# Patient Record
Sex: FEMALE | Race: OTHER | HISPANIC OR LATINO | URBAN - METROPOLITAN AREA
[De-identification: names, ages, dates, MRNs, and addresses within clinical notes are randomized per-mention and may not be internally consistent; named-entity substitution may affect disease eponyms.]

---

## 2018-02-03 ENCOUNTER — INPATIENT (INPATIENT)
Facility: HOSPITAL | Age: 83
LOS: 3 days | Discharge: HOME HEALTH | DRG: 293 | End: 2018-02-07
Attending: INTERNAL MEDICINE | Admitting: INTERNAL MEDICINE
Payer: MEDICARE

## 2018-02-03 VITALS
RESPIRATION RATE: 18 BRPM | HEART RATE: 88 BPM | DIASTOLIC BLOOD PRESSURE: 94 MMHG | SYSTOLIC BLOOD PRESSURE: 167 MMHG | OXYGEN SATURATION: 94 % | TEMPERATURE: 98 F | HEIGHT: 64 IN | WEIGHT: 182.1 LBS

## 2018-02-03 LAB
ALBUMIN SERPL ELPH-MCNC: 4.1 G/DL — SIGNIFICANT CHANGE UP (ref 3.3–5)
ALP SERPL-CCNC: 104 U/L — SIGNIFICANT CHANGE UP (ref 40–120)
ALT FLD-CCNC: 20 U/L — SIGNIFICANT CHANGE UP (ref 10–45)
ANION GAP SERPL CALC-SCNC: 12 MMOL/L — SIGNIFICANT CHANGE UP (ref 5–17)
ANION GAP SERPL CALC-SCNC: 13 MMOL/L — SIGNIFICANT CHANGE UP (ref 5–17)
APTT BLD: 36.2 SEC — SIGNIFICANT CHANGE UP (ref 27.5–37.4)
AST SERPL-CCNC: 36 U/L — SIGNIFICANT CHANGE UP (ref 10–40)
BASOPHILS NFR BLD AUTO: 0.6 % — SIGNIFICANT CHANGE UP (ref 0–2)
BILIRUB SERPL-MCNC: 0.5 MG/DL — SIGNIFICANT CHANGE UP (ref 0.2–1.2)
BLD GP AB SCN SERPL QL: NEGATIVE — SIGNIFICANT CHANGE UP
BUN SERPL-MCNC: 17 MG/DL — SIGNIFICANT CHANGE UP (ref 7–23)
BUN SERPL-MCNC: 18 MG/DL — SIGNIFICANT CHANGE UP (ref 7–23)
CALCIUM SERPL-MCNC: 9.5 MG/DL — SIGNIFICANT CHANGE UP (ref 8.4–10.5)
CALCIUM SERPL-MCNC: 9.6 MG/DL — SIGNIFICANT CHANGE UP (ref 8.4–10.5)
CHLORIDE SERPL-SCNC: 97 MMOL/L — SIGNIFICANT CHANGE UP (ref 96–108)
CHLORIDE SERPL-SCNC: 97 MMOL/L — SIGNIFICANT CHANGE UP (ref 96–108)
CHOLEST SERPL-MCNC: 134 MG/DL — SIGNIFICANT CHANGE UP (ref 10–199)
CK MB CFR SERPL CALC: 4.3 NG/ML — SIGNIFICANT CHANGE UP (ref 0–6.7)
CO2 SERPL-SCNC: 28 MMOL/L — SIGNIFICANT CHANGE UP (ref 22–31)
CO2 SERPL-SCNC: 29 MMOL/L — SIGNIFICANT CHANGE UP (ref 22–31)
CREAT SERPL-MCNC: 0.96 MG/DL — SIGNIFICANT CHANGE UP (ref 0.5–1.3)
CREAT SERPL-MCNC: 1.05 MG/DL — SIGNIFICANT CHANGE UP (ref 0.5–1.3)
EOSINOPHIL NFR BLD AUTO: 0.9 % — SIGNIFICANT CHANGE UP (ref 0–6)
GLUCOSE SERPL-MCNC: 120 MG/DL — HIGH (ref 70–99)
GLUCOSE SERPL-MCNC: 147 MG/DL — HIGH (ref 70–99)
HBA1C BLD-MCNC: 6.1 % — HIGH (ref 4–5.6)
HCT VFR BLD CALC: 41.1 % — SIGNIFICANT CHANGE UP (ref 34.5–45)
HDLC SERPL-MCNC: 42 MG/DL — SIGNIFICANT CHANGE UP (ref 40–125)
HGB BLD-MCNC: 13.4 G/DL — SIGNIFICANT CHANGE UP (ref 11.5–15.5)
INR BLD: 1.47 — HIGH (ref 0.88–1.16)
LIPID PNL WITH DIRECT LDL SERPL: 73 MG/DL — SIGNIFICANT CHANGE UP
LYMPHOCYTES # BLD AUTO: 28.7 % — SIGNIFICANT CHANGE UP (ref 13–44)
MAGNESIUM SERPL-MCNC: 2 MG/DL — SIGNIFICANT CHANGE UP (ref 1.6–2.6)
MCHC RBC-ENTMCNC: 31.4 PG — SIGNIFICANT CHANGE UP (ref 27–34)
MCHC RBC-ENTMCNC: 32.6 G/DL — SIGNIFICANT CHANGE UP (ref 32–36)
MCV RBC AUTO: 96.3 FL — SIGNIFICANT CHANGE UP (ref 80–100)
MONOCYTES NFR BLD AUTO: 13.6 % — SIGNIFICANT CHANGE UP (ref 2–14)
NEUTROPHILS NFR BLD AUTO: 56.2 % — SIGNIFICANT CHANGE UP (ref 43–77)
NT-PROBNP SERPL-SCNC: 1982 PG/ML — HIGH (ref 0–300)
PLATELET # BLD AUTO: 162 K/UL — SIGNIFICANT CHANGE UP (ref 150–400)
POTASSIUM SERPL-MCNC: 3.9 MMOL/L — SIGNIFICANT CHANGE UP (ref 3.5–5.3)
POTASSIUM SERPL-MCNC: 4.2 MMOL/L — SIGNIFICANT CHANGE UP (ref 3.5–5.3)
POTASSIUM SERPL-SCNC: 3.9 MMOL/L — SIGNIFICANT CHANGE UP (ref 3.5–5.3)
POTASSIUM SERPL-SCNC: 4.2 MMOL/L — SIGNIFICANT CHANGE UP (ref 3.5–5.3)
PROT SERPL-MCNC: 8.3 G/DL — SIGNIFICANT CHANGE UP (ref 6–8.3)
PROTHROM AB SERPL-ACNC: 16.5 SEC — HIGH (ref 9.8–12.7)
RBC # BLD: 4.27 M/UL — SIGNIFICANT CHANGE UP (ref 3.8–5.2)
RBC # FLD: 13.4 % — SIGNIFICANT CHANGE UP (ref 10.3–16.9)
RH IG SCN BLD-IMP: NEGATIVE — SIGNIFICANT CHANGE UP
SODIUM SERPL-SCNC: 137 MMOL/L — SIGNIFICANT CHANGE UP (ref 135–145)
SODIUM SERPL-SCNC: 139 MMOL/L — SIGNIFICANT CHANGE UP (ref 135–145)
TOTAL CHOLESTEROL/HDL RATIO MEASUREMENT: 3.2 RATIO — LOW (ref 3.3–7.1)
TRIGL SERPL-MCNC: 95 MG/DL — SIGNIFICANT CHANGE UP (ref 10–149)
TROPONIN T SERPL-MCNC: <0.01 NG/ML — SIGNIFICANT CHANGE UP (ref 0–0.01)
TROPONIN T SERPL-MCNC: <0.01 NG/ML — SIGNIFICANT CHANGE UP (ref 0–0.01)
TSH SERPL-MCNC: 1.18 UIU/ML — SIGNIFICANT CHANGE UP (ref 0.35–4.94)
WBC # BLD: 6.5 K/UL — SIGNIFICANT CHANGE UP (ref 3.8–10.5)
WBC # FLD AUTO: 6.5 K/UL — SIGNIFICANT CHANGE UP (ref 3.8–10.5)

## 2018-02-03 PROCEDURE — 99285 EMERGENCY DEPT VISIT HI MDM: CPT | Mod: 25

## 2018-02-03 PROCEDURE — 99223 1ST HOSP IP/OBS HIGH 75: CPT | Mod: AI

## 2018-02-03 PROCEDURE — 71045 X-RAY EXAM CHEST 1 VIEW: CPT | Mod: 26

## 2018-02-03 PROCEDURE — 93010 ELECTROCARDIOGRAM REPORT: CPT

## 2018-02-03 PROCEDURE — 70450 CT HEAD/BRAIN W/O DYE: CPT | Mod: 26

## 2018-02-03 RX ORDER — METOPROLOL TARTRATE 50 MG
12.5 TABLET ORAL ONCE
Qty: 0 | Refills: 0 | Status: COMPLETED | OUTPATIENT
Start: 2018-02-03 | End: 2018-02-03

## 2018-02-03 RX ORDER — VALSARTAN 80 MG/1
160 TABLET ORAL DAILY
Qty: 0 | Refills: 0 | Status: DISCONTINUED | OUTPATIENT
Start: 2018-02-04 | End: 2018-02-07

## 2018-02-03 RX ORDER — FUROSEMIDE 40 MG
20 TABLET ORAL
Qty: 0 | Refills: 0 | Status: DISCONTINUED | OUTPATIENT
Start: 2018-02-04 | End: 2018-02-07

## 2018-02-03 RX ORDER — FUROSEMIDE 40 MG
20 TABLET ORAL ONCE
Qty: 0 | Refills: 0 | Status: COMPLETED | OUTPATIENT
Start: 2018-02-03 | End: 2018-02-03

## 2018-02-03 RX ORDER — APIXABAN 2.5 MG/1
5 TABLET, FILM COATED ORAL EVERY 12 HOURS
Qty: 0 | Refills: 0 | Status: DISCONTINUED | OUTPATIENT
Start: 2018-02-03 | End: 2018-02-07

## 2018-02-03 RX ADMIN — APIXABAN 5 MILLIGRAM(S): 2.5 TABLET, FILM COATED ORAL at 22:05

## 2018-02-03 RX ADMIN — Medication 20 MILLIGRAM(S): at 15:54

## 2018-02-03 RX ADMIN — Medication 20 MILLIGRAM(S): at 18:36

## 2018-02-03 RX ADMIN — Medication 12.5 MILLIGRAM(S): at 22:05

## 2018-02-03 NOTE — ED PROVIDER NOTE - PROGRESS NOTE DETAILS
Pt feels a little better, she has been up and ambulatory to the bathroom 3 times however is sob afetr and noted to be satting 90-93 on RA. labs and cxr c/w mild chf exacerbation. Will continue IV lasix and admit to cards for chf exacerbation.

## 2018-02-03 NOTE — ED ADULT NURSE NOTE - OBJECTIVE STATEMENT
pt to ER w/ report of leg swelling and dizziness r/t furosemide.  Recently dx'd enlarged heart.  Pt reports some sob, denies cp/f/c/n/v.  Jonnie LE swelling noted.  IV access established, labs drawn and sent. 12 lead ekg done and shown to ER attending physician.  Pt maintained on CM. Will continue to monitor.

## 2018-02-03 NOTE — ED ADULT NURSE NOTE - PMH
Atrial fibrillation    HTN (hypertension) Atrial fibrillation    CHF (congestive heart failure)    HTN (hypertension)

## 2018-02-03 NOTE — ED PROVIDER NOTE - MEDICAL DECISION MAKING DETAILS
89F with above PMhx who p/w with likely CHF exacerbation, unknown EF, EKG shows afib rate controlled with TWI, no stemi. Will check labs, CXR, give IV lasix, reassess.

## 2018-02-03 NOTE — ED PROVIDER NOTE - PHYSICAL EXAMINATION
GEN: Well appearing, well nourished, awake, alert, oriented to person, place, time/situation and in no apparent distress.  ENT: Airway patent, Nasal mucosa clear. Mouth with normal mucosa.  EYES: Clear bilaterally.  RESPIRATORY: bibasilar rales, satting 90-93% on RA, No retractions or tachypnea however pt noted to be SOB with exertion upon ambulating o the bathroom.   CARDIAC: Irregularly irregular rate and rhythm   ABDOMEN: Soft, nontender, +bowel sounds, no rebound, rigidity, or guarding.  MSK: Range of motion is not limited, no deformities noted. +2 pitting edema to LE bilaterally.   NEURO: Alert and oriented, no focal deficits.  SKIN: Skin normal color for race, warm, dry and intact. No evidence of rash.  PSYCH: Alert and oriented to person, place, time/situation. normal mood and affect. no apparent risk to self or others.

## 2018-02-03 NOTE — ED PROVIDER NOTE - CARE PLAN
Principal Discharge DX:	CHF (congestive heart failure)  Secondary Diagnosis:	Pleural effusion, bilateral

## 2018-02-03 NOTE — PATIENT PROFILE ADULT. - NSNUTRITIONRISK_GI_A_CORE
Chest pain at rest which has resolved.  States "only hurts when I touch it"  Hx of HTN, MS, ?MI in the past (no intervention or diagnostic workup).  Smoker, BP elevated on arrival, states "it is always high".  takes asa daily at night.  Lungs clear, no loud murmurs.  EKG with frequent PVCs.  CXR, labs, monitor ordered No indicators present

## 2018-02-03 NOTE — ED PROVIDER NOTE - OBJECTIVE STATEMENT
89F with a h/o afib on eliquis, CHF, HTN, recent admit to OSH in [place] for rapid afib, now controlled with PO diltiazem, who p/w worsening SOb, worsening LE edema and occasionally dizziness with standing since discharge from the hospital several week ago. No CP, no SOb at rest, No f/c, no trauma or fall. Pt has been taking her medication including 20mg lasix daily.

## 2018-02-03 NOTE — ED ADULT TRIAGE NOTE - CHIEF COMPLAINT QUOTE
" Leg swelling for 3 weeks received furosemide for it but it doesn't work just makes her dizzy" As per daughter  hx Afib

## 2018-02-04 DIAGNOSIS — I48.91 UNSPECIFIED ATRIAL FIBRILLATION: ICD-10-CM

## 2018-02-04 DIAGNOSIS — I10 ESSENTIAL (PRIMARY) HYPERTENSION: ICD-10-CM

## 2018-02-04 DIAGNOSIS — I50.9 HEART FAILURE, UNSPECIFIED: ICD-10-CM

## 2018-02-04 DIAGNOSIS — R63.8 OTHER SYMPTOMS AND SIGNS CONCERNING FOOD AND FLUID INTAKE: ICD-10-CM

## 2018-02-04 DIAGNOSIS — Z29.9 ENCOUNTER FOR PROPHYLACTIC MEASURES, UNSPECIFIED: ICD-10-CM

## 2018-02-04 LAB
ANION GAP SERPL CALC-SCNC: 14 MMOL/L — SIGNIFICANT CHANGE UP (ref 5–17)
BLD GP AB SCN SERPL QL: NEGATIVE — SIGNIFICANT CHANGE UP
BUN SERPL-MCNC: 20 MG/DL — SIGNIFICANT CHANGE UP (ref 7–23)
CALCIUM SERPL-MCNC: 9.3 MG/DL — SIGNIFICANT CHANGE UP (ref 8.4–10.5)
CHLORIDE SERPL-SCNC: 98 MMOL/L — SIGNIFICANT CHANGE UP (ref 96–108)
CO2 SERPL-SCNC: 28 MMOL/L — SIGNIFICANT CHANGE UP (ref 22–31)
CREAT SERPL-MCNC: 0.81 MG/DL — SIGNIFICANT CHANGE UP (ref 0.5–1.3)
GLUCOSE SERPL-MCNC: 105 MG/DL — HIGH (ref 70–99)
HCT VFR BLD CALC: 39.7 % — SIGNIFICANT CHANGE UP (ref 34.5–45)
HGB BLD-MCNC: 13 G/DL — SIGNIFICANT CHANGE UP (ref 11.5–15.5)
LYMPHOCYTES # BLD AUTO: 26 % — SIGNIFICANT CHANGE UP (ref 13–44)
MAGNESIUM SERPL-MCNC: 2 MG/DL — SIGNIFICANT CHANGE UP (ref 1.6–2.6)
MCHC RBC-ENTMCNC: 31.3 PG — SIGNIFICANT CHANGE UP (ref 27–34)
MCHC RBC-ENTMCNC: 32.7 G/DL — SIGNIFICANT CHANGE UP (ref 32–36)
MCV RBC AUTO: 95.4 FL — SIGNIFICANT CHANGE UP (ref 80–100)
MONOCYTES NFR BLD AUTO: 11 % — SIGNIFICANT CHANGE UP (ref 2–14)
NEUTROPHILS NFR BLD AUTO: 45 % — SIGNIFICANT CHANGE UP (ref 43–77)
PLATELET # BLD AUTO: 161 K/UL — SIGNIFICANT CHANGE UP (ref 150–400)
POTASSIUM SERPL-MCNC: 3.3 MMOL/L — LOW (ref 3.5–5.3)
POTASSIUM SERPL-SCNC: 3.3 MMOL/L — LOW (ref 3.5–5.3)
RBC # BLD: 4.16 M/UL — SIGNIFICANT CHANGE UP (ref 3.8–5.2)
RBC # FLD: 13.5 % — SIGNIFICANT CHANGE UP (ref 10.3–16.9)
RH IG SCN BLD-IMP: NEGATIVE — SIGNIFICANT CHANGE UP
SODIUM SERPL-SCNC: 140 MMOL/L — SIGNIFICANT CHANGE UP (ref 135–145)
WBC # BLD: 6.4 K/UL — SIGNIFICANT CHANGE UP (ref 3.8–10.5)
WBC # FLD AUTO: 6.4 K/UL — SIGNIFICANT CHANGE UP (ref 3.8–10.5)

## 2018-02-04 PROCEDURE — 93010 ELECTROCARDIOGRAM REPORT: CPT

## 2018-02-04 PROCEDURE — 99291 CRITICAL CARE FIRST HOUR: CPT

## 2018-02-04 PROCEDURE — 71045 X-RAY EXAM CHEST 1 VIEW: CPT | Mod: 26

## 2018-02-04 RX ORDER — CARVEDILOL PHOSPHATE 80 MG/1
3.12 CAPSULE, EXTENDED RELEASE ORAL EVERY 12 HOURS
Qty: 0 | Refills: 0 | Status: DISCONTINUED | OUTPATIENT
Start: 2018-02-04 | End: 2018-02-05

## 2018-02-04 RX ORDER — POTASSIUM CHLORIDE 20 MEQ
40 PACKET (EA) ORAL EVERY 4 HOURS
Qty: 0 | Refills: 0 | Status: COMPLETED | OUTPATIENT
Start: 2018-02-04 | End: 2018-02-04

## 2018-02-04 RX ADMIN — APIXABAN 5 MILLIGRAM(S): 2.5 TABLET, FILM COATED ORAL at 05:45

## 2018-02-04 RX ADMIN — Medication 20 MILLIGRAM(S): at 05:46

## 2018-02-04 RX ADMIN — APIXABAN 5 MILLIGRAM(S): 2.5 TABLET, FILM COATED ORAL at 18:00

## 2018-02-04 RX ADMIN — CARVEDILOL PHOSPHATE 3.12 MILLIGRAM(S): 80 CAPSULE, EXTENDED RELEASE ORAL at 12:19

## 2018-02-04 RX ADMIN — VALSARTAN 160 MILLIGRAM(S): 80 TABLET ORAL at 05:46

## 2018-02-04 RX ADMIN — Medication 40 MILLIEQUIVALENT(S): at 14:37

## 2018-02-04 RX ADMIN — Medication 20 MILLIGRAM(S): at 18:00

## 2018-02-04 RX ADMIN — Medication 40 MILLIEQUIVALENT(S): at 10:14

## 2018-02-04 NOTE — PHYSICAL THERAPY INITIAL EVALUATION ADULT - ADDITIONAL COMMENTS
Patient reports independence with all ADLs/IADLs prior to admission. No HHA. Patient/patient's daughters all deny history of mechanical falls. Does not wear home O2. Grocery store beneath patient's apartment for daily needs, daughters assist with bigger items/large shopping trips.

## 2018-02-04 NOTE — PHYSICAL THERAPY INITIAL EVALUATION ADULT - GAIT DEVIATIONS NOTED, PT EVAL
fairly steady gait, no LOB noted, no complaints of dizziness/SOB (ambulated on room air, SpO2=85-90% however unsure of accuracy of pulse-ox as numbers varied by the second, MD Hassan made aware)/decreased john/decreased step length

## 2018-02-04 NOTE — H&P ADULT - PROBLEM SELECTOR PLAN 3
Patient normotensive took home valsartan in the AM will dose home dose at 2/4 6:00 AM  c/w home valsartan 160 daily  holding home atenolol

## 2018-02-04 NOTE — H&P ADULT - PROBLEM SELECTOR PLAN 1
Acute on chronic exacerbation of CHF, patient with JVD to the mandible, crackles at bases.  Chest x-ray congested.  Patient with 1+ edema of the lower extremities.  - Given 20 of IV Lasix x 2 in the ED  - Monitor UOP and dose Lasix based on fluid status  - Patient normotensive patient took home valsartan in the AM, will dose for home dose at 2/4 6:00 AM

## 2018-02-04 NOTE — H&P ADULT - ASSESSMENT
Patient is a 89 year old woman with history of afib on eliquis, CHF unknown EF, HTN recently admitted to hospital in [place] for pneumonia found to be in rapid afib 3 months ago now controlled with PO diltiazem, who presents for worsening SOB and worsening LE edema.

## 2018-02-04 NOTE — PROGRESS NOTE ADULT - SUBJECTIVE AND OBJECTIVE BOX
INTERVAL EVENTS: did not tolerate BIPAP well as she was very uncomfortable with it on    SUBJECTIVE: Pt states her SOB has improved and she no longer feels SOB at rest or while walking. She denies orthopnea, chest pain, fever, chills, cough.       REVIEW OF SYSTEMS:  Constitutional: no malaise/fatigue/fevers  HEENT: no headache/sore throat/rhinorrhea  Respiratory: no cough/SOB/chest pain  Cardiac: no palpitations/chest pain  Vascular: no leg swelling  Gastrointestinal: no nausea/vomiting/diarrhea/constipation  Genitourinary: no dysuria/nocturia/polyuria  MSK: no joint or muscle pain  Skin: no rashes  Neuro: no headaches/focal weakness/numbness  Psych: no depression or anxiety    Vital Signs Last 24 Hrs  T(C): 36.8 (04 Feb 2018 04:56), Max: 36.8 (04 Feb 2018 04:56)  T(F): 98.3 (04 Feb 2018 04:56), Max: 98.3 (04 Feb 2018 04:56)  HR: 72 (04 Feb 2018 08:25) (54 - 88)  BP: 144/52 (04 Feb 2018 08:25) (124/46 - 167/94)  BP(mean): 87 (04 Feb 2018 08:25) (69 - 98)  RR: 16 (04 Feb 2018 08:25) (13 - 18)  SpO2: 93% (04 Feb 2018 08:25) (91% - 97%)    Height (cm): 162.56 (02-03 @ 19:31)  Weight (kg): 86.2 (02-03 @ 19:31)  BMI (kg/m2): 32.6 (02-03 @ 19:31)    CAPILLARY BLOOD GLUCOSE          PHYSICAL EXAM:  Gen:       Well-appearing, NAD  Skin:       Warm, dry, no rash  HEENT:  MMM, oropharynx clear, nares clear, no septal deviation  Neck:     supple, + JVD  Cardiac:  RRR, S1/S2 present, 3/6 systolic murmur at LLSB   Pulm:     bilateral crackles R>L with decreased breath sounds at bases  Abd:       S/NT/ND, normoactive BS  Lymph:  no anterior/posterior/supraclavicular nodes appreciated  Vasc:      WWP, 2+ DP and radial pulses, 3+ pitting edema of LE b/l   Ext:         Normal ROM, atraumatic  Neuro:   AAOx3, CN II-XII grossly intact, no focal deficits    MEDICATIONS  (STANDING):  apixaban 5 milliGRAM(s) Oral every 12 hours  furosemide   Injectable 20 milliGRAM(s) IV Push two times a day  valsartan 160 milliGRAM(s) Oral daily    MEDICATIONS  (PRN):      Allergies    penicillin (Rash)    Intolerances      LABS:                        13.0   6.4   )-----------( 161      ( 04 Feb 2018 07:37 )             39.7    Mean Cell Volume: 95.4 fL (02-04 @ 07:37)    02-04    140  |  98  |  20  ----------------------------<  105<H>  3.3<L>   |  28  |  0.81    Ca    9.3      04 Feb 2018 07:37  Mg     2.0     02-04    TPro  8.3  /  Alb  4.1  /  TBili  0.5  /  DBili  x   /  AST  36  /  ALT  20  /  AlkPhos  104  02-03    PT/INR - ( 03 Feb 2018 15:02 )   PT: 16.5 sec;   INR: 1.47          PTT - ( 03 Feb 2018 15:02 )  PTT:36.2 sec      MICROBIOLOGY:      RADIOLOGY:

## 2018-02-04 NOTE — PROGRESS NOTE ADULT - ASSESSMENT
89yo F w/ PMHx of afib on eliquis, CHF unknown EF, HTN recently admitted to hospital in [place] for pneumonia found to be in rapid afib 3 months ago now controlled with PO diltiazem, who presents for worsening SOB and LE edema, admitted for acute CHF exacerbation.

## 2018-02-04 NOTE — H&P ADULT - PROBLEM SELECTOR PLAN 2
- Patients [id number]= 5, c/w home anticoagulation with Eliquis 5mg q 12  - rate controlled at home with diltiazem, as per daughter was on metoprolol however patient became altered on this medication.  Given acute hear failure exacerbation holding home diltiazem and dosed metoprolol 12.5mg BID.  Will continue to monitor heart rate. - Patients [id number]= 5, c/w home anticoagulation with Eliquis 5mg q 12  - rate controlled at home with diltiazem, as per daughter was on metoprolol however patient became altered on this medication.  Given acute heart failure exacerbation holding home diltiazem and dosed metoprolol 12.5mg x 1.  Will continue to monitor heart rate. Will address restarting atenolol in the AM.

## 2018-02-04 NOTE — H&P ADULT - NSHPREVIEWOFSYSTEMS_GEN_ALL_CORE
REVIEW OF SYSTEMS:  CONSTITUTIONAL: Generalized weakness for the past few month, negative fevers or chills  EYES/ENT: Patient denies visual changes; throat pain, rhinorrhea, or ear pain   NECK: Patient denies pain or stiffness  RESPIRATORY: Patient denies cough, wheezing, denies shortness of breath  CARDIOVASCULAR: Patient denies chest pain or palpitations  GASTROINTESTINAL: Patient denies abdominal or epigastric pain, nausea, vomiting, diarrhea or constipation  GENITOURINARY: Patient denies dysuria, frequency or hematuria  NEUROLOGICAL: Patient denies numbness or weakness  SKIN: Patient denies itching, burning, rashes, or lesions   All other review of systems is negative unless indicated above.

## 2018-02-04 NOTE — H&P ADULT - NSHPLABSRESULTS_GEN_ALL_CORE
LABS:                         13.4   6.5   )-----------( 162      ( 03 Feb 2018 15:02 )             41.1     02-03    139  |  97  |  18  ----------------------------<  147<H>  3.9   |  29  |  1.05    Ca    9.6      03 Feb 2018 20:41  Mg     2.0     02-03    TPro  8.3  /  Alb  4.1  /  TBili  0.5  /  DBili  x   /  AST  36  /  ALT  20  /  AlkPhos  104  02-03    PT/INR - ( 03 Feb 2018 15:02 )   PT: 16.5 sec;   INR: 1.47          PTT - ( 03 Feb 2018 15:02 )  PTT:36.2 sec    CARDIAC MARKERS ( 03 Feb 2018 20:41 )  x     / <0.01 ng/mL / x     / x     / x      CARDIAC MARKERS ( 03 Feb 2018 15:02 )  x     / <0.01 ng/mL / 108 U/L / x     / 4.3 ng/mL      Serum Pro-Brain Natriuretic Peptide: 1982 pg/mL (02-03 @ 15:02)        RADIOLOGY, EKG & ADDITIONAL TESTS: Reviewed.

## 2018-02-04 NOTE — PHYSICAL THERAPY INITIAL EVALUATION ADULT - PERTINENT HX OF CURRENT PROBLEM, REHAB EVAL
Patient is a 89 year old woman with history of afib on eliquis, CHF unknown EF, HTN recently admitted to hospital in [place] for pneumonia found to be in rapid afib 3 months ago now controlled with PO diltiazem, who presents for worsening SOB, worsening LE edema. Patient states since her admission 3 months ago she has been SOB with exertion and has had worsening lower extremity edema. Please refer to H&P on [place] for remaining.

## 2018-02-04 NOTE — H&P ADULT - NSHPPHYSICALEXAM_GEN_ALL_CORE
VITAL SIGNS:  T(F): 97.9 (02-03-18 @ 20:30), Max: 98.1 (02-03-18 @ 14:18)  HR: 54 (02-03-18 @ 23:36) (54 - 88)  BP: 132/53 (02-03-18 @ 23:32) (132/53 - 167/94)  BP(mean): 76 (02-03-18 @ 23:32) (76 - 98)  RR: 13 (02-03-18 @ 23:36) (13 - 18)  SpO2: 96% (02-03-18 @ 23:36) (91% - 97%)    PHYSICAL EXAM:    Constitutional: WDWN resting comfortably in bed; NAD  HEENT: NC/AT, PERRL, no nasal discharge; no oropharyngeal erythema or exudates; MMM  Neck: supple; no JVD or thyromegaly  Respiratory: CTA B/L; no W/R/R, no retractions  Cardiac: +S1/S2; RRR; no M/R/G; PMI non-displaced  Gastrointestinal: soft, NT/ND; no rebound or guarding; +BSx4  Genitourinary: normal external genitalia  Back: spine midline, no bony tenderness or step-offs; no CVAT B/L  Extremities: WWP, no clubbing or cyanosis; no peripheral edema  Musculoskeletal: NROM x4; no joint swelling, tenderness or erythema  Vascular: 2+ radial, femoral, DP/PT pulses B/L  Dermatologic: skin warm, dry and intact; no rashes, wounds, or scars  Lymphatic: no submandibular or cervical LAD  Neurologic: AAOx3; CNII-XII grossly intact; no focal deficits  Psychiatric: affect and characteristics of appearance, verbalizations, behaviors are appropriate, denies SI/HI/AH/VH VITAL SIGNS:  T(F): 97.9 (02-03-18 @ 20:30), Max: 98.1 (02-03-18 @ 14:18)  HR: 54 (02-03-18 @ 23:36) (54 - 88)  BP: 132/53 (02-03-18 @ 23:32) (132/53 - 167/94)  BP(mean): 76 (02-03-18 @ 23:32) (76 - 98)  RR: 13 (02-03-18 @ 23:36) (13 - 18)  SpO2: 96% (02-03-18 @ 23:36) (91% - 97%)    PHYSICAL EXAM:    Constitutional: WDWN resting comfortably in bed; NAD  HEENT: NC/AT, PERRL, no nasal discharge; no oropharyngeal erythema or exudates; MMM  Neck: supple; + JVD to mandible   Respiratory: Bibasilar fine crackles, decreased breath sounds bilaterally  Cardiac: Grade III systolic ejection murmur best heard right sternal border second intercostal space, non-radiating, no rubs or gallops  Gastrointestinal: soft, NT/ND; no rebound or guarding; +BSx4  Extremities: WWP, 1+ peripheral edema to the knee  Vascular: 2+ pulses UE and LE  Dermatologic: skin warm, dry and intact; no rashes, wounds, or scars  Neurologic: AAOx3; CNII-XII grossly intact; no focal deficits  Psychiatric: affect and characteristics of appearance, verbalizations, behaviors are appropriate

## 2018-02-04 NOTE — PHYSICAL THERAPY INITIAL EVALUATION ADULT - GENERAL OBSERVATIONS, REHAB EVAL
Chart reviewed. IE Completed. Patient without complaints of pain at rest, agreeable to PT. Patient received seated EOB, NAD, +tele, +IV hep lock, 2LO2NC ffed, daughters at bedside, ASHER Block cleared patient for treatment.

## 2018-02-04 NOTE — PROGRESS NOTE ADULT - PROBLEM SELECTOR PLAN 1
Pt admitted for acute on chronic exacerbation of CHF possibly 2/2 medication non-compliance or diet (pt lives alone). This AM she continues to appear overloaded on exam with 2-3+ pitting edema LE bilaterally and crackles auscultated on lung exam   - c/w diuresis with lasix 20mg IV BID, net neg 1.5L overnight  - requiring 2L NC to maintain O2 >90. will attempt to wean off O2 as congestion improves (pt not on home O2)  - encourage use of IS to prevent atelectasis   - Monitor UOP with strict I/Os  - will hold home cardizem in setting of CHF exacerbation  - obtain ECHO tomorrow Pt admitted for acute on chronic exacerbation of CHF possibly 2/2 medication non-compliance or diet (pt lives alone). This AM she continues to appear overloaded on exam with 2-3+ pitting edema LE bilaterally and crackles auscultated on lung exam   - c/w diuresis with lasix 20mg IV BID, net neg 1.5L overnight  - requiring 2L NC at rest to maintain O2 >90. and desat'ed to 85% while ambulating with PT; will attempt to wean off O2 as congestion improves (pt not on home O2)  - encourage use of IS to prevent atelectasis   - Monitor UOP with strict I/Os  - will hold home cardizem in setting of CHF exacerbation  - f/u echo Pt admitted for acute on chronic exacerbation of CHF possibly 2/2 medications (not optimized on current home regimen). Pt denies diet heavy in salt.   This AM she continues to appear overloaded on exam with 2-3+ pitting edema LE bilaterally and crackles auscultated on lung exam   - c/w diuresis with lasix 20mg IV BID, net neg 1.5L overnight  - requiring 2L NC at rest to maintain O2 >90. and desat'ed to 85% while ambulating with PT; will attempt to wean off O2 as congestion improves (pt not on home O2)  - encourage use of IS to prevent atelectasis   - Monitor UOP with strict I/Os  - will hold home cardizem in setting of CHF exacerbation  - started coreg 3.125mg BID as BP and HR tolerate for medical optimization  - c/w home valsartan 160mg qday, will attempt to uptitrate as tolerates  - f/u echo

## 2018-02-04 NOTE — PROGRESS NOTE ADULT - PROBLEM SELECTOR PLAN 2
- Patients [id number]= 5, c/w home anticoagulation with Eliquis 5mg q 12  - rate controlled at home with diltiazem, as per daughter was on metoprolol however patient became altered on this medication.  Given acute heart failure exacerbation holding home diltiazem and dosed metoprolol 12.5mg x 1.   - HR in 60s, will continue to hold home atenolol and cardizem; if HR uptrend, will consider restarting cardizem

## 2018-02-04 NOTE — PHYSICAL THERAPY INITIAL EVALUATION ADULT - EDEMA 1+ (TRACE)
left foot/right leg/left leg/left knee/right knee/left ankle/+pitting edema noted (RN Mckayla aware, as per RN edema has improved since yesterday, 2/3/18)/right ankle/right foot

## 2018-02-04 NOTE — PROGRESS NOTE ADULT - PROBLEM SELECTOR PLAN 3
- c/w home valsartan 160 daily  - holding home atenolol - c/w home valsartan 160 daily  - holding home atenolol    # ASCVD 37% if adjust age for 79 years  - consider starting lipitor 40mg qday given ASCVD risk of 37% if age adjusted to 79  - no h/o CAD

## 2018-02-04 NOTE — H&P ADULT - HISTORY OF PRESENT ILLNESS
Patient is a 89 year old woman with history of afib on eliquis, CHF unknown EF, HTN recently admitted to hospital in [place] for pneumonia found to be in rapid afib 3 months ago now controlled with PO diltiazem, who presents for worsening SOB, worsening LE edema. Patient states since her admission 3 months ago she has been SOB with exertion and has had worsening lower extremity edema.  States she sees a cardiologist in [place] however as per daughter does not always go as frequently as she should.  Patient takes lasix 20, valsartan 160, atenolol 50, and ditalizem 180 for her cardiac issues. Patient denies chest pain, SOB at rest, cough, fevers, chills.  Patient states she sleeps with 2 pillows at night however is able to lay flat.  Patient states she is SOB walking in her apartment room to room. Patient is a 89 year old woman with history of afib on eliquis, CHF unknown EF, HTN recently admitted to hospital in [place] for pneumonia found to be in rapid afib 3 months ago now controlled with PO diltiazem, who presents for worsening SOB, worsening LE edema. Patient states since her admission 3 months ago she has been SOB with exertion and has had worsening lower extremity edema.  States she sees a cardiologist in [place] however as per daughter does not always go as frequently as she should.  Patient takes lasix 20, valsartan 160, atenolol 50, and ditalizem 180 for her cardiac issues. Patient denies chest pain, SOB at rest, cough, fevers, chills.  Patient states she sleeps with 2 pillows at night however is able to lay flat.  Patient states she is SOB walking in her apartment room to room.  ED Course: In the ED patient given 20 IV Lasix. Patient is a 89 year old woman with history of afib on eliquis, CHF unknown EF, HTN recently admitted to hospital in [place] for pneumonia found to be in rapid afib 3 months ago now controlled with PO diltiazem, who presents for worsening SOB, worsening LE edema. Patient states since her admission 3 months ago she has been SOB with exertion and has had worsening lower extremity edema.  States she sees a cardiologist in [place] however as per daughter does not always go as frequently as she should.  Patient takes lasix 20, valsartan 160, atenolol 50, and ditalizem 180 for her cardiac issues. Patient denies chest pain, SOB at rest, cough, fevers, chills.  Patient states she sleeps with 2 pillows at night however is able to lay flat.  Patient states she is SOB walking in her apartment room to room.  ED Course: In the ED patient T 98.1, HR 88 /94 R 18 S 94. Patient given 20 IV Lasix x 2. EKG showed t-wave inversions in I, AVL, V5 and V6. Trops negative.  Chest x-ray showed bilateral effusions. CT head because of dizziness, showed no acute intracranial hemorrhage or acute transcortical infarct.  Showed chronic bilateral lacunar infarcts.

## 2018-02-05 ENCOUNTER — TRANSCRIPTION ENCOUNTER (OUTPATIENT)
Age: 83
End: 2018-02-05

## 2018-02-05 LAB
ANION GAP SERPL CALC-SCNC: 13 MMOL/L — SIGNIFICANT CHANGE UP (ref 5–17)
BASOPHILS NFR BLD AUTO: 0.7 % — SIGNIFICANT CHANGE UP (ref 0–2)
BUN SERPL-MCNC: 20 MG/DL — SIGNIFICANT CHANGE UP (ref 7–23)
CALCIUM SERPL-MCNC: 9.2 MG/DL — SIGNIFICANT CHANGE UP (ref 8.4–10.5)
CHLORIDE SERPL-SCNC: 96 MMOL/L — SIGNIFICANT CHANGE UP (ref 96–108)
CO2 SERPL-SCNC: 27 MMOL/L — SIGNIFICANT CHANGE UP (ref 22–31)
CREAT SERPL-MCNC: 0.75 MG/DL — SIGNIFICANT CHANGE UP (ref 0.5–1.3)
EOSINOPHIL NFR BLD AUTO: 4.2 % — SIGNIFICANT CHANGE UP (ref 0–6)
GLUCOSE SERPL-MCNC: 102 MG/DL — HIGH (ref 70–99)
HCT VFR BLD CALC: 42 % — SIGNIFICANT CHANGE UP (ref 34.5–45)
HGB BLD-MCNC: 13.7 G/DL — SIGNIFICANT CHANGE UP (ref 11.5–15.5)
LYMPHOCYTES # BLD AUTO: 37.4 % — SIGNIFICANT CHANGE UP (ref 13–44)
MAGNESIUM SERPL-MCNC: 1.9 MG/DL — SIGNIFICANT CHANGE UP (ref 1.6–2.6)
MCHC RBC-ENTMCNC: 31.1 PG — SIGNIFICANT CHANGE UP (ref 27–34)
MCHC RBC-ENTMCNC: 32.6 G/DL — SIGNIFICANT CHANGE UP (ref 32–36)
MCV RBC AUTO: 95.5 FL — SIGNIFICANT CHANGE UP (ref 80–100)
MONOCYTES NFR BLD AUTO: 11.8 % — SIGNIFICANT CHANGE UP (ref 2–14)
NEUTROPHILS NFR BLD AUTO: 45.9 % — SIGNIFICANT CHANGE UP (ref 43–77)
PLATELET # BLD AUTO: 173 K/UL — SIGNIFICANT CHANGE UP (ref 150–400)
POTASSIUM SERPL-MCNC: 3.9 MMOL/L — SIGNIFICANT CHANGE UP (ref 3.5–5.3)
POTASSIUM SERPL-SCNC: 3.9 MMOL/L — SIGNIFICANT CHANGE UP (ref 3.5–5.3)
RBC # BLD: 4.4 M/UL — SIGNIFICANT CHANGE UP (ref 3.8–5.2)
RBC # FLD: 13.5 % — SIGNIFICANT CHANGE UP (ref 10.3–16.9)
SODIUM SERPL-SCNC: 136 MMOL/L — SIGNIFICANT CHANGE UP (ref 135–145)
WBC # BLD: 7.4 K/UL — SIGNIFICANT CHANGE UP (ref 3.8–10.5)
WBC # FLD AUTO: 7.4 K/UL — SIGNIFICANT CHANGE UP (ref 3.8–10.5)

## 2018-02-05 PROCEDURE — 71045 X-RAY EXAM CHEST 1 VIEW: CPT | Mod: 26

## 2018-02-05 PROCEDURE — 99233 SBSQ HOSP IP/OBS HIGH 50: CPT

## 2018-02-05 PROCEDURE — 93306 TTE W/DOPPLER COMPLETE: CPT | Mod: 26

## 2018-02-05 PROCEDURE — 93010 ELECTROCARDIOGRAM REPORT: CPT

## 2018-02-05 RX ORDER — CARVEDILOL PHOSPHATE 80 MG/1
3.12 CAPSULE, EXTENDED RELEASE ORAL ONCE
Qty: 0 | Refills: 0 | Status: DISCONTINUED | OUTPATIENT
Start: 2018-02-05 | End: 2018-02-05

## 2018-02-05 RX ORDER — POTASSIUM CHLORIDE 20 MEQ
20 PACKET (EA) ORAL ONCE
Qty: 0 | Refills: 0 | Status: COMPLETED | OUTPATIENT
Start: 2018-02-05 | End: 2018-02-05

## 2018-02-05 RX ORDER — CARVEDILOL PHOSPHATE 80 MG/1
3.12 CAPSULE, EXTENDED RELEASE ORAL EVERY 12 HOURS
Qty: 0 | Refills: 0 | Status: DISCONTINUED | OUTPATIENT
Start: 2018-02-05 | End: 2018-02-05

## 2018-02-05 RX ORDER — CARVEDILOL PHOSPHATE 80 MG/1
6.25 CAPSULE, EXTENDED RELEASE ORAL EVERY 12 HOURS
Qty: 0 | Refills: 0 | Status: DISCONTINUED | OUTPATIENT
Start: 2018-02-05 | End: 2018-02-05

## 2018-02-05 RX ORDER — FUROSEMIDE 40 MG
20 TABLET ORAL ONCE
Qty: 0 | Refills: 0 | Status: COMPLETED | OUTPATIENT
Start: 2018-02-05 | End: 2018-02-05

## 2018-02-05 RX ORDER — ATORVASTATIN CALCIUM 80 MG/1
10 TABLET, FILM COATED ORAL AT BEDTIME
Qty: 0 | Refills: 0 | Status: DISCONTINUED | OUTPATIENT
Start: 2018-02-05 | End: 2018-02-07

## 2018-02-05 RX ADMIN — Medication 20 MILLIGRAM(S): at 18:46

## 2018-02-05 RX ADMIN — Medication 20 MILLIEQUIVALENT(S): at 09:14

## 2018-02-05 RX ADMIN — APIXABAN 5 MILLIGRAM(S): 2.5 TABLET, FILM COATED ORAL at 06:09

## 2018-02-05 RX ADMIN — Medication 20 MILLIGRAM(S): at 09:15

## 2018-02-05 RX ADMIN — Medication 20 MILLIGRAM(S): at 06:09

## 2018-02-05 RX ADMIN — CARVEDILOL PHOSPHATE 3.12 MILLIGRAM(S): 80 CAPSULE, EXTENDED RELEASE ORAL at 06:09

## 2018-02-05 RX ADMIN — APIXABAN 5 MILLIGRAM(S): 2.5 TABLET, FILM COATED ORAL at 18:46

## 2018-02-05 RX ADMIN — VALSARTAN 160 MILLIGRAM(S): 80 TABLET ORAL at 06:09

## 2018-02-05 RX ADMIN — ATORVASTATIN CALCIUM 10 MILLIGRAM(S): 80 TABLET, FILM COATED ORAL at 21:34

## 2018-02-05 NOTE — DISCHARGE NOTE ADULT - MEDICATION SUMMARY - MEDICATIONS TO STOP TAKING
I will STOP taking the medications listed below when I get home from the hospital:    atenolol  -- 50  by mouth once a day

## 2018-02-05 NOTE — DISCHARGE NOTE ADULT - HOSPITAL COURSE
89 year old female with PMH  afib on eliquis, CHF, HTN recently admitted to hospital [place] for pneumonia found to be in rapid afib 3 months ago now controlled with PO diltiazem, who presents for worsening SOB, worsening LE edema. Patient states since her admission 3 months ago she has been SOB with exertion and has had worsening lower extremity edema.Per patient gets SOB walking in apartment room to room. She [place] see a cardiologist in [place], however, does not follow up as should. Denies CP, SOB at rest, cough, fevers, chills.  Patient states she sleeps with 2 pillows at night however is able to lay flat.   In the ED patient T 98.1, HR 88 /94 R 18 S 94. Patient given 20 IV Lasix x 2. EKG showed t-wave inversions in I, AVL, V5 and V6. Trops negative.  Chest x-ray showed bilateral effusions. CT head because of dizziness, showed no acute intracranial hemorrhage or acute transcortical infarct.  Showed chronic bilateral lacunar infarcts.  During hospital course patient was diuresed with Lasix 20 mg IV BID with good response as net neg [id number] since admission and was able to be weaned off oxygen and lay flat on RA and is now transitioned to PO Lasix.  ECHO was performed during this admission which showed EF 50-55%, mod dilated La, severe dilated RA, small 5mm mobile echodensity to aortic side of L coronary cusp, Differential includes Lambl's excresence Vs small fibroelastoma - NTD as size <1cm. Patient already on AC Eliquis so no need for further AC according to EP. Course c/b by HARI on exertion/ambulation HR 120s - 130s for which EP Dr. Hardwick was consulted.. Coreg was discontinued and patient was started on Diltiazem 60 mg q6H which was then transitioned to Diltiazem CD BID. Patient medically optimized and hemodynamically stable for discharge 89 year old female with PMH  afib on eliquis, CHF, HTN recently admitted [place] 3mos ago for pneumonia found to be in rapid afib, now controlled with PO diltiazem, who presents for worsening SOB, worsening LE edema. Patient states since her admission 3 months ago she has been SOB with exertion and has had worsening lower extremity edema. Per patient gets SOB walking in apartment room to room. She does [place] a [place]ologist [place], however, does not follow up as should. Denies CP, SOB at rest, cough, fevers, chills.  Patient states she sleeps with 2 pillows at night however is able to lay flat. In the ED patient T 98.1, HR 88 /94 R 18 S 94. Patient given 20 IV Lasix x 2. EKG showed t-wave inversions in I, AVL, V5 and V6. Trops negative.  Chest x-ray showed bilateral effusions. CT head because of dizziness, showed no acute intracranial hemorrhage or acute transcortical infarct.  Showed chronic bilateral lacunar infarcts.  She was admitted to [facility] for acute on chronic diastolic CHF exacerbation. During hospital course patient was diuresed with Lasix 20 mg IV BID with good response as net neg 5.1L since admission and was able to be weaned off oxygen, lay flat on RA and is now transitioned to PO Lasix.  ECHO was performed during this admission which showed EF 50-55%, mod dilated La, severe dilated RA, small 5mm mobile echodensity to aortic side of L coronary cusp, Differential includes Lambl's excresence Vs small fibroelastoma - NTD as size <1cm. Patient already on AC Eliquis so no need for further AC according to EP. Course c/b by HARI on exertion/ambulation HR 120s - 130s for which EP Dr. Hardwick was consulted.. Coreg was discontinued and patient was started on Diltiazem 60 mg q6H which was then transitioned to Diltiazem CD BID. Patient medically optimized and hemodynamically stable for discharge 89 year old female with PMH  afib on eliquis, CHF, HTN recently admitted [place] 3mos ago for pneumonia found to be in rapid afib, now controlled with PO diltiazem, who presents for worsening SOB, worsening LE edema. Patient states since her admission 3 months ago she has been SOB with exertion and has had worsening lower extremity edema. Per patient gets SOB walking in apartment room to room. She [place] a [place]ologist [place], however, does not follow up as should. Denies CP, SOB at rest, cough, fevers, chills.  Patient states she sleeps with 2 pillows at night however is able to lay flat. In the ED patient T 98.1, HR 88 /94 R 18 S 94. Patient given 20 IV Lasix x 2. EKG showed t-wave inversions in I, AVL, V5 and V6. Trops negative.  Chest x-ray showed bilateral effusions. CT head because of dizziness, showed no acute intracranial hemorrhage or acute transcortical infarct.  Showed chronic bilateral lacunar infarcts.  She was admitted to [facility] for acute on chronic diastolic CHF exacerbation. During hospital course patient was diuresed with Lasix 20 mg IV BID with good response as net neg 5.7L since admission and was able to be weaned off oxygen, lay flat on RA and is now transitioned to PO Lasix.  ECHO was performed during this admission which showed EF 50-55%, mod dilated LA, severe dilated RA, small 5mm mobile echodensity to aortic side of L coronary cusp, Differential includes Lambl's excresence Vs small fibroelastoma - NTD as size <1cm. Patient already on AC Eliquis so no need for further AC according to EP. Course c/b by HARI on exertion/ambulation HR 120s - 130s for which EP Dr. Hardwick was consulted.. Coreg was discontinued and patient was started on Diltiazem 60 mg q6H which was then transitioned to Diltiazem CD 120mg BID. Patient medically optimized and hemodynamically stable for discharge to home w/ [facility] services with outpatient cardiology and EP follow up appointments made.

## 2018-02-05 NOTE — PROGRESS NOTE ADULT - SUBJECTIVE AND OBJECTIVE BOX
CARDIOLOGY NP PROGRESS NOTE    Subjective:   Patient seen and examined at bedside. Patient states feeling well without SOB. Denies CP, palpitations and dizziness. Remainder ROS otherwise negative.    Overnight Events: Patient goes into HARI 120s on exertions. During that time patient denied palpitations, SOB and dizziness. HR decreased on its own while patient was at rest.     TELEMETRY: AFib 80s-120s    EKG: Afib with LVH, no acute ischemic changes noted       VITAL SIGNS:  T(C): 36.2 (02-05-18 @ 13:14), Max: 37.1 (02-04-18 @ 22:21)  HR: 102 (02-05-18 @ 13:37) (74 - 107)  BP: 141/84 (02-05-18 @ 13:37) (125/71 - 159/77)  RR: 17 (02-05-18 @ 13:37) (16 - 17)  SpO2: 95% (02-05-18 @ 13:37) (92% - 98%)  Wt(kg): --    I&O's Summary    04 Feb 2018 07:01  -  05 Feb 2018 07:00  --------------------------------------------------------  IN: 1080 mL / OUT: 3290 mL / NET: -2210 mL    05 Feb 2018 07:01  -  05 Feb 2018 14:39  --------------------------------------------------------  IN: 1057 mL / OUT: 1300 mL / NET: -243 mL          PHYSICAL EXAM:    General: No acute Distress  Neck: Supple, NO JVD  Cardiac: S1 S2, No M/R/G  Pulmonary: Diminished BL bases. Bibasilar crackles. No wheezing   Abdomen: Soft, Non -tender, +BS x 4 quads  Extremities: No Rashes, +2 pitting edema BL LE  Neuro: A/o x 3, No focal deficits          LABS:                          13.7   7.4   )-----------( 173      ( 05 Feb 2018 06:44 )             42.0                              02-05    136  |  96  |  20  ----------------------------<  102<H>  3.9   |  27  |  0.75    Ca    9.2      05 Feb 2018 06:45  Mg     1.9     02-05    TPro  8.3  /  Alb  4.1  /  TBili  0.5  /  DBili  x   /  AST  36  /  ALT  20  /  AlkPhos  104  02-03    LIVER FUNCTIONS - ( 03 Feb 2018 15:02 )  Alb: 4.1 g/dL / Pro: 8.3 g/dL / ALK PHOS: 104 U/L / ALT: 20 U/L / AST: 36 U/L / GGT: x                                 PT/INR - ( 03 Feb 2018 15:02 )   PT: 16.5 sec;   INR: 1.47          PTT - ( 03 Feb 2018 15:02 )  PTT:36.2 sec  CAPILLARY BLOOD GLUCOSE        CARDIAC MARKERS ( 03 Feb 2018 20:41 )  x     / <0.01 ng/mL / x     / x     / x      CARDIAC MARKERS ( 03 Feb 2018 15:02 )  x     / <0.01 ng/mL / 108 U/L / x     / 4.3 ng/mL          Allergies:  penicillin (Rash)    MEDICATIONS  (STANDING):  apixaban 5 milliGRAM(s) Oral every 12 hours  atorvastatin 10 milliGRAM(s) Oral at bedtime  diltiazem    Tablet 60 milliGRAM(s) Oral every 6 hours  furosemide   Injectable 20 milliGRAM(s) IV Push two times a day  valsartan 160 milliGRAM(s) Oral daily    MEDICATIONS  (PRN):        DIAGNOSTIC TESTS:     < from: Echocardiogram (02.05.18 @ 10:55) >  The left atrium is moderately dilated. The right atrium is severely dilated.There is mild aortic valve thickening. There appears to be a  small 5mm mobile echodensity  attached to the aortic side of the left coronary cusp. Differential includes Lambl's excresence Vs small fibroelastoma. No aortic regurgitation noted. No hemodynamically significant valvular aortic stenosis. There is mild mitral valve thickening. There is mild mitral annular calcification. There is mild mitral regurgitation.  Structurally normal tricuspid valve. There is moderate tricuspid regurgitation.  The pulmonary artery systolic pressure is estimated to be 49 mmHg. This study shows evidence of pulmonary hypertension.  The pulmonic valve is not well visualized. No pulmonic regurgitation noted.The right ventricle is normal in size and function.There is moderate concentric left ventricular hypertrophy. The left ventricular wall motion is normal. The left ventricular ejection fraction is estimated to be 50-55%. Unable to determine diastolic function due to   atrial fibrillation.  No aortic root dilatation.A trivial pericardial effusion adjacent to the right atrium noted.No prior study for comparison. CARDIOLOGY NP PROGRESS NOTE    Subjective:   Patient seen and examined at bedside. Patient states feeling well without SOB. Denies CP, palpitations and dizziness. Remainder ROS otherwise negative.    Overnight Events: Patient goes into Rapid AF 120s on exertion walking to bathroom. During that time patient denied palpitations, SOB and dizziness. HR decreased on its own while patient was at rest.     TELEMETRY: AFib 80s-120s    EKG: Afib with LVH, no acute ischemic changes noted       VITAL SIGNS:  T(C): 36.2 (02-05-18 @ 13:14), Max: 37.1 (02-04-18 @ 22:21)  HR: 102 (02-05-18 @ 13:37) (74 - 107)  BP: 141/84 (02-05-18 @ 13:37) (125/71 - 159/77)  RR: 17 (02-05-18 @ 13:37) (16 - 17)  SpO2: 95% (02-05-18 @ 13:37) (92% - 98%)  Wt(kg): --    I&O's Summary    04 Feb 2018 07:01  -  05 Feb 2018 07:00  --------------------------------------------------------  IN: 1080 mL / OUT: 3290 mL / NET: -2210 mL    05 Feb 2018 07:01  -  05 Feb 2018 14:39  --------------------------------------------------------  IN: 1057 mL / OUT: 1300 mL / NET: -243 mL          PHYSICAL EXAM:    General: No acute Distress  Neck: Supple, NO JVD  Cardiac: S1 S2, No M/R/G  Pulmonary: Diminished BL bases. Bibasilar crackles. No wheezing   Abdomen: Soft, Non -tender, +BS x 4 quads  Extremities: No Rashes, +2 pitting edema BL LE  Neuro: A/o x 3, No focal deficits          LABS:                          13.7   7.4   )-----------( 173      ( 05 Feb 2018 06:44 )             42.0                              02-05    136  |  96  |  20  ----------------------------<  102<H>  3.9   |  27  |  0.75    Ca    9.2      05 Feb 2018 06:45  Mg     1.9     02-05    TPro  8.3  /  Alb  4.1  /  TBili  0.5  /  DBili  x   /  AST  36  /  ALT  20  /  AlkPhos  104  02-03    LIVER FUNCTIONS - ( 03 Feb 2018 15:02 )  Alb: 4.1 g/dL / Pro: 8.3 g/dL / ALK PHOS: 104 U/L / ALT: 20 U/L / AST: 36 U/L / GGT: x                                 PT/INR - ( 03 Feb 2018 15:02 )   PT: 16.5 sec;   INR: 1.47          PTT - ( 03 Feb 2018 15:02 )  PTT:36.2 sec  CAPILLARY BLOOD GLUCOSE        CARDIAC MARKERS ( 03 Feb 2018 20:41 )  x     / <0.01 ng/mL / x     / x     / x      CARDIAC MARKERS ( 03 Feb 2018 15:02 )  x     / <0.01 ng/mL / 108 U/L / x     / 4.3 ng/mL          Allergies:  penicillin (Rash)    MEDICATIONS  (STANDING):  apixaban 5 milliGRAM(s) Oral every 12 hours  atorvastatin 10 milliGRAM(s) Oral at bedtime  diltiazem    Tablet 60 milliGRAM(s) Oral every 6 hours  furosemide   Injectable 20 milliGRAM(s) IV Push two times a day  valsartan 160 milliGRAM(s) Oral daily    MEDICATIONS  (PRN):        DIAGNOSTIC TESTS:     < from: Echocardiogram (02.05.18 @ 10:55) >  The left atrium is moderately dilated. The right atrium is severely dilated.There is mild aortic valve thickening. There appears to be a  small 5mm mobile echodensity  attached to the aortic side of the left coronary cusp. Differential includes Lambl's excresence Vs small fibroelastoma. No aortic regurgitation noted. No hemodynamically significant valvular aortic stenosis. There is mild mitral valve thickening. There is mild mitral annular calcification. There is mild mitral regurgitation.  Structurally normal tricuspid valve. There is moderate tricuspid regurgitation.  The pulmonary artery systolic pressure is estimated to be 49 mmHg. This study shows evidence of pulmonary hypertension.  The pulmonic valve is not well visualized. No pulmonic regurgitation noted.The right ventricle is normal in size and function.There is moderate concentric left ventricular hypertrophy. The left ventricular wall motion is normal. The left ventricular ejection fraction is estimated to be 50-55%. Unable to determine diastolic function due to   atrial fibrillation.  No aortic root dilatation.A trivial pericardial effusion adjacent to the right atrium noted.No prior study for comparison.

## 2018-02-05 NOTE — PROGRESS NOTE ADULT - ATTENDING COMMENTS
CC: sob  ROS: 12 point ROS otherwise negative except as stated above in subjective  Agree with above.
CC: afib  ROS: 12 point ROS otherwise negative except as stated above in subjective  Agree with above.

## 2018-02-05 NOTE — PROGRESS NOTE ADULT - PROBLEM SELECTOR PLAN 2
Patients [id number]= 5  - Continue with home anticoagulation with Eliquis 5mg q 12  - Rate controlled at home with diltiazem, as per daughter was on metoprolol however patient became altered on this medication.  Restarted PO Cardizem 60 mg Q6h (was held in setting of acute CHF exacerbation)  - GIOVANNA Hardwick consulted for possible YAMEL/Cardioversion as patient HR increases to 130s on exertion, will f/u recs Patients [id number]= 5  - Continue with home anticoagulation with Eliquis 5mg q 12  - Rate controlled at home with diltiazem, as per daughter was on metoprolol however patient became altered on this medication.  Restarted PO Cardizem 60 mg Q6h (was held in setting of acute CHF exacerbation)  - EP Dr. Hradwick consulted for possible YAMEL/Cardioversion as patient HR increases to 130s on exertion, will f/u recs Patients [id number]= 5  - Continue with home anticoagulation with Eliquis 5mg q 12  - Rate controlled at home with diltiazem, as per daughter was on metoprolol however patient became altered on this medication.  Restarted PO Cardizem 60 mg Q6h (home dose 180mg qd was held in setting of acute CHF exacerbation)  - EP Dr. Hardwick consulted for possible YAMEL/Cardioversion as patient HR increases to 130s on exertion, will f/u recs

## 2018-02-05 NOTE — PROGRESS NOTE ADULT - ASSESSMENT
89yo F w/ PMHx of afib on eliquis, CHF unknown EF, HTN recently admitted [place] found to be in rapid afib 3 months ago now controlled with PO diltiazem, who presents for worsening SOB and LE edema, admitted to tele  for acute on chronic CHF exacerbation.

## 2018-02-05 NOTE — DISCHARGE NOTE ADULT - PLAN OF CARE
You were admitted to the hospital for shortness of breath due to congestive heart failure. You were given intravenous Lasix to get rid of the fluid in your lungs and to help you breathe better. Please take Lasix as prescribed without missing doses. Please continue to monitor your BP and hold the Valsartan for systolic BP less than 90. Please maintain a low salt diet (less than 2grams per day). Weigh yourself daily and report any weight gain over 2 pounds/day to your Doctor. Please follow up with Dr. Hardwick on March 1 at 9AM Please follow up with Dr. Kemp within 1- 2 weeks of discharge You were admitted to the hospital for shortness of breath due to congestive heart failure. You were given intravenous Lasix to get rid of the fluid in your lungs and to help you breathe better. Please take Lasix as prescribed without missing doses. Please maintain a low salt diet (less than 2grams per day). Weigh yourself daily and report any weight gain over 2 pounds/day to your Doctor. You were admitted to the hospital with an irregular fast heart beat. You were given medications while in the hospital to slow down your heart rate. Please continue to take the cardizem as prescribed. Please continue to take the eliquis as prescribed. Please follow up with Dr. Hardwick on march 1 at 9AM. Please continue to take your medications as prescribed. While in the hospital you were started on Atorvastatin to help protect your heart. Your lipid panel was normal, however, this medication will help prevent coronary disease. Please continue to take this medication as prescribed. You were admitted to the hospital with an irregular fast heart beat. You were given medications while in the hospital to slow down your heart rate. Please continue to take the Cardizem as prescribed. Please continue to take the eliquis as prescribed. Please follow up with Dr. Hardwick on march 1 at 9AM. Please continue to take your medications and prescribed and follow up with Dr. Kemp within 1-2 weeks of discharge On admission to the hospital, your BP was elevated. Please continue to take Valsartan which you were taking while at home. You will STOP taking Atenolol. You will continue to take Cardizem as prescribed. Please check your BP while at home before taking these medications and hold the Valsartan for systolic BP < 90. You were admitted to the hospital for shortness of breath due to congestive heart failure. You were given intravenous Lasix to get rid of the fluid in your lungs and to help you breathe better. Please take Lasix as prescribed without missing doses. Please maintain a low salt diet (less than 2grams per day). Weigh yourself daily and report any weight gain over 2 pounds in a day or within a week to your Doctor. You were admitted to the hospital with an irregular fast heart beat. You were given medications while in the hospital to slow down your heart rate. Please continue to take the Cardizem  mg every 12 hours as prescribed. Please continue to also take the eliquis as prescribed. Please follow up with Dr. Hardwick on march 1 at 9AM. Please follow up with Cardiologist Dr. Kemp on February 14th at 2:10pm Please follow up with Electrophysiologist Dr. Hardwick on March 1st at 9AM You were admitted to the hospital with an irregular fast heart beat. You were given medications while in the hospital to slow down your heart rate. Please continue to take the Cardizem  mg every 12 hours as prescribed. Please continue to also take the blood thinner medication Eliquis as prescribed. Please follow up with Dr. Hardwick on March 1st at 9AM. Please continue to take your medications and prescribed and follow up with Dr. Kemp on February 14th at 2:10pm On admission to the hospital, your blood pressure was elevated. Please continue to take Valsartan which you were taking while at home. You will STOP taking Atenolol and switch to Cardizem as prescribed. Monitor for signs of low blood pressure: lightheadedness, dizziness. Please check your blood pressure while at home before taking these medications and hold the Valsartan for systolic blood pressure (top number) less than 90. While in the hospital you were started on Atorvastatin (Lipitor) to help protect your heart. Your lipid panel was normal, however, this medication will help prevent coronary disease or plaque buildup in your arteries. Please continue to take this medication as prescribed.

## 2018-02-05 NOTE — DISCHARGE NOTE ADULT - CARE PLAN
Principal Discharge DX:	Acute on chronic diastolic congestive heart failure  Assessment and plan of treatment:	You were admitted to the hospital for shortness of breath due to congestive heart failure. You were given intravenous Lasix to get rid of the fluid in your lungs and to help you breathe better. Please take Lasix as prescribed without missing doses. Please continue to monitor your BP and hold the Valsartan for systolic BP less than 90. Please maintain a low salt diet (less than 2grams per day). Weigh yourself daily and report any weight gain over 2 pounds/day to your Doctor.  Secondary Diagnosis:	Atrial fibrillation Principal Discharge DX:	Acute on chronic diastolic congestive heart failure  Assessment and plan of treatment:	You were admitted to the hospital for shortness of breath due to congestive heart failure. You were given intravenous Lasix to get rid of the fluid in your lungs and to help you breathe better. Please take Lasix as prescribed without missing doses. Please continue to monitor your BP and hold the Valsartan for systolic BP less than 90. Please maintain a low salt diet (less than 2grams per day). Weigh yourself daily and report any weight gain over 2 pounds/day to your Doctor.  Secondary Diagnosis:	Atrial fibrillation  Goal:	Please follow up with Dr. Hardwick on March 1 at 9AM Principal Discharge DX:	Acute on chronic diastolic congestive heart failure  Goal:	Please follow up with Dr. Kemp within 1- 2 weeks of discharge  Assessment and plan of treatment:	You were admitted to the hospital for shortness of breath due to congestive heart failure. You were given intravenous Lasix to get rid of the fluid in your lungs and to help you breathe better. Please take Lasix as prescribed without missing doses. Please maintain a low salt diet (less than 2grams per day). Weigh yourself daily and report any weight gain over 2 pounds/day to your Doctor.  Secondary Diagnosis:	Atrial fibrillation  Goal:	Please follow up with Dr. Hardwick on March 1 at 9AM  Assessment and plan of treatment:	You were admitted to the hospital with an irregular fast heart beat. You were given medications while in the hospital to slow down your heart rate. Please continue to take the cardizem as prescribed. Please continue to take the eliquis as prescribed. Please follow up with Dr. Hardwick on march 1 at 9AM. Principal Discharge DX:	Acute on chronic diastolic congestive heart failure  Goal:	Please follow up with Dr. Kemp within 1- 2 weeks of discharge  Assessment and plan of treatment:	You were admitted to the hospital for shortness of breath due to congestive heart failure. You were given intravenous Lasix to get rid of the fluid in your lungs and to help you breathe better. Please take Lasix as prescribed without missing doses. Please maintain a low salt diet (less than 2grams per day). Weigh yourself daily and report any weight gain over 2 pounds/day to your Doctor.  Secondary Diagnosis:	Atrial fibrillation  Goal:	Please follow up with Dr. Hardwick on March 1 at 9AM  Assessment and plan of treatment:	You were admitted to the hospital with an irregular fast heart beat. You were given medications while in the hospital to slow down your heart rate. Please continue to take the Cardizem as prescribed. Please continue to take the eliquis as prescribed. Please follow up with Dr. Hardwick on march 1 at 9AM.  Secondary Diagnosis:	HTN (hypertension)  Goal:	Please continue to take your medications and prescribed and follow up with Dr. Kemp within 1-2 weeks of discharge  Assessment and plan of treatment:	On admission to the hospital, your BP was elevated. Please continue to take Valsartan which you were taking while at home. You will STOP taking Atenolol. You will continue to take Cardizem as prescribed. Please check your BP while at home before taking these medications and hold the Valsartan for systolic BP < 90.  Secondary Diagnosis:	Prophylactic measure  Goal:	Please continue to take your medications as prescribed.  Assessment and plan of treatment:	While in the hospital you were started on Atorvastatin to help protect your heart. Your lipid panel was normal, however, this medication will help prevent coronary disease. Please continue to take this medication as prescribed. Principal Discharge DX:	Acute on chronic diastolic congestive heart failure  Goal:	Please follow up with Dr. Kemp within 1- 2 weeks of discharge  Assessment and plan of treatment:	You were admitted to the hospital for shortness of breath due to congestive heart failure. You were given intravenous Lasix to get rid of the fluid in your lungs and to help you breathe better. Please take Lasix as prescribed without missing doses. Please maintain a low salt diet (less than 2grams per day). Weigh yourself daily and report any weight gain over 2 pounds in a day or within a week to your Doctor.  Secondary Diagnosis:	Atrial fibrillation  Goal:	Please follow up with Dr. Hardwick on March 1 at 9AM  Assessment and plan of treatment:	You were admitted to the hospital with an irregular fast heart beat. You were given medications while in the hospital to slow down your heart rate. Please continue to take the Cardizem  mg every 12 hours as prescribed. Please continue to also take the eliquis as prescribed. Please follow up with Dr. Hardwick on march 1 at 9AM.  Secondary Diagnosis:	HTN (hypertension)  Goal:	Please continue to take your medications and prescribed and follow up with Dr. Kemp within 1-2 weeks of discharge  Assessment and plan of treatment:	On admission to the hospital, your BP was elevated. Please continue to take Valsartan which you were taking while at home. You will STOP taking Atenolol. You will continue to take Cardizem as prescribed. Please check your BP while at home before taking these medications and hold the Valsartan for systolic BP < 90.  Secondary Diagnosis:	Prophylactic measure  Goal:	Please continue to take your medications as prescribed.  Assessment and plan of treatment:	While in the hospital you were started on Atorvastatin to help protect your heart. Your lipid panel was normal, however, this medication will help prevent coronary disease. Please continue to take this medication as prescribed. Principal Discharge DX:	Acute on chronic diastolic congestive heart failure  Goal:	Please follow up with Cardiologist Dr. Kemp on February 14th at 2:10pm  Assessment and plan of treatment:	You were admitted to the hospital for shortness of breath due to congestive heart failure. You were given intravenous Lasix to get rid of the fluid in your lungs and to help you breathe better. Please take Lasix as prescribed without missing doses. Please maintain a low salt diet (less than 2grams per day). Weigh yourself daily and report any weight gain over 2 pounds in a day or within a week to your Doctor.  Secondary Diagnosis:	Atrial fibrillation  Goal:	Please follow up with Electrophysiologist Dr. Hardwick on March 1st at 9AM  Assessment and plan of treatment:	You were admitted to the hospital with an irregular fast heart beat. You were given medications while in the hospital to slow down your heart rate. Please continue to take the Cardizem  mg every 12 hours as prescribed. Please continue to also take the blood thinner medication Eliquis as prescribed. Please follow up with Dr. Hardwick on March 1st at 9AM.  Secondary Diagnosis:	HTN (hypertension)  Goal:	Please continue to take your medications and prescribed and follow up with Dr. Kemp on February 14th at 2:10pm  Assessment and plan of treatment:	On admission to the hospital, your blood pressure was elevated. Please continue to take Valsartan which you were taking while at home. You will STOP taking Atenolol and switch to Cardizem as prescribed. Monitor for signs of low blood pressure: lightheadedness, dizziness. Please check your blood pressure while at home before taking these medications and hold the Valsartan for systolic blood pressure (top number) less than 90.  Secondary Diagnosis:	Prophylactic measure  Goal:	Please continue to take your medications as prescribed.  Assessment and plan of treatment:	While in the hospital you were started on Atorvastatin (Lipitor) to help protect your heart. Your lipid panel was normal, however, this medication will help prevent coronary disease or plaque buildup in your arteries. Please continue to take this medication as prescribed.

## 2018-02-05 NOTE — DISCHARGE NOTE ADULT - ADDITIONAL INSTRUCTIONS
1. Please follow up with Cardiologist Ila Garcia (MD), Internal Medicine  [address] [id number]  Phone: [phone]      2. Please follow up with Electrophysiologist Dr Hardwick as scheduled on 3/1/18 at 9am.  Merlin Hardwick ([facility]), Cardiac Electrophysiology; Internal Medicine  [address] [facility] [address] [place], [place] [id number]  Phone: [phone] 1. Please follow up with Cardiologist Dr Kemp as scheduled on February 14th, 2018 at 2:10pm  Ila Kemp), Internal Medicine  [address] [id number]  Phone: [phone]      2. Please follow up with Electrophysiologist Dr Hardwick as scheduled on 3/1/18 at 9am.  Merlin Hardwick ([facility]), Cardiac Electrophysiology; Internal Medicine  [address] [place] [address] [address] [place], [place] [id number]  Phone: [phone]

## 2018-02-05 NOTE — DISCHARGE NOTE ADULT - CARE PROVIDER_API CALL
Ila Kemp), Internal Medicine  [address] [id number]  Phone: [phone]  Fax: [phone]    Merlin Hardwick ([facility]), Cardiac Electrophysiology; Internal Medicine  [address] [place] [id number]  Phone: [phone]  Fax: [phone]

## 2018-02-05 NOTE — DISCHARGE NOTE ADULT - PATIENT PORTAL LINK FT
You can access the [facility] Patient Portal, offered by [facility], by registering with the following website: [email or web address]

## 2018-02-05 NOTE — DISCHARGE NOTE ADULT - MEDICATION SUMMARY - MEDICATIONS TO TAKE
I will START or STAY ON the medications listed below when I get home from the hospital:    valsartan 160 mg oral tablet  -- 1 tab(s) by mouth once a day  -- Indication: For HTN (hypertension)    Cardizem  mg/24 hours oral capsule, extended release  -- 1 cap(s) by mouth 2 times a day   -- Indication: For Atrial fibrillation    Eliquis 5 mg oral tablet  -- Indication: For Atrial fibrillation    atorvastatin 10 mg oral tablet  -- 1 tab(s) by mouth once a day (at bedtime)  -- Indication: For Hyperlipidemia     furosemide 20 mg oral tablet  -- Indication: For Heart Failure I will START or STAY ON the medications listed below when I get home from the hospital:    valsartan 160 mg oral tablet  -- 1 tab(s) by mouth once a day  -- Indication: For HTN (hypertension)    Cardizem  mg/24 hours oral capsule, extended release  -- 1 cap(s) by mouth 2 times a day   -- Indication: For Atrial fibrillation    Eliquis 5 mg oral tablet  -- Indication: For Atrial fibrillation    atorvastatin 10 mg oral tablet  -- 1 tab(s) by mouth once a day (at bedtime)  -- Indication: For Prophylactic measure Heart protection    furosemide 20 mg oral tablet  -- Indication: For Heart Failure I will START or STAY ON the medications listed below when I get home from the hospital:    valsartan 160 mg oral tablet  -- 1 tab(s) by mouth once a day  -- Indication: For HTN (hypertension)    Cardizem  mg/24 hours oral capsule, extended release  -- 1 cap(s) by mouth 2 times a day   -- Indication: For Atrial fibrillation    Eliquis 5 mg oral tablet  -- 1 tab(s) by mouth 2 times a day  -- Indication: For Atrial fibrillation    atorvastatin 10 mg oral tablet  -- 1 tab(s) by mouth once a day (at bedtime)  -- Indication: For Heart Protection     furosemide 20 mg oral tablet  -- 1 tab(s) by mouth once a day  -- Indication: For CHF (congestive heart failure)

## 2018-02-05 NOTE — DISCHARGE NOTE ADULT - MEDICATION SUMMARY - MEDICATIONS TO CHANGE
I will SWITCH the dose or number of times a day I take the medications listed below when I get home from the hospital:    dilTIAZem 180 mg/24 hours oral capsule, extended release

## 2018-02-05 NOTE — PROGRESS NOTE ADULT - PROBLEM SELECTOR PLAN 3
- C/w home valsartan 160 daily  - Holding home atenolol and consider restarting as BP tolerates   - PO Cardizem  60 mg Q6h restarted     # ASCVD 37% if adjust age for 79 years  - Patient started on Lipitor 10 mg q HS - C/w home valsartan 160 daily  - Holding home atenolol and consider restarting as BP tolerates   - PO Cardizem 60 mg Q6h restarted     # ASCVD 37% if adjust age for 79 years  - Patient started on Lipitor 10 mg q HS

## 2018-02-05 NOTE — PROGRESS NOTE ADULT - PROBLEM SELECTOR PLAN 1
Pt admitted for acute on chronic exacerbation of CHF possibly 2/2 medications (not optimized on current home regimen) vs. tachyarrhythmias vs. dietary noncompliance.   - C/w diuresis with lasix 20mg IV BID, net neg 2.2 L over 24 hours   - Patient received extra dose IV lasix 20 mg this AM   - Attempt to wean off O2 as congestion improves (pt not on home O2)  - Restarted PO Cardizem 60 mg Q6h (was held in setting of acute CHF exacerbation)   - Coreg discontinued as Cardizem was resumed   - C/w home valsartan 160mg qday, will attempt to uptitrate as tolerates  - Encourage use of IS to prevent atelectasis   - Strict I/O's, daily weights   - ECHO results as above. Differential includes Lambl's excresence Vs small fibroelastoma. Plan to medically optimize patient for CHF exacerbation at this time, without plan for YAMEL given patient being anticoagulated with Eliquis Pt admitted for acute on chronic exacerbation of CHF possibly 2/2 medications (not optimized on current home regimen) vs. tachyarrhythmias vs. dietary noncompliance.   - C/w diuresis with lasix 20mg IV BID, net neg 2.2 L over 24 hours   - Patient received extra dose IV lasix 20 mg this AM   - Attempt to wean off O2 as congestion improves (pt not on home O2)  - Restarted PO Cardizem 60 mg Q6h (home dose 180mg qd was held in setting of acute CHF exacerbation)   - Coreg discontinued as Cardizem was resumed   - C/w home valsartan 160mg qday, will attempt to uptitrate as BP tolerates  - Encourage use of IS to prevent atelectasis   - Strict I/O's, daily weights   - ECHO results as above. EF 50-55%, mod dilated La, severe dilated RA, small 5mm mobile echodensity to aortic side of L coronary cusp, Differential includes Lambl's excresence Vs small fibroelastoma. Plan to medically optimize patient for CHF exacerbation at this time, without plan for YAMEL given patient already anticoagulated with Eliquis

## 2018-02-06 LAB
ANION GAP SERPL CALC-SCNC: 11 MMOL/L — SIGNIFICANT CHANGE UP (ref 5–17)
BUN SERPL-MCNC: 22 MG/DL — SIGNIFICANT CHANGE UP (ref 7–23)
CALCIUM SERPL-MCNC: 9.2 MG/DL — SIGNIFICANT CHANGE UP (ref 8.4–10.5)
CHLORIDE SERPL-SCNC: 96 MMOL/L — SIGNIFICANT CHANGE UP (ref 96–108)
CO2 SERPL-SCNC: 28 MMOL/L — SIGNIFICANT CHANGE UP (ref 22–31)
CREAT SERPL-MCNC: 0.81 MG/DL — SIGNIFICANT CHANGE UP (ref 0.5–1.3)
GLUCOSE SERPL-MCNC: 107 MG/DL — HIGH (ref 70–99)
HCT VFR BLD CALC: 44.4 % — SIGNIFICANT CHANGE UP (ref 34.5–45)
HGB BLD-MCNC: 14.4 G/DL — SIGNIFICANT CHANGE UP (ref 11.5–15.5)
MAGNESIUM SERPL-MCNC: 2 MG/DL — SIGNIFICANT CHANGE UP (ref 1.6–2.6)
MCHC RBC-ENTMCNC: 30.8 PG — SIGNIFICANT CHANGE UP (ref 27–34)
MCHC RBC-ENTMCNC: 32.4 G/DL — SIGNIFICANT CHANGE UP (ref 32–36)
MCV RBC AUTO: 94.9 FL — SIGNIFICANT CHANGE UP (ref 80–100)
PLATELET # BLD AUTO: 171 K/UL — SIGNIFICANT CHANGE UP (ref 150–400)
POTASSIUM SERPL-MCNC: 3.9 MMOL/L — SIGNIFICANT CHANGE UP (ref 3.5–5.3)
POTASSIUM SERPL-SCNC: 3.9 MMOL/L — SIGNIFICANT CHANGE UP (ref 3.5–5.3)
RBC # BLD: 4.68 M/UL — SIGNIFICANT CHANGE UP (ref 3.8–5.2)
RBC # FLD: 13.5 % — SIGNIFICANT CHANGE UP (ref 10.3–16.9)
SODIUM SERPL-SCNC: 135 MMOL/L — SIGNIFICANT CHANGE UP (ref 135–145)
WBC # BLD: 7.4 K/UL — SIGNIFICANT CHANGE UP (ref 3.8–10.5)
WBC # FLD AUTO: 7.4 K/UL — SIGNIFICANT CHANGE UP (ref 3.8–10.5)

## 2018-02-06 PROCEDURE — 99233 SBSQ HOSP IP/OBS HIGH 50: CPT

## 2018-02-06 PROCEDURE — 71045 X-RAY EXAM CHEST 1 VIEW: CPT | Mod: 26

## 2018-02-06 RX ORDER — DILTIAZEM HCL 120 MG
120 CAPSULE, EXT RELEASE 24 HR ORAL
Qty: 0 | Refills: 0 | Status: DISCONTINUED | OUTPATIENT
Start: 2018-02-06 | End: 2018-02-07

## 2018-02-06 RX ORDER — POTASSIUM CHLORIDE 20 MEQ
20 PACKET (EA) ORAL ONCE
Qty: 0 | Refills: 0 | Status: COMPLETED | OUTPATIENT
Start: 2018-02-06 | End: 2018-02-06

## 2018-02-06 RX ADMIN — Medication 20 MILLIGRAM(S): at 06:08

## 2018-02-06 RX ADMIN — Medication 20 MILLIEQUIVALENT(S): at 09:40

## 2018-02-06 RX ADMIN — APIXABAN 5 MILLIGRAM(S): 2.5 TABLET, FILM COATED ORAL at 06:08

## 2018-02-06 RX ADMIN — APIXABAN 5 MILLIGRAM(S): 2.5 TABLET, FILM COATED ORAL at 18:03

## 2018-02-06 RX ADMIN — ATORVASTATIN CALCIUM 10 MILLIGRAM(S): 80 TABLET, FILM COATED ORAL at 21:16

## 2018-02-06 RX ADMIN — VALSARTAN 160 MILLIGRAM(S): 80 TABLET ORAL at 06:08

## 2018-02-06 RX ADMIN — Medication 120 MILLIGRAM(S): at 18:03

## 2018-02-06 RX ADMIN — Medication 20 MILLIGRAM(S): at 18:03

## 2018-02-06 NOTE — PROGRESS NOTE ADULT - SUBJECTIVE AND OBJECTIVE BOX
CARDIOLOGY NP PROGRESS NOTE    Subjective: Patient seen and examined at bedside.    Remainder ROS otherwise negative.    Overnight Events:     TELEMETRY:    EKG:      VITAL SIGNS:  T(C): 36.1 (02-06-18 @ 11:01), Max: 37.1 (02-05-18 @ 18:25)  HR: 71 (02-06-18 @ 06:14) (70 - 102)  BP: 156/72 (02-06-18 @ 05:03) (106/81 - 156/72)  RR: 18 (02-06-18 @ 06:14) (17 - 18)  SpO2: 91% (02-06-18 @ 06:14) (91% - 95%)  Wt(kg): --    I&O's Summary    05 Feb 2018 07:01  -  06 Feb 2018 07:00  --------------------------------------------------------  IN: 1297 mL / OUT: 2550 mL / NET: -1253 mL    06 Feb 2018 07:01  -  06 Feb 2018 12:40  --------------------------------------------------------  IN: 820 mL / OUT: 1025 mL / NET: -205 mL          PHYSICAL EXAM:    General: A/ox 3, No acute Distress  Neck: Supple, NO JVD  Cardiac: S1 S2, No M/R/G  Pulmonary: CTAB, Breathing unlabored, No Rhonchi/Rales/Wheezing  Abdomen: Soft, Non -tender, +BS x 4 quads  Extremities: No Rashes, No edema  Neuro: A/o x 3, No focal deficits          LABS:                          14.4   7.4   )-----------( 171      ( 06 Feb 2018 07:29 )             44.4                              02-06    135  |  96  |  22  ----------------------------<  107<H>  3.9   |  28  |  0.81    Ca    9.2      06 Feb 2018 07:29  Mg     2.0     02-06                                CAPILLARY BLOOD GLUCOSE                Allergies:  penicillin (Rash)    MEDICATIONS  (STANDING):  apixaban 5 milliGRAM(s) Oral every 12 hours  atorvastatin 10 milliGRAM(s) Oral at bedtime  diltiazem    Tablet 60 milliGRAM(s) Oral every 6 hours  furosemide   Injectable 20 milliGRAM(s) IV Push two times a day  valsartan 160 milliGRAM(s) Oral daily    MEDICATIONS  (PRN):        DIAGNOSTIC TESTS: CARDIOLOGY NP PROGRESS NOTE    Subjective: Patient seen and examined at bedside.   Patient states feeling well without SOB, CP, palpitations and dizziness.  Remainder ROS otherwise negative.    Overnight Events:  Patient weaned off NC and SpO2 maintaining above 93% RA.     TELEMETRY: AFib (70s-80s)    VITAL SIGNS:  T(C): 36.1 (02-06-18 @ 11:01), Max: 37.1 (02-05-18 @ 18:25)  HR: 71 (02-06-18 @ 06:14) (70 - 102)  BP: 156/72 (02-06-18 @ 05:03) (106/81 - 156/72)  RR: 18 (02-06-18 @ 06:14) (17 - 18)  SpO2: 91% (02-06-18 @ 06:14) (91% - 95%)  Wt(kg): --    I&O's Summary    05 Feb 2018 07:01  -  06 Feb 2018 07:00  --------------------------------------------------------  IN: 1297 mL / OUT: 2550 mL / NET: -1253 mL    06 Feb 2018 07:01  -  06 Feb 2018 12:40  --------------------------------------------------------  IN: 820 mL / OUT: 1025 mL / NET: -205 mL          PHYSICAL EXAM:    General: No acute Distress  Neck: Supple, NO JVD  Cardiac: S1 S2, No M/R/G  Pulmonary: Diminished breath sounds. Breathing unlabored. No Wheezing.   Abdomen: Soft, Non -tender, +BS x 4 quads  Extremities: +2 BL LE edema. No Rashes.   Neuro: A/o x 3, No focal deficits          LABS:                          14.4   7.4   )-----------( 171      ( 06 Feb 2018 07:29 )             44.4                              02-06    135  |  96  |  22  ----------------------------<  107<H>  3.9   |  28  |  0.81    Ca    9.2      06 Feb 2018 07:29  Mg     2.0     02-06                                CAPILLARY BLOOD GLUCOSE                Allergies:  penicillin (Rash)    MEDICATIONS  (STANDING):  apixaban 5 milliGRAM(s) Oral every 12 hours  atorvastatin 10 milliGRAM(s) Oral at bedtime  diltiazem    Tablet 60 milliGRAM(s) Oral every 6 hours  furosemide   Injectable 20 milliGRAM(s) IV Push two times a day  valsartan 160 milliGRAM(s) Oral daily    MEDICATIONS  (PRN):        DIAGNOSTIC TESTS:   < from: Xray Chest 1 View- PORTABLE-Urgent (02.06.18 @ 09:13) >  FINDINGS: Portable view of the chest is compared to/5/2018 and   demonstrates interval resolution of previous demonstrated pulmonary   edema. Interval decrease in now small residual layering bilateral pleural   effusions with bibasilar atelectasis. Midline trachea. Cardiomegaly. No   narciso consolidation.    < end of copied text > CARDIOLOGY NP PROGRESS NOTE    Subjective: Patient seen and examined at bedside.   Patient states feeling well without SOB, CP, palpitations and dizziness.  Remainder ROS otherwise negative.    Overnight Events:  Patient weaned off NC and maintaining SpO2 above 93% RA.     TELEMETRY: AFib (70s-80s)    VITAL SIGNS:  T(C): 36.1 (02-06-18 @ 11:01), Max: 37.1 (02-05-18 @ 18:25)  HR: 71 (02-06-18 @ 06:14) (70 - 102)  BP: 156/72 (02-06-18 @ 05:03) (106/81 - 156/72)  RR: 18 (02-06-18 @ 06:14) (17 - 18)  SpO2: 91% (02-06-18 @ 06:14) (91% - 95%)  Wt(kg): --    I&O's Summary    05 Feb 2018 07:01  -  06 Feb 2018 07:00  --------------------------------------------------------  IN: 1297 mL / OUT: 2550 mL / NET: -1253 mL    06 Feb 2018 07:01  -  06 Feb 2018 12:40  --------------------------------------------------------  IN: 820 mL / OUT: 1025 mL / NET: -205 mL          PHYSICAL EXAM:    General: No acute Distress  Neck: Supple, NO JVD  Cardiac: S1 S2, No M/R/G  Pulmonary: Diminished breath sounds. Breathing unlabored. No Wheezing.   Abdomen: Soft, Non -tender, +BS x 4 quads  Extremities: +2 BL LE edema. No Rashes.   Neuro: A/o x 3, No focal deficits          LABS:                          14.4   7.4   )-----------( 171      ( 06 Feb 2018 07:29 )             44.4                              02-06    135  |  96  |  22  ----------------------------<  107<H>  3.9   |  28  |  0.81    Ca    9.2      06 Feb 2018 07:29  Mg     2.0     02-06                                CAPILLARY BLOOD GLUCOSE                Allergies:  penicillin (Rash)    MEDICATIONS  (STANDING):  apixaban 5 milliGRAM(s) Oral every 12 hours  atorvastatin 10 milliGRAM(s) Oral at bedtime  diltiazem    Tablet 60 milliGRAM(s) Oral every 6 hours  furosemide   Injectable 20 milliGRAM(s) IV Push two times a day  valsartan 160 milliGRAM(s) Oral daily    MEDICATIONS  (PRN):        DIAGNOSTIC TESTS:   < from: Xray Chest 1 View- PORTABLE-Urgent (02.06.18 @ 09:13) >  FINDINGS: Portable view of the chest is compared to/5/2018 and   demonstrates interval resolution of previous demonstrated pulmonary   edema. Interval decrease in now small residual layering bilateral pleural   effusions with bibasilar atelectasis. Midline trachea. Cardiomegaly. No   narciso consolidation.    < end of copied text > CARDIOLOGY NP PROGRESS NOTE    Subjective: Patient seen and examined at bedside.   Patient states feeling well without SOB, CP, palpitations and dizziness.  Remainder ROS otherwise negative.    Overnight Events:  Patient weaned off NC and maintaining SpO2 above 95% RA.     TELEMETRY: AFib (70s-80s)    VITAL SIGNS:  T(C): 36.1 (02-06-18 @ 11:01), Max: 37.1 (02-05-18 @ 18:25)  HR: 71 (02-06-18 @ 06:14) (70 - 102)  BP: 156/72 (02-06-18 @ 05:03) (106/81 - 156/72)  RR: 18 (02-06-18 @ 06:14) (17 - 18)  SpO2: 91% (02-06-18 @ 06:14) (91% - 95%)  Wt(kg): --    I&O's Summary    05 Feb 2018 07:01  -  06 Feb 2018 07:00  --------------------------------------------------------  IN: 1297 mL / OUT: 2550 mL / NET: -1253 mL    06 Feb 2018 07:01  -  06 Feb 2018 12:40  --------------------------------------------------------  IN: 820 mL / OUT: 1025 mL / NET: -205 mL          PHYSICAL EXAM:    General: No acute Distress  Neck: Supple, NO JVD  Cardiac: S1 S2, No M/R/G  Pulmonary: Diminished breath sounds. Breathing unlabored. No Wheezing.   Abdomen: Soft, Non -tender, +BS x 4 quads  Extremities: +2 BL LE edema. No Rashes.   Neuro: A/o x 3, No focal deficits          LABS:                          14.4   7.4   )-----------( 171      ( 06 Feb 2018 07:29 )             44.4                              02-06    135  |  96  |  22  ----------------------------<  107<H>  3.9   |  28  |  0.81    Ca    9.2      06 Feb 2018 07:29  Mg     2.0     02-06                                CAPILLARY BLOOD GLUCOSE                Allergies:  penicillin (Rash)    MEDICATIONS  (STANDING):  apixaban 5 milliGRAM(s) Oral every 12 hours  atorvastatin 10 milliGRAM(s) Oral at bedtime  diltiazem    Tablet 60 milliGRAM(s) Oral every 6 hours  furosemide   Injectable 20 milliGRAM(s) IV Push two times a day  valsartan 160 milliGRAM(s) Oral daily    MEDICATIONS  (PRN):        DIAGNOSTIC TESTS:   < from: Xray Chest 1 View- PORTABLE-Urgent (02.06.18 @ 09:13) >  FINDINGS: Portable view of the chest is compared to/5/2018 and   demonstrates interval resolution of previous demonstrated pulmonary   edema. Interval decrease in now small residual layering bilateral pleural   effusions with bibasilar atelectasis. Midline trachea. Cardiomegaly. No   narciso consolidation.    < end of copied text > CARDIOLOGY NP PROGRESS NOTE    Subjective: Patient seen and examined at bedside.   Patient states feeling well without SOB, CP, palpitations and dizziness.  Remainder ROS otherwise negative.    Overnight Events:  Patient weaned off NC and maintaining SpO2 above 95% RA.     TELEMETRY: AFib (70s-80s)    VITAL SIGNS:  T(C): 36.1 (02-06-18 @ 11:01), Max: 37.1 (02-05-18 @ 18:25)  HR: 71 (02-06-18 @ 06:14) (70 - 102)  BP: 156/72 (02-06-18 @ 05:03) (106/81 - 156/72)  RR: 18 (02-06-18 @ 06:14) (17 - 18)  SpO2: 91% (02-06-18 @ 06:14) (91% - 95%)  Wt(kg): --    I&O's Summary    05 Feb 2018 07:01  -  06 Feb 2018 07:00  --------------------------------------------------------  IN: 1297 mL / OUT: 2550 mL / NET: -1253 mL    06 Feb 2018 07:01  -  06 Feb 2018 12:40  --------------------------------------------------------  IN: 820 mL / OUT: 1025 mL / NET: -205 mL          PHYSICAL EXAM:    General: No acute Distress  Neck: Supple, NO JVD  Cardiac: S1 S2, No M/R/G  Pulmonary: Diminished breath sounds. Breathing unlabored on RA. No Wheezing.   Abdomen: Soft, Non -tender, +BS x 4 quads  Extremities: +2 BL pedal->LE edema. No Rashes.   Neuro: A/o x 3, No focal deficits          LABS:                          14.4   7.4   )-----------( 171      ( 06 Feb 2018 07:29 )             44.4                              02-06    135  |  96  |  22  ----------------------------<  107<H>  3.9   |  28  |  0.81    Ca    9.2      06 Feb 2018 07:29  Mg     2.0     02-06                                CAPILLARY BLOOD GLUCOSE                Allergies:  penicillin (Rash)    MEDICATIONS  (STANDING):  apixaban 5 milliGRAM(s) Oral every 12 hours  atorvastatin 10 milliGRAM(s) Oral at bedtime  diltiazem    Tablet 60 milliGRAM(s) Oral every 6 hours  furosemide   Injectable 20 milliGRAM(s) IV Push two times a day  valsartan 160 milliGRAM(s) Oral daily    MEDICATIONS  (PRN):        DIAGNOSTIC TESTS:   < from: Xray Chest 1 View- PORTABLE-Urgent (02.06.18 @ 09:13) >  FINDINGS: Portable view of the chest is compared to/5/2018 and   demonstrates interval resolution of previous demonstrated pulmonary   edema. Interval decrease in now small residual layering bilateral pleural   effusions with bibasilar atelectasis. Midline trachea. Cardiomegaly. No   narciso consolidation.    < end of copied text >

## 2018-02-06 NOTE — PROGRESS NOTE ADULT - PROBLEM SELECTOR PLAN 3
- C/w home valsartan 160 daily  - Holding home atenolol and consider restarting as BP tolerates   - PO Cardizem 60 mg Q6h restarted     # ASCVD 37% if adjust age for 79 years  - Patient started on Lipitor 10 mg q HS - C/w home valsartan 160 daily  - Holding home atenolol and consider restarting as BP tolerates   - Switch Cardizem 60 mg q6h  (home dose 180mg qd was held in setting of acute CHF exacerbation) to Cardizem 120 mg CD q12H    # ASCVD 37% if adjust age for 79 years  - C/W Lipitor 10 mg q HS - C/w home valsartan 160 daily  - Holding home atenolol, consider restarting if needed for BP control   - Switch Cardizem 60 mg q6h  (home dose 180mg qd was held in setting of acute CHF exacerbation) to Cardizem 120 mg CD q12H    # ASCVD 37% if adjust age for 79 years  - C/W Lipitor 10 mg q HS

## 2018-02-06 NOTE — PROGRESS NOTE ADULT - PROBLEM SELECTOR PLAN 2
Patients [id number]= 5  - Continue with home anticoagulation with Eliquis 5mg q 12  - Rate controlled at home with diltiazem, as per daughter was on metoprolol however patient became altered on this medication.  Restarted PO Cardizem 60 mg Q6h (home dose 180mg qd was held in setting of acute CHF exacerbation)  - EP Dr. Hardwick consulted for possible YAMEL/Cardioversion as patient HR increases to 130s on exertion, will f/u recs Patients [id number]= 5  - Continue with home anticoagulation with Eliquis 5mg q 12  - Rate controlled at home with diltiazem, as per daughter was on metoprolol however patient became altered on this medication.   - Switch Cardizem 60 mg q6h  (home dose 180mg qd was held in setting of acute CHF exacerbation) to Cardizem 120 mg CD q12H  - EP Dr. Hardwick consulted, patient to follow up as outpatient for possible CT and cardioversion Patients [id number]= 5. Currently rate controlled HR 70-90s  - Continue with home anticoagulation with Eliquis 5mg q 12  - Rate controlled at home with diltiazem, as per daughter was on metoprolol however patient became very fatigued on this medication.   - Switch Cardizem 60 mg q6h  (home dose 180mg qd was held in setting of acute CHF exacerbation) to Cardizem 120 mg CD q12H per EP recs  - EP Dr. Hardwick consulted, patient to follow up as outpatient for possible CT Heart r/o atrial clot and cardioversion.

## 2018-02-06 NOTE — PROGRESS NOTE ADULT - PROBLEM SELECTOR PLAN 1
Pt admitted for acute on chronic exacerbation of CHF possibly 2/2 medications (not optimized on current home regimen) vs. tachyarrhythmias vs. dietary noncompliance.   - C/w diuresis with lasix 20mg IV BID, net neg 2.2 L over 24 hours   - Patient received extra dose IV lasix 20 mg this AM   - Attempt to wean off O2 as congestion improves (pt not on home O2)  - Restarted PO Cardizem 60 mg Q6h (home dose 180mg qd was held in setting of acute CHF exacerbation)   - Coreg discontinued as Cardizem was resumed   - C/w home valsartan 160mg qday, will attempt to uptitrate as BP tolerates  - Encourage use of IS to prevent atelectasis   - Strict I/O's, daily weights   - ECHO results as above. EF 50-55%, mod dilated La, severe dilated RA, small 5mm mobile echodensity to aortic side of L coronary cusp, Differential includes Lambl's excresence Vs small fibroelastoma. Plan to medically optimize patient for CHF exacerbation at this time, without plan for YAMEL given patient already anticoagulated with Eliquis Pt admitted for acute on chronic exacerbation of CHF possibly 2/2 medications (not optimized on current home regimen) vs. tachyarrhythmias vs. dietary noncompliance.   - C/w diuresis with lasix 20mg IV BID, net neg 1.25 L over 24 hours. CXR 2/6 demonstrates interval resolution of previous demonstrated pulmonary   edema. Interval decrease in now small residual layering bilateral pleural   effusions with bibasilar atelectasis. No narciso consolidation  - Plan to transition patient to PO Lasix 2/7 with goal net neg 1 L over 24 hours   - Weaned off O2 (pt not on home O2)  - C/W Cardizem 60 mg Q6h (home dose 180mg qd was held in setting of acute CHF exacerbation)   - Coreg discontinued as Cardizem was resumed   - C/w home valsartan 160mg qday, will attempt to uptitrate as BP tolerates  - Encourage use of IS to prevent atelectasis   - Strict I/O's, daily weights   - ECHO results as above. EF 50-55%, mod dilated La, severe dilated RA, small 5mm mobile echodensity to aortic side of L coronary cusp, Differential includes Lambl's excresence Vs small fibroelastoma. Plan to medically optimize patient for CHF exacerbation at this time, without plan for YAMEL given patient already anticoagulated with Eliquis Pt admitted for acute on chronic exacerbation of CHF possibly 2/2 medications (not optimized on current home regimen) vs. tachyarrhythmias vs. dietary noncompliance.   - C/w diuresis with lasix 20mg IV BID, net neg 1.25 L over 24 hours. CXR 2/6 demonstrates interval resolution of previous demonstrated pulmonary   edema. Interval decrease in now small residual layering bilateral pleural   effusions with bibasilar atelectasis. No narciso consolidation  - Plan to transition patient to PO Lasix 2/7 with goal net neg 1 L over 24 hours   - Weaned off O2 (pt not on home O2)  - Switch Cardizem 60 mg q6h  (home dose 180mg qd was held in setting of acute CHF exacerbation) to Cardizem 120 mg CD q12H  - Coreg discontinued as Cardizem was resumed   - C/w home valsartan 160mg qday, will attempt to uptitrate as BP tolerates  - Encourage use of IS to prevent atelectasis   - Strict I/O's, daily weights   - ECHO results as above. EF 50-55%, mod dilated La, severe dilated RA, small 5mm mobile echodensity to aortic side of L coronary cusp, Differential includes Lambl's excresence Vs small fibroelastoma. Plan to medically optimize patient for CHF exacerbation at this time, without plan for YAMEL given patient already anticoagulated with Eliquis Pt admitted for acute on chronic diastolic CHF exacerbation possibly 2/2 medications (not optimized on current home regimen) vs. tachyarrhythmias vs. dietary noncompliance.   - C/w diuresis with lasix 20mg IV BID, net neg 1.25 L over 24 hours. CXR 2/6 demonstrates interval resolution of previous demonstrated pulmonary   edema. Interval decrease in now small residual layering bilateral pleural   effusions with bibasilar atelectasis. No narciso consolidation  - Plan to transition patient to PO Lasix 2/7 with goal net neg 1 L over 24 hours   - Weaned off O2 (pt not on home O2)  - Switch Cardizem 60 mg q6h  (home dose 180mg qd was held in setting of acute CHF exacerbation) to Cardizem 120 mg CD q12H per EP recs  - Coreg discontinued as Cardizem was resumed   - C/w home valsartan 160mg qday, will uptitrate as needed for BP control  - Encourage use of IS to prevent atelectasis   - Strict I/O's, daily weights   - ECHO performed w/ EF 50-55%, mod dilated La, severe dilated RA, small 5mm mobile echodensity to aortic side of L coronary cusp, Differential includes Lambl's excresence Vs small fibroelastoma. Plan to medically optimize patient for CHF exacerbation at this time, without plan for YAMEL given patient already anticoagulated with Eliquis

## 2018-02-06 NOTE — CONSULT NOTE ADULT - SUBJECTIVE AND OBJECTIVE BOX
89 year old woman with history of afib on eliquis, CHF unknown EF, HTN recently admitted to hospital in [place] for pneumonia found to be in rapid afib 3 months ago now controlled with PO diltiazem, who presents for worsening SOB, worsening LE edema. Patient states since her admission 3 months ago she has been SOB with exertion and has had worsening lower extremity edema.  States she sees a cardiologist in [place] however as per daughter does not always go as frequently as she should.  Patient takes lasix 20, valsartan 160, atenolol 50, and ditalizem 180 for her cardiac issues. Patient denies chest pain, SOB at rest, cough, fevers, chills.  Patient states she sleeps with 2 pillows at night however is able to lay flat.  Patient states she is SOB walking in her apartment room to room.  ED Course: In the ED patient T 98.1, HR 88 /94 R 18 S 94. Patient given 20 IV Lasix x 2. EKG showed t-wave inversions in I, AVL, V5 and V6. Trops negative.  Chest x-ray showed bilateral effusions. CT head because of dizziness, showed no acute intracranial hemorrhage or acute transcortical infarct.  Showed chronic bilateral lacunar infarcts.     MY ASSESSMENT TODAY  Today feels "ready to go home"  Diuresing  Sitting in a chair - daughter present  Records from CT requested  Tolerating meds     MEDICATIONS:  diltiazem    Tablet 60 milliGRAM(s) Oral every 6 hours  furosemide   Injectable 20 milliGRAM(s) IV Push two times a day  valsartan 160 milliGRAM(s) Oral daily  atorvastatin 10 milliGRAM(s) Oral at bedtime  apixaban 5 milliGRAM(s) Oral every 12 hours    [PHYSICAL EXAM:  TELEMETRY:  T(C): 36.6 (02-06-18 @ 05:59), Max: 37.1 (02-05-18 @ 18:25)  HR: 71 (02-06-18 @ 06:14) (70 - 102)  BP: 156/72 (02-06-18 @ 05:03) (106/81 - 156/72)  RR: 18 (02-06-18 @ 06:14) (17 - 18)  SpO2: 91% (02-06-18 @ 06:14) (91% - 95%)  Wt(kg): --  I&O's Summary    05 Feb 2018 07:01  -  06 Feb 2018 07:00  --------------------------------------------------------  IN: 1297 mL / OUT: 2550 mL / NET: -1253 mL    06 Feb 2018 07:01  -  06 Feb 2018 10:38  --------------------------------------------------------  IN: 300 mL / OUT: 625 mL / NET: -325 mL        [place]:  Central/PICC/Mid Line:                                         Appearance: Normal	  HEENT:   Normal oral mucosa, PERRL, EOMI	  Neck: Supple, + JVD/ - JVD; Carotid Bruit   Cardiovascular: Normal S1 S2, No JVD, No murmurs,   Respiratory: Lungs clear to auscultation/Decreased Breath Sounds/No Rales, Rhonchi, Wheezing	  Gastrointestinal:  Soft, Non-tender, + BS	  Skin: No rashes, No ecchymoses, No cyanosis  Extremities: Normal range of motion, No clubbing, cyanosis or edema  Vascular: Peripheral pulses palpable 2+ bilaterally  Neurologic: Non-focal  Psychiatry: A & O x 3, Mood & affect appropriate      	    ECG:  	  RADIOLOGY:   DIAGNOSTIC TESTING:  [ ] Echocardiogram:  [ ]  Catheterization:  [ ] Stress Test:    [ ] YAMEL  OTHER: 	    LABS           14.4   7.4   )-----------( 171      ( 06 Feb 2018 07:29 )             44.4     02-06    135  |  96  |  22  ----------------------------<  107<H>  3.9   |  28  |  0.81    Ca    9.2      06 Feb 2018 07:29  Mg     2.0     02-06      proBNP:   Lipid Profile:   HgA1c:   TSH:       ASSESSMENT/PLAN: 	    89 year old woman with history of afib on eliquis, CHF unknown EF, HTN recently admitted to hospital [place] for pneumonia found to be in rapid afib 3 months ago now controlled with PO diltiazem, who presents for worsening SOB, worsening LE edema. Patient states since her admission 3 months ago she has been SOB with exertion and has had worsening lower extremity edema.  States she sees a cardiologist in [place] however as per daughter does not always go as frequently as she should.  Patient takes lasix 20, valsartan 160, atenolol 50, and ditalizem 180 for her cardiac issues. Patient denies chest pain, SOB at rest, cough, fevers, chills.  Patient states she sleeps with 2 pillows at night however is able to lay flat.  Patient states she is SOB walking in her apartment room to room.  ED Course: In the ED patient T 98.1, HR 88 /94 R 18 S 94. Patient given 20 IV Lasix x 2. EKG showed t-wave inversions in I, AVL, V5 and V6. Trops negative.  Chest x-ray showed bilateral effusions. CT head because of dizziness, showed no acute intracranial hemorrhage or acute transcortical infarct.  Showed chronic bilateral lacunar infarcts.     Recommendations:    OOB - has passed PT evaluation for direct discharge home  Transition to PO Lasix  - avoid overdiuresis    Better rate controlled on Diltiazem  - transition to PO Diltiazem  mg BID    Continue AC - Eliquis    FU as out patient  - will reevaluate current strategy of rate control and anticoagulation / may need to be cardioverted (LA dimension 4.5 cm, preserved LV function    Discussed with [facility] team / patient / daughter
CHIEF COMPLAINT:    HISTORY OF PRESENT ILLNESS:    89 year old woman with history of afib on eliquis, CHF unknown EF, HTN recently admitted to hospital in [place] for pneumonia found to be in rapid afib 3 months ago now controlled with PO diltiazem, who presents for worsening SOB, worsening LE edema. Patient states since her admission 3 months ago she has been SOB with exertion and has had worsening lower extremity edema.  States she sees a cardiologist in [place] however as per daughter does not always go as frequently as she should.  Patient takes lasix 20, valsartan 160, atenolol 50, and ditalizem 180 for her cardiac issues. Patient denies chest pain, SOB at rest, cough, fevers, chills.  Patient states she sleeps with 2 pillows at night however is able to lay flat.  Patient states she is SOB walking in her apartment room to room.  ED Course: In the ED patient T 98.1, HR 88 /94 R 18 S 94. Patient given 20 IV Lasix x 2. EKG showed t-wave inversions in I, AVL, V5 and V6. Trops negative.  Chest x-ray showed bilateral effusions. CT head because of dizziness, showed no acute intracranial hemorrhage or acute transcortical infarct.  Showed chronic bilateral lacunar infarcts.     MY ASSESSMENT:  Today feels better  - Less SOB  - no palpitations  - LE edema has improved      PAST MEDICAL & SURGICAL HISTORY:  CHF (congestive heart failure)  Atrial fibrillation  HTN (hypertension)  No significant past surgical history        PERTINENT DIAGNOSTIC TESTING:    [ ] Echocardiogram:      Allergies    penicillin (Rash)    Intolerances    	    MEDICATIONS:  diltiazem    Tablet 60 milliGRAM(s) Oral every 6 hours  furosemide   Injectable 20 milliGRAM(s) IV Push two times a day  valsartan 160 milliGRAM(s) Oral daily  atorvastatin 10 milliGRAM(s) Oral at bedtime  apixaban 5 milliGRAM(s) Oral every 12 hours      FAMILY HISTORY:  No pertinent family history in first degree relatives      SOCIAL HISTORY:    [ ] Non-smoker  [ ] Smoker  [ ] Alcohol        REVIEW OF SYSTEMS:    CONSTITUTIONAL: No fever, weight loss, or fatigue  EYES: No eye pain, visual disturbances, or discharge  ENMT:  No difficulty hearing, tinnitus, vertigo; No sinus or throat pain  NECK: No pain or stiffness  BREASTS: No pain, masses, or nipple discharge  RESPIRATORY: No cough, wheezing, chills or hemoptysis; No Shortness of Breath  CARDIOVASCULAR: No chest pain, palpitations, dizziness, or leg swelling  GASTROINTESTINAL: No abdominal or epigastric pain. No nausea, vomiting, or hematemesis; No diarrhea or constipation. No melena or hematochezia.  GENITOURINARY: No dysuria, frequency, hematuria, or incontinence  NEUROLOGICAL: No headaches, memory loss, loss of strength, numbness, or tremors  SKIN: No itching, burning, rashes, or lesions   LYMPH Nodes: No enlarged glands  ENDOCRINE: No heat or cold intolerance; No hair loss  MUSCULOSKELETAL: No joint pain or swelling; No muscle, back, or extremity pain  PSYCHIATRIC: No depression, anxiety, mood swings, or difficulty sleeping  HEME/LYMPH: No easy bruising, or bleeding gums  ALLERY AND IMMUNOLOGIC: No hives or eczema	    [ ] All others negative	  [ ] Unable to obtain    PHYSICAL EXAM:  T(C): 37.1 (02-05-18 @ 18:25), Max: 37.1 (02-05-18 @ 18:25)  HR: 87 (02-05-18 @ 20:42) (80 - 106)  BP: 132/60 (02-05-18 @ 20:14) (132/60 - 159/77)  RR: 18 (02-05-18 @ 20:42) (16 - 18)  SpO2: 94% (02-05-18 @ 20:42) (94% - 98%)  Wt(kg): --  I&O's Summary    04 Feb 2018 07:01 - 05 Feb 2018 07:00  --------------------------------------------------------  IN: 1080 mL / OUT: 3290 mL / NET: -2210 mL    05 Feb 2018 07:01  -  05 Feb 2018 23:29  --------------------------------------------------------  IN: 1297 mL / OUT: 2150 mL / NET: -853 mL        TELEMETRY: 	    ECG:     Appearance: Normal	  HEENT:   Normal oral mucosa, PERRL, EOMI	  Cardiovascular: Normal S1 S2, No JVD, No murmurs, No edema  Respiratory: Lungs clear to auscultation	  Gastrointestinal:  Soft, Non-tender, + BS	  Neurologic: A&O x 3, Non-focal  Extremities: Normal range of motion, No clubbing, cyanosis or edema  Vascular: Peripheral pulses palpable 2+ bilaterally    	  LABS:	 	    CARDIAC MARKERS:                          13.7   7.4   )-----------( 173      ( 05 Feb 2018 06:44 )             42.0     02-05    136  |  96  |  20  ----------------------------<  102<H>  3.9   |  27  |  0.75    Ca    9.2      05 Feb 2018 06:45  Mg     1.9     02-05      proBNP:   Lipid Profile:   HgA1c:   TSH:     ASSESSMENT/PLAN: 	    89 year old woman with history of afib on eliquis, CHF unknown EF, HTN recently admitted to hospital [place] for pneumonia found to be in rapid afib 3 months ago now controlled with PO diltiazem, who presents for worsening SOB and worsening LE edema.      Problem/Plan - 1:  ·  Problem: CHF (congestive heart failure).  Plan: Acute on chronic exacerbation of CHF, patient with JVD to the mandible, crackles at bases.  Chest x-ray congested.  Patient with 1+ edema of the lower extremities.  - Given 20 of IV Lasix x 2 in the ED  - Monitor UOP and dose Lasix based on fluid status  - Patient normotensive patient took home valsartan in the AM, will dose for home dose at 2/4 6:00 AM.      Problem/Plan - 2:  ·  Problem: Atrial fibrillation.  Plan: - Patients [id number]= 5, c/w home anticoagulation with Eliquis 5mg q 12  - rate controlled at home with diltiazem, as per daughter was on metoprolol however patient became altered on this medication.  Given acute heart failure exacerbation holding home diltiazem and dosed metoprolol 12.5mg x 1.  Will continue to monitor heart rate. Will address restarting atenolol in the AM.      Problem/Plan - 3:  ·  Problem: HTN (hypertension).  Plan: Patient normotensive took home valsartan in the AM will dose home dose at 2/4 6:00 AM  c/w home valsartan 160 daily  holding home atenolol.       MY RECOMMENDATIONS:  AF with high thromboembolic risk  - HTN, stroke, age, Female  [id number] = 6  Diastolic CHF  Recent PNA    Recommend:  Rx diastolic CHF  Optimize rate control with Diltiazem  AC with Eliquis  CTA to r/o NARGIS thrombus pre cardioversion  Schedule cardioversion as an outpatient after she has been optimized    FU in office  Discussed with Dr. Marshall and the patient's daughter  FU scheduled

## 2018-02-06 NOTE — PROGRESS NOTE ADULT - ASSESSMENT
89yo F w/ PMHx of afib on eliquis, CHF unknown EF, HTN recently admitted [place] hospital [place] pneumonia found to be in rapid afib 3 months ago now controlled with PO diltiazem, who presents for worsening SOB and LE edema, admitted to tele  for acute on chronic CHF exacerbation. 89yo F w/ PMHx of afib on eliquis, CHF unknown EF, HTN recently admitted to hospital [place] for pneumonia found to be in rapid afib 3 months ago now controlled with PO diltiazem, who presents for worsening SOB and LE edema, admitted to tele for acute on chronic CHF exacerbation. 89yo F w/ PMHx of afib on eliquis, diastolic CHF (EF50-55% on echo), HTN recently admitted to hospital [place] for pneumonia found to be in rapid afib 3 months ago now controlled with PO diltiazem, who presents for worsening SOB and LE edema, admitted to tele for acute on chronic CHF exacerbation. 89yo F w/ PMHx of afib on eliquis, diastolic CHF (EF 50-55% on echo), HTN recently admitted to hospital in [place] for pneumonia found to be in rapid afib 3 months ago now controlled with PO diltiazem, who presents for worsening SOB and LE edema, admitted to tele for acute on chronic diastolic CHF exacerbation.

## 2018-02-07 VITALS — TEMPERATURE: 98 F

## 2018-02-07 PROBLEM — Z00.00 ENCOUNTER FOR PREVENTIVE HEALTH EXAMINATION: Status: ACTIVE | Noted: 2018-02-07

## 2018-02-07 PROBLEM — I50.9 HEART FAILURE, UNSPECIFIED: Chronic | Status: ACTIVE | Noted: 2018-02-03

## 2018-02-07 PROBLEM — I48.91 UNSPECIFIED ATRIAL FIBRILLATION: Chronic | Status: ACTIVE | Noted: 2018-02-03

## 2018-02-07 LAB
ANION GAP SERPL CALC-SCNC: 15 MMOL/L — SIGNIFICANT CHANGE UP (ref 5–17)
BUN SERPL-MCNC: 25 MG/DL — HIGH (ref 7–23)
CALCIUM SERPL-MCNC: 9.4 MG/DL — SIGNIFICANT CHANGE UP (ref 8.4–10.5)
CHLORIDE SERPL-SCNC: 93 MMOL/L — LOW (ref 96–108)
CO2 SERPL-SCNC: 26 MMOL/L — SIGNIFICANT CHANGE UP (ref 22–31)
CREAT SERPL-MCNC: 0.86 MG/DL — SIGNIFICANT CHANGE UP (ref 0.5–1.3)
GLUCOSE SERPL-MCNC: 110 MG/DL — HIGH (ref 70–99)
HCT VFR BLD CALC: 43.8 % — SIGNIFICANT CHANGE UP (ref 34.5–45)
HGB BLD-MCNC: 13.8 G/DL — SIGNIFICANT CHANGE UP (ref 11.5–15.5)
MAGNESIUM SERPL-MCNC: 2.2 MG/DL — SIGNIFICANT CHANGE UP (ref 1.6–2.6)
MCHC RBC-ENTMCNC: 30 PG — SIGNIFICANT CHANGE UP (ref 27–34)
MCHC RBC-ENTMCNC: 31.5 G/DL — LOW (ref 32–36)
MCV RBC AUTO: 95.2 FL — SIGNIFICANT CHANGE UP (ref 80–100)
PLATELET # BLD AUTO: 183 K/UL — SIGNIFICANT CHANGE UP (ref 150–400)
POTASSIUM SERPL-MCNC: 4 MMOL/L — SIGNIFICANT CHANGE UP (ref 3.5–5.3)
POTASSIUM SERPL-SCNC: 4 MMOL/L — SIGNIFICANT CHANGE UP (ref 3.5–5.3)
RBC # BLD: 4.6 M/UL — SIGNIFICANT CHANGE UP (ref 3.8–5.2)
RBC # FLD: 13.3 % — SIGNIFICANT CHANGE UP (ref 10.3–16.9)
SODIUM SERPL-SCNC: 134 MMOL/L — LOW (ref 135–145)
WBC # BLD: 6.9 K/UL — SIGNIFICANT CHANGE UP (ref 3.8–10.5)
WBC # FLD AUTO: 6.9 K/UL — SIGNIFICANT CHANGE UP (ref 3.8–10.5)

## 2018-02-07 PROCEDURE — 84484 ASSAY OF TROPONIN QUANT: CPT

## 2018-02-07 PROCEDURE — 97161 PT EVAL LOW COMPLEX 20 MIN: CPT

## 2018-02-07 PROCEDURE — 86901 BLOOD TYPING SEROLOGIC RH(D): CPT

## 2018-02-07 PROCEDURE — 86900 BLOOD TYPING SEROLOGIC ABO: CPT

## 2018-02-07 PROCEDURE — 85027 COMPLETE CBC AUTOMATED: CPT

## 2018-02-07 PROCEDURE — 99285 EMERGENCY DEPT VISIT HI MDM: CPT | Mod: 25

## 2018-02-07 PROCEDURE — 93306 TTE W/DOPPLER COMPLETE: CPT

## 2018-02-07 PROCEDURE — 82550 ASSAY OF CK (CPK): CPT

## 2018-02-07 PROCEDURE — 93005 ELECTROCARDIOGRAM TRACING: CPT | Mod: 76

## 2018-02-07 PROCEDURE — 70450 CT HEAD/BRAIN W/O DYE: CPT

## 2018-02-07 PROCEDURE — 71045 X-RAY EXAM CHEST 1 VIEW: CPT

## 2018-02-07 PROCEDURE — 83880 ASSAY OF NATRIURETIC PEPTIDE: CPT

## 2018-02-07 PROCEDURE — 85025 COMPLETE CBC W/AUTO DIFF WBC: CPT

## 2018-02-07 PROCEDURE — 36415 COLL VENOUS BLD VENIPUNCTURE: CPT

## 2018-02-07 PROCEDURE — 84443 ASSAY THYROID STIM HORMONE: CPT

## 2018-02-07 PROCEDURE — 80048 BASIC METABOLIC PNL TOTAL CA: CPT

## 2018-02-07 PROCEDURE — 83735 ASSAY OF MAGNESIUM: CPT

## 2018-02-07 PROCEDURE — 99238 HOSP IP/OBS DSCHRG MGMT 30/<: CPT

## 2018-02-07 PROCEDURE — 96376 TX/PRO/DX INJ SAME DRUG ADON: CPT

## 2018-02-07 PROCEDURE — 94660 CPAP INITIATION&MGMT: CPT

## 2018-02-07 PROCEDURE — 96374 THER/PROPH/DIAG INJ IV PUSH: CPT

## 2018-02-07 PROCEDURE — 80053 COMPREHEN METABOLIC PANEL: CPT

## 2018-02-07 PROCEDURE — 83036 HEMOGLOBIN GLYCOSYLATED A1C: CPT

## 2018-02-07 PROCEDURE — 80061 LIPID PANEL: CPT

## 2018-02-07 PROCEDURE — 86850 RBC ANTIBODY SCREEN: CPT

## 2018-02-07 PROCEDURE — 85730 THROMBOPLASTIN TIME PARTIAL: CPT

## 2018-02-07 PROCEDURE — 97116 GAIT TRAINING THERAPY: CPT

## 2018-02-07 PROCEDURE — 82553 CREATINE MB FRACTION: CPT

## 2018-02-07 PROCEDURE — 85610 PROTHROMBIN TIME: CPT

## 2018-02-07 RX ORDER — VALSARTAN 80 MG/1
1 TABLET ORAL
Qty: 0 | Refills: 0 | COMMUNITY
Start: 2018-02-07

## 2018-02-07 RX ORDER — ATORVASTATIN CALCIUM 80 MG/1
1 TABLET, FILM COATED ORAL
Qty: 0 | Refills: 0 | COMMUNITY
Start: 2018-02-07

## 2018-02-07 RX ORDER — APIXABAN 2.5 MG/1
0 TABLET, FILM COATED ORAL
Qty: 0 | Refills: 0 | COMMUNITY

## 2018-02-07 RX ORDER — DILTIAZEM HCL 120 MG
1 CAPSULE, EXT RELEASE 24 HR ORAL
Qty: 60 | Refills: 0 | OUTPATIENT
Start: 2018-02-07 | End: 2018-03-08

## 2018-02-07 RX ORDER — ATENOLOL 25 MG/1
50 TABLET ORAL
Qty: 0 | Refills: 0 | COMMUNITY

## 2018-02-07 RX ORDER — VALSARTAN 80 MG/1
160 TABLET ORAL
Qty: 0 | Refills: 0 | COMMUNITY

## 2018-02-07 RX ORDER — FUROSEMIDE 40 MG
20 TABLET ORAL DAILY
Qty: 0 | Refills: 0 | Status: DISCONTINUED | OUTPATIENT
Start: 2018-02-08 | End: 2018-02-07

## 2018-02-07 RX ORDER — FUROSEMIDE 40 MG
0 TABLET ORAL
Qty: 0 | Refills: 0 | COMMUNITY

## 2018-02-07 RX ORDER — DILTIAZEM HCL 120 MG
0 CAPSULE, EXT RELEASE 24 HR ORAL
Qty: 0 | Refills: 0 | COMMUNITY

## 2018-02-07 RX ORDER — ATORVASTATIN CALCIUM 80 MG/1
1 TABLET, FILM COATED ORAL
Qty: 30 | Refills: 0 | OUTPATIENT
Start: 2018-02-07 | End: 2018-03-08

## 2018-02-07 RX ADMIN — Medication 20 MILLIGRAM(S): at 05:58

## 2018-02-07 RX ADMIN — VALSARTAN 160 MILLIGRAM(S): 80 TABLET ORAL at 05:58

## 2018-02-07 RX ADMIN — Medication 120 MILLIGRAM(S): at 05:58

## 2018-02-07 RX ADMIN — APIXABAN 5 MILLIGRAM(S): 2.5 TABLET, FILM COATED ORAL at 05:58

## 2018-02-09 DIAGNOSIS — I11.0 HYPERTENSIVE HEART DISEASE WITH HEART FAILURE: ICD-10-CM

## 2018-02-09 DIAGNOSIS — I50.33 ACUTE ON CHRONIC DIASTOLIC (CONGESTIVE) HEART FAILURE: ICD-10-CM

## 2018-02-09 DIAGNOSIS — Z87.01 PERSONAL HISTORY OF PNEUMONIA (RECURRENT): ICD-10-CM

## 2018-02-09 DIAGNOSIS — Z86.73 PERSONAL HISTORY OF TRANSIENT ISCHEMIC ATTACK (TIA), AND CEREBRAL INFARCTION WITHOUT RESIDUAL DEFICITS: ICD-10-CM

## 2018-02-09 DIAGNOSIS — Z88.0 ALLERGY STATUS TO PENICILLIN: ICD-10-CM

## 2018-02-09 DIAGNOSIS — Z79.01 LONG TERM (CURRENT) USE OF ANTICOAGULANTS: ICD-10-CM

## 2018-02-09 DIAGNOSIS — R06.02 SHORTNESS OF BREATH: ICD-10-CM

## 2018-02-09 DIAGNOSIS — I48.0 PAROXYSMAL ATRIAL FIBRILLATION: ICD-10-CM

## 2018-02-09 DIAGNOSIS — I35.8 OTHER NONRHEUMATIC AORTIC VALVE DISORDERS: ICD-10-CM

## 2018-02-14 ENCOUNTER — APPOINTMENT (OUTPATIENT)
Dept: HEART AND VASCULAR | Facility: CLINIC | Age: 83
End: 2018-02-14
Payer: MEDICARE

## 2018-02-14 VITALS
DIASTOLIC BLOOD PRESSURE: 74 MMHG | SYSTOLIC BLOOD PRESSURE: 140 MMHG | OXYGEN SATURATION: 95 % | BODY MASS INDEX: 32.25 KG/M2 | HEART RATE: 85 BPM | TEMPERATURE: 98.4 F | WEIGHT: 181.99 LBS | HEIGHT: 62.99 IN

## 2018-02-14 PROCEDURE — 99214 OFFICE O/P EST MOD 30 MIN: CPT | Mod: 25

## 2018-02-14 PROCEDURE — 93000 ELECTROCARDIOGRAM COMPLETE: CPT

## 2018-03-28 ENCOUNTER — APPOINTMENT (OUTPATIENT)
Dept: HEART AND VASCULAR | Facility: CLINIC | Age: 83
End: 2018-03-28
Payer: MEDICARE

## 2018-03-28 VITALS
WEIGHT: 182 LBS | HEIGHT: 62.99 IN | HEART RATE: 104 BPM | SYSTOLIC BLOOD PRESSURE: 162 MMHG | TEMPERATURE: 98.4 F | BODY MASS INDEX: 32.25 KG/M2 | OXYGEN SATURATION: 97 % | DIASTOLIC BLOOD PRESSURE: 80 MMHG

## 2018-03-28 PROCEDURE — 93000 ELECTROCARDIOGRAM COMPLETE: CPT

## 2018-03-28 PROCEDURE — 99214 OFFICE O/P EST MOD 30 MIN: CPT | Mod: 25

## 2018-04-07 ENCOUNTER — INPATIENT (INPATIENT)
Facility: HOSPITAL | Age: 83
LOS: 3 days | Discharge: HOME | DRG: 682 | End: 2018-04-11
Attending: INTERNAL MEDICINE | Admitting: INTERNAL MEDICINE
Payer: MEDICARE

## 2018-04-07 VITALS
HEART RATE: 106 BPM | WEIGHT: 182.1 LBS | SYSTOLIC BLOOD PRESSURE: 161 MMHG | HEIGHT: 64 IN | DIASTOLIC BLOOD PRESSURE: 77 MMHG | TEMPERATURE: 98 F | RESPIRATION RATE: 20 BRPM | OXYGEN SATURATION: 96 %

## 2018-04-07 DIAGNOSIS — N17.9 ACUTE KIDNEY FAILURE, UNSPECIFIED: ICD-10-CM

## 2018-04-07 DIAGNOSIS — Z98.890 OTHER SPECIFIED POSTPROCEDURAL STATES: Chronic | ICD-10-CM

## 2018-04-07 DIAGNOSIS — Z29.9 ENCOUNTER FOR PROPHYLACTIC MEASURES, UNSPECIFIED: ICD-10-CM

## 2018-04-07 DIAGNOSIS — R63.8 OTHER SYMPTOMS AND SIGNS CONCERNING FOOD AND FLUID INTAKE: ICD-10-CM

## 2018-04-07 DIAGNOSIS — I50.9 HEART FAILURE, UNSPECIFIED: ICD-10-CM

## 2018-04-07 DIAGNOSIS — I48.91 UNSPECIFIED ATRIAL FIBRILLATION: ICD-10-CM

## 2018-04-07 DIAGNOSIS — I10 ESSENTIAL (PRIMARY) HYPERTENSION: ICD-10-CM

## 2018-04-07 DIAGNOSIS — E44.0 MODERATE PROTEIN-CALORIE MALNUTRITION: ICD-10-CM

## 2018-04-07 LAB
ALBUMIN SERPL ELPH-MCNC: 2.6 G/DL — LOW (ref 3.3–5)
ALP SERPL-CCNC: 103 U/L — SIGNIFICANT CHANGE UP (ref 40–120)
ALT FLD-CCNC: 12 U/L — SIGNIFICANT CHANGE UP (ref 10–45)
ANION GAP SERPL CALC-SCNC: 11 MMOL/L — SIGNIFICANT CHANGE UP (ref 5–17)
APTT BLD: 38.7 SEC — HIGH (ref 27.5–37.4)
AST SERPL-CCNC: 28 U/L — SIGNIFICANT CHANGE UP (ref 10–40)
BASOPHILS NFR BLD AUTO: 0.5 % — SIGNIFICANT CHANGE UP (ref 0–2)
BILIRUB SERPL-MCNC: 0.4 MG/DL — SIGNIFICANT CHANGE UP (ref 0.2–1.2)
BUN SERPL-MCNC: 32 MG/DL — HIGH (ref 7–23)
CALCIUM SERPL-MCNC: 9.2 MG/DL — SIGNIFICANT CHANGE UP (ref 8.4–10.5)
CHLORIDE SERPL-SCNC: 98 MMOL/L — SIGNIFICANT CHANGE UP (ref 96–108)
CK MB CFR SERPL CALC: 3.9 NG/ML — SIGNIFICANT CHANGE UP (ref 0–6.7)
CK MB CFR SERPL CALC: 4 NG/ML — SIGNIFICANT CHANGE UP (ref 0–6.7)
CK SERPL-CCNC: 94 U/L — SIGNIFICANT CHANGE UP (ref 25–170)
CO2 SERPL-SCNC: 27 MMOL/L — SIGNIFICANT CHANGE UP (ref 22–31)
CREAT ?TM UR-MCNC: 24 MG/DL — SIGNIFICANT CHANGE UP
CREAT SERPL-MCNC: 1.84 MG/DL — HIGH (ref 0.5–1.3)
EOSINOPHIL NFR BLD AUTO: 1.3 % — SIGNIFICANT CHANGE UP (ref 0–6)
GLUCOSE SERPL-MCNC: 102 MG/DL — HIGH (ref 70–99)
HCT VFR BLD CALC: 43.6 % — SIGNIFICANT CHANGE UP (ref 34.5–45)
HGB BLD-MCNC: 14.3 G/DL — SIGNIFICANT CHANGE UP (ref 11.5–15.5)
INR BLD: 1.34 — HIGH (ref 0.88–1.16)
LYMPHOCYTES # BLD AUTO: 32.4 % — SIGNIFICANT CHANGE UP (ref 13–44)
MCHC RBC-ENTMCNC: 30.8 PG — SIGNIFICANT CHANGE UP (ref 27–34)
MCHC RBC-ENTMCNC: 32.8 G/DL — SIGNIFICANT CHANGE UP (ref 32–36)
MCV RBC AUTO: 94 FL — SIGNIFICANT CHANGE UP (ref 80–100)
MONOCYTES NFR BLD AUTO: 13.5 % — SIGNIFICANT CHANGE UP (ref 2–14)
NEUTROPHILS NFR BLD AUTO: 52.3 % — SIGNIFICANT CHANGE UP (ref 43–77)
NT-PROBNP SERPL-SCNC: 2272 PG/ML — HIGH (ref 0–300)
PLATELET # BLD AUTO: 210 K/UL — SIGNIFICANT CHANGE UP (ref 150–400)
POTASSIUM SERPL-MCNC: 4.3 MMOL/L — SIGNIFICANT CHANGE UP (ref 3.5–5.3)
POTASSIUM SERPL-SCNC: 4.3 MMOL/L — SIGNIFICANT CHANGE UP (ref 3.5–5.3)
PROT SERPL-MCNC: 7 G/DL — SIGNIFICANT CHANGE UP (ref 6–8.3)
PROTHROM AB SERPL-ACNC: 15 SEC — HIGH (ref 9.8–12.7)
RBC # BLD: 4.64 M/UL — SIGNIFICANT CHANGE UP (ref 3.8–5.2)
RBC # FLD: 14.1 % — SIGNIFICANT CHANGE UP (ref 10.3–16.9)
SODIUM SERPL-SCNC: 136 MMOL/L — SIGNIFICANT CHANGE UP (ref 135–145)
TROPONIN T SERPL-MCNC: 0.02 NG/ML — HIGH (ref 0–0.01)
TROPONIN T SERPL-MCNC: <0.01 NG/ML — SIGNIFICANT CHANGE UP (ref 0–0.01)
UUN UR-MCNC: 198 MG/DL — SIGNIFICANT CHANGE UP
WBC # BLD: 7.5 K/UL — SIGNIFICANT CHANGE UP (ref 3.8–10.5)
WBC # FLD AUTO: 7.5 K/UL — SIGNIFICANT CHANGE UP (ref 3.8–10.5)

## 2018-04-07 PROCEDURE — 99285 EMERGENCY DEPT VISIT HI MDM: CPT | Mod: 25

## 2018-04-07 PROCEDURE — 93010 ELECTROCARDIOGRAM REPORT: CPT

## 2018-04-07 PROCEDURE — 71046 X-RAY EXAM CHEST 2 VIEWS: CPT | Mod: 26

## 2018-04-07 RX ORDER — ATORVASTATIN CALCIUM 80 MG/1
10 TABLET, FILM COATED ORAL AT BEDTIME
Qty: 0 | Refills: 0 | Status: DISCONTINUED | OUTPATIENT
Start: 2018-04-07 | End: 2018-04-11

## 2018-04-07 RX ORDER — FUROSEMIDE 40 MG
40 TABLET ORAL ONCE
Qty: 0 | Refills: 0 | Status: COMPLETED | OUTPATIENT
Start: 2018-04-07 | End: 2018-04-07

## 2018-04-07 RX ORDER — VALSARTAN 80 MG/1
160 TABLET ORAL DAILY
Qty: 0 | Refills: 0 | Status: DISCONTINUED | OUTPATIENT
Start: 2018-04-07 | End: 2018-04-08

## 2018-04-07 RX ORDER — APIXABAN 2.5 MG/1
2.5 TABLET, FILM COATED ORAL EVERY 12 HOURS
Qty: 0 | Refills: 0 | Status: DISCONTINUED | OUTPATIENT
Start: 2018-04-07 | End: 2018-04-11

## 2018-04-07 RX ORDER — FUROSEMIDE 40 MG
40 TABLET ORAL
Qty: 0 | Refills: 0 | Status: DISCONTINUED | OUTPATIENT
Start: 2018-04-07 | End: 2018-04-10

## 2018-04-07 RX ADMIN — APIXABAN 2.5 MILLIGRAM(S): 2.5 TABLET, FILM COATED ORAL at 17:31

## 2018-04-07 RX ADMIN — Medication 40 MILLIGRAM(S): at 18:31

## 2018-04-07 RX ADMIN — ATORVASTATIN CALCIUM 10 MILLIGRAM(S): 80 TABLET, FILM COATED ORAL at 21:09

## 2018-04-07 RX ADMIN — Medication 40 MILLIGRAM(S): at 14:03

## 2018-04-07 NOTE — H&P ADULT - HISTORY OF PRESENT ILLNESS
Patient is a 89 year old woman with history of afib on eliquis, CHFpEF, HTN, presenting with bilateral leg and feet swelling with sob when walking for 3 months got worse for 1week , also with facial and hand swelling for 3 days. 89 year old woman with history of afib on eliquis, CHF EF 50% with severe RA dilation and moderate LA dilation , HTN recently admitted to [facility] in February for afib with rapid ventricular response with chf exacerbation who presented to ED today with progressive weakness   hospital in [place] for pneumonia found to be in rapid afib 3 months ago now controlled with PO diltiazem, who presents for worsening LE edema, UE edema in the hands which has been progressively worsening since patient has been discharged. She was discharged on furosemide 20mg Qdaily. 89 year old woman with history of afib on eliquis, CHF EF 50% with severe RA dilation and moderate LA dilation , HTN recently admitted to [facility] in February for afib with rapid ventricular response with chf exacerbation who presented to ED today with progressive weakness   hospital [place] for pneumonia found to be in rapid afib 3 months ago now controlled with PO diltiazem, who presents for worsening LE edema, UE edema in the hands which has been progressively worsening since patient has been discharged. She was discharged on furosemide 20mg Qdaily and recently increased to 20mg BID by Dr. Kemp which according to her, "has not helped". She has not had orthopnea, has not had PND, has not had shortness of breath, chest pain, and has not noticed a decrease in her physical ability. +nausea, vomited once over the last few days.     Of note patient has had decrease appetite since discharge from [facility]. Diet consists of beans, rice      In the ED:  ICU Vital Signs Last 24 Hrs  T(C): 37.4 (07 Apr 2018 18:03), Max: 37.4 (07 Apr 2018 18:03)  T(F): 99.3 (07 Apr 2018 18:03), Max: 99.3 (07 Apr 2018 18:03)  HR: 86 (07 Apr 2018 17:24) (86 - 106)  BP: 140/66 (07 Apr 2018 17:24) (140/66 - 173/73)  BP(mean): 95 (07 Apr 2018 17:24) (95 - 116)  ABP: --  ABP(mean): --  RR: 18 (07 Apr 2018 17:24) (18 - 20)  SpO2: 95% (07 Apr 2018 17:24) (94% - 96%)    Received 40 IV lasix x1, Troponin negative x1, showed new ANNETTE and admitted to cardiac tele for CHF exacerbation with Bnp>2000 89 year old woman with history of afib on eliquis, CHF EF 50% with severe RA dilation and moderate LA dilation , HTN recently admitted to [facility] in February for afib with rapid ventricular response with chf exacerbation who presented to ED today with progressive weakness   hospital [place] for [place] found to be in rapid afib 3 months ago now controlled with PO diltiazem, who presents for worsening LE edema, UE edema in the hands which has been progressively worsening since patient has been discharged. She was discharged on furosemide 20mg Qdaily and recently increased to 20mg BID by Dr. Kemp which according to her, "has not helped". She has not had orthopnea, has not had PND, has not had shortness of breath, chest pain, and has not noticed a decrease in her physical ability. +nausea, vomited once over the last few days.     Of note patient has had decrease appetite since discharge from [facility]. Diet consists of beans, rice      In the ED:  ICU Vital Signs Last 24 Hrs  T(C): 37.4 (07 Apr 2018 18:03), Max: 37.4 (07 Apr 2018 18:03)  T(F): 99.3 (07 Apr 2018 18:03), Max: 99.3 (07 Apr 2018 18:03)  HR: 86 (07 Apr 2018 17:24) (86 - 106)  BP: 140/66 (07 Apr 2018 17:24) (140/66 - 173/73)  BP(mean): 95 (07 Apr 2018 17:24) (95 - 116)  ABP: --  ABP(mean): --  RR: 18 (07 Apr 2018 17:24) (18 - 20)  SpO2: 95% (07 Apr 2018 17:24) (94% - 96%)    Received 40 IV lasix x1, Troponin negative x1, showed new ANNETTE and admitted to cardiac tele for CHF exacerbation with Bnp>2000 with EKG showing Atrial fibrillation with left axis deviation and LVH by modified andreea criteria. CXR with bilateral pleural effusions.

## 2018-04-07 NOTE — ED ADULT TRIAGE NOTE - CHIEF COMPLAINT QUOTE
Patient c/o bilateral leg and feet swelling with sob when walking  for 3 months got worse for 1week , also with facial and hand swelling for 3 days . On lasix 20 mg BID . History of CHF .

## 2018-04-07 NOTE — H&P ADULT - NSHPPHYSICALEXAM_GEN_ALL_CORE
Vital Signs Last 12 Hrs  T(F): 97.8 (04-07-18 @ 15:13), Max: 97.8 (04-07-18 @ 15:13)  HR: 104 (04-07-18 @ 15:50) (91 - 106)  BP: 154/87 (04-07-18 @ 15:50) (154/87 - 173/73)  BP(mean): 116 (04-07-18 @ 15:50) (116 - 116)  RR: 18 (04-07-18 @ 15:50) (18 - 20)  SpO2: 94% (04-07-18 @ 15:50) (94% - 96%)    PHYSICAL EXAM:  Constitutional: NAD, comfortable in bed.  HEENT: NC/AT, PERRLA, EOMI, no conjunctival pallor or scleral icterus, MMM  Neck: Supple, no JVD  Respiratory: Normal rate, rhythm, depth, effort. CTAB. No w/r/r.   Cardiovascular: RRR, normal S1 and S2, no m/r/g.   Gastrointestinal: +BS, soft NTND, no guarding or rebound tenderness, no palpable masses   Extremities: wwp; no cyanosis, clubbing or edema.   Vascular: Pulses equal and strong throughout.   Neurological: AAOx3, no CN deficits, strength and sensation intact throughout.   Skin: No gross skin abnormalities or rashes .  VITAL SIGNS:  T(C): 37.4 (04-07-18 @ 18:03), Max: 37.4 (04-07-18 @ 18:03)  T(F): 99.3 (04-07-18 @ 18:03), Max: 99.3 (04-07-18 @ 18:03)  HR: 86 (04-07-18 @ 17:24) (86 - 106)  BP: 140/66 (04-07-18 @ 17:24) (140/66 - 173/73)  BP(mean): 95 (04-07-18 @ 17:24) (95 - 116)  RR: 18 (04-07-18 @ 17:24) (18 - 20)  SpO2: 95% (04-07-18 @ 17:24) (94% - 96%)  Wt(kg): --    PHYSICAL EXAM:    Constitutional: WDWN resting comfortably in bed; NAD  Head: NC/AT  Eyes: PERRL, EOMI, anicteric sclera  ENT: no nasal discharge; uvula midline, no oropharyngeal erythema or exudates; MMM  Neck: mild JVD  Respiratory: +fine crackles bilateral bases without egophony.   Cardiac: +S1/S2; RRR; no +3/6 systolic murmur   Gastrointestinal: soft, NT/ND; no rebound or guarding; +BSx4  Genitourinary: normal external genitalia  Back: spine midline, no bony tenderness or step-offs; no CVAT B/L  Extremities: WWP, no clubbing or cyanosis; +3 pitting edema extending up to   Musculoskeletal: NROM x4; no joint swelling, tenderness or erythema  Vascular: 2+ radial, femoral, DP/PT pulses B/L  Dermatologic: Skin warm, +right knee midline incisional scar, well healed.   Lymphatic: no submandibular or cervical LAD  Neurologic: AAOx3; CNII-XII grossly intact; no focal deficits  Psychiatric: affect and characteristics of appearance, verbalizations, behaviors are appropriate

## 2018-04-07 NOTE — H&P ADULT - PROBLEM SELECTOR PLAN 1
Acute on chronic CHF Acute on chronic CHF exacerbation with component of inefficient furosemide due to ANNETTE. Also suspicion for component for dietary non-compliance  -s/p lasix 40IV in ER  -lasix 40 BID Acute on chronic CHF exacerbation with component of inefficient furosemide due to ANNETTE. Also suspicion for component for dietary non-compliance  -s/p lasix 40IV in ER  -lasix 40 BID IVP  -Monitiring urine output with urine collection "hat" in bathroom. Acute on chronic CHF exacerbation with component of inefficient furosemide due to ANNETTE. Also suspicion for component for dietary non-compliance  -s/p lasix 40IV in ER  -lasix 40 BID IVP  -Monitiring urine output with urine collection "hat" in bathroom.  -CXR in AM  -f/u repeat tropes, no evidence of cardiac ischemia on History and physical  -f/u repeat ekg, echo, lipid panel, tsh, a1c.

## 2018-04-07 NOTE — ED PROVIDER NOTE - MEDICAL DECISION MAKING DETAILS
here w/ acute on chronic CHF exacerbation already increased her usual lasix dose from once daily to bid with no improvement, and family notes no increased urine output. will give IV lasix here and d/w her cardiologist and admit

## 2018-04-07 NOTE — ED PROVIDER NOTE - PHYSICAL EXAMINATION
CONSTITUTIONAL: Well-appearing; well-nourished; in no apparent distress.   HEAD: Normocephalic; atraumatic.   EYES:  conjunctiva and sclera clear  ENT: normal nose; no rhinorrhea; normal pharynx with no erythema or lesions.   NECK: Supple; non-tender;   CARDIOVASCULAR: Normal S1, S2; no murmurs, rubs, or gallops. Regular rate and rhythm.   RESPIRATORY: Breathing easily; breath sounds clear and equal bilaterally; no wheezes, rhonchi, or rales.  GI: Soft; non-distended; non-tender; no palpable organomegaly.   EXT: No cyanosis, +pitting edema bilaterally up to knees, warm well perfused  SKIN: Normal for age and race; warm; dry; good turgor; no apparent lesions or rash.   NEURO: A & O x 3; face symmetric; grossly unremarkable.   PSYCHOLOGICAL: The patient’s mood and manner are appropriate.

## 2018-04-07 NOTE — H&P ADULT - NSHPLABSRESULTS_GEN_ALL_CORE
LABS:             14.3   7.5   )-----------( 210      ( 07 Apr 2018 14:00 )             43.6     04-07    136  |  98  |  32<H>  ----------------------------<  102<H>  4.3   |  27  |  1.84<H>    Ca    9.2      07 Apr 2018 14:00  TPro  7.0  /  Alb  2.6<L>  /  TBili  0.4  /  DBili  x   /  AST  28  /  ALT  12  /  AlkPhos  103  04-07  PT/INR - ( 07 Apr 2018 14:00 )   PT: 15.0 sec;   INR: 1.34     PTT - ( 07 Apr 2018 14:00 )  PTT:38.7 sec    RADIOLOGY & ADDITIONAL TESTS:    MEDICATIONS  (STANDING):  apixaban 2.5 milliGRAM(s) Oral every 12 hours  atorvastatin 10 milliGRAM(s) Oral at bedtime  furosemide   Injectable 40 milliGRAM(s) IV Push two times a day  valsartan 160 milliGRAM(s) Oral daily    MEDICATIONS  (PRN): .  LABS:                         14.3   7.5   )-----------( 210      ( 07 Apr 2018 14:00 )             43.6     04-07    136  |  98  |  32<H>  ----------------------------<  102<H>  4.3   |  27  |  1.84<H>    Ca    9.2      07 Apr 2018 14:00    TPro  7.0  /  Alb  2.6<L>  /  TBili  0.4  /  DBili  x   /  AST  28  /  ALT  12  /  AlkPhos  103  04-07    PT/INR - ( 07 Apr 2018 14:00 )   PT: 15.0 sec;   INR: 1.34          PTT - ( 07 Apr 2018 14:00 )  PTT:38.7 sec    CARDIAC MARKERS ( 07 Apr 2018 14:00 )  x     / <0.01 ng/mL / 97 U/L / x     / 3.9 ng/mL      Serum Pro-Brain Natriuretic Peptide: 2272 pg/mL (04-07 @ 14:00)        RADIOLOGY, EKG & ADDITIONAL TESTS: Reviewed.

## 2018-04-07 NOTE — H&P ADULT - PROBLEM SELECTOR PLAN 3
Patient with history of HTN.   - Patient with history of Afib.  -AC: Reduced dose of Eliquis to 2.5mg BID based on age and creatinine.   -Rate control: Holding cardizem currently. Patient appears well compensated, currently rate controlled; consider redosing in AM as patient took this morning.

## 2018-04-07 NOTE — H&P ADULT - PROBLEM SELECTOR PLAN 4
Patient with history of HTN.   - On cardizem daily, and  valsartan, which she has taken both today.   -Dosed valsartan for tomorrow.

## 2018-04-07 NOTE — H&P ADULT - PROBLEM SELECTOR PLAN 6
F: no iVF  E: replete prn  N: dash diet. 1000 cc fluid restriction.  Full Code: Patient;s daughters are HCP. Full Code for now.

## 2018-04-07 NOTE — H&P ADULT - ATTENDING COMMENTS
Covering for Dr. Kemp.  Pt seen this am. 89 yo HFpEF, chronic AF on Eliquis, HTN p/w ADHF c/b ANNETTE. Pt s/p diuresis o/n. AVN agents held. Noted to be tachycardic on exam today.  Plan:  - D/c valsartan in setting of ANNETTE which is likely 2/2 current heart failure state. Cont aggressive diuresis with Lasix 40 mg IV bid for goal net neg 1.5-2L.  - Can safely resume Cardizem 120 mg twice daily for better heart rate control.   - For TTE in am.  - PT, SW and dietary eval in am.

## 2018-04-08 ENCOUNTER — TRANSCRIPTION ENCOUNTER (OUTPATIENT)
Age: 83
End: 2018-04-08

## 2018-04-08 LAB
ALBUMIN SERPL ELPH-MCNC: 2.4 G/DL — LOW (ref 3.3–5)
ALP SERPL-CCNC: 80 U/L — SIGNIFICANT CHANGE UP (ref 40–120)
ALT FLD-CCNC: 10 U/L — SIGNIFICANT CHANGE UP (ref 10–45)
ANION GAP SERPL CALC-SCNC: 10 MMOL/L — SIGNIFICANT CHANGE UP (ref 5–17)
AST SERPL-CCNC: 23 U/L — SIGNIFICANT CHANGE UP (ref 10–40)
BILIRUB SERPL-MCNC: 0.4 MG/DL — SIGNIFICANT CHANGE UP (ref 0.2–1.2)
BUN SERPL-MCNC: 34 MG/DL — HIGH (ref 7–23)
CALCIUM SERPL-MCNC: 8.6 MG/DL — SIGNIFICANT CHANGE UP (ref 8.4–10.5)
CHLORIDE SERPL-SCNC: 101 MMOL/L — SIGNIFICANT CHANGE UP (ref 96–108)
CHOLEST SERPL-MCNC: 134 MG/DL — SIGNIFICANT CHANGE UP (ref 10–199)
CO2 SERPL-SCNC: 29 MMOL/L — SIGNIFICANT CHANGE UP (ref 22–31)
CREAT SERPL-MCNC: 1.74 MG/DL — HIGH (ref 0.5–1.3)
GLUCOSE SERPL-MCNC: 100 MG/DL — HIGH (ref 70–99)
HBA1C BLD-MCNC: 6.1 % — HIGH (ref 4–5.6)
HCT VFR BLD CALC: 40 % — SIGNIFICANT CHANGE UP (ref 34.5–45)
HDLC SERPL-MCNC: 56 MG/DL — SIGNIFICANT CHANGE UP (ref 40–125)
HGB BLD-MCNC: 12.9 G/DL — SIGNIFICANT CHANGE UP (ref 11.5–15.5)
LIPID PNL WITH DIRECT LDL SERPL: 60 MG/DL — SIGNIFICANT CHANGE UP
MAGNESIUM SERPL-MCNC: 1.9 MG/DL — SIGNIFICANT CHANGE UP (ref 1.6–2.6)
MCHC RBC-ENTMCNC: 30.2 PG — SIGNIFICANT CHANGE UP (ref 27–34)
MCHC RBC-ENTMCNC: 32.3 G/DL — SIGNIFICANT CHANGE UP (ref 32–36)
MCV RBC AUTO: 93.7 FL — SIGNIFICANT CHANGE UP (ref 80–100)
PLATELET # BLD AUTO: 184 K/UL — SIGNIFICANT CHANGE UP (ref 150–400)
POTASSIUM SERPL-MCNC: 3.6 MMOL/L — SIGNIFICANT CHANGE UP (ref 3.5–5.3)
POTASSIUM SERPL-SCNC: 3.6 MMOL/L — SIGNIFICANT CHANGE UP (ref 3.5–5.3)
PROT SERPL-MCNC: 5.8 G/DL — LOW (ref 6–8.3)
RBC # BLD: 4.27 M/UL — SIGNIFICANT CHANGE UP (ref 3.8–5.2)
RBC # FLD: 13.9 % — SIGNIFICANT CHANGE UP (ref 10.3–16.9)
SODIUM SERPL-SCNC: 140 MMOL/L — SIGNIFICANT CHANGE UP (ref 135–145)
TOTAL CHOLESTEROL/HDL RATIO MEASUREMENT: 2.4 RATIO — LOW (ref 3.3–7.1)
TRIGL SERPL-MCNC: 88 MG/DL — SIGNIFICANT CHANGE UP (ref 10–149)
TROPONIN T SERPL-MCNC: 0.01 NG/ML — SIGNIFICANT CHANGE UP (ref 0–0.01)
TSH SERPL-MCNC: 1.37 UIU/ML — SIGNIFICANT CHANGE UP (ref 0.35–4.94)
WBC # BLD: 7.4 K/UL — SIGNIFICANT CHANGE UP (ref 3.8–10.5)
WBC # FLD AUTO: 7.4 K/UL — SIGNIFICANT CHANGE UP (ref 3.8–10.5)

## 2018-04-08 PROCEDURE — 93306 TTE W/DOPPLER COMPLETE: CPT | Mod: 26

## 2018-04-08 PROCEDURE — 93010 ELECTROCARDIOGRAM REPORT: CPT

## 2018-04-08 PROCEDURE — 99233 SBSQ HOSP IP/OBS HIGH 50: CPT

## 2018-04-08 PROCEDURE — 71045 X-RAY EXAM CHEST 1 VIEW: CPT | Mod: 26

## 2018-04-08 RX ORDER — ACETAMINOPHEN 500 MG
650 TABLET ORAL ONCE
Qty: 0 | Refills: 0 | Status: COMPLETED | OUTPATIENT
Start: 2018-04-08 | End: 2018-04-08

## 2018-04-08 RX ORDER — DILTIAZEM HCL 120 MG
120 CAPSULE, EXT RELEASE 24 HR ORAL
Qty: 0 | Refills: 0 | Status: DISCONTINUED | OUTPATIENT
Start: 2018-04-08 | End: 2018-04-09

## 2018-04-08 RX ORDER — POTASSIUM CHLORIDE 20 MEQ
40 PACKET (EA) ORAL ONCE
Qty: 0 | Refills: 0 | Status: COMPLETED | OUTPATIENT
Start: 2018-04-08 | End: 2018-04-08

## 2018-04-08 RX ADMIN — APIXABAN 2.5 MILLIGRAM(S): 2.5 TABLET, FILM COATED ORAL at 07:15

## 2018-04-08 RX ADMIN — ATORVASTATIN CALCIUM 10 MILLIGRAM(S): 80 TABLET, FILM COATED ORAL at 21:06

## 2018-04-08 RX ADMIN — Medication 40 MILLIGRAM(S): at 18:50

## 2018-04-08 RX ADMIN — Medication 120 MILLIGRAM(S): at 21:06

## 2018-04-08 RX ADMIN — VALSARTAN 160 MILLIGRAM(S): 80 TABLET ORAL at 07:15

## 2018-04-08 RX ADMIN — Medication 650 MILLIGRAM(S): at 22:44

## 2018-04-08 RX ADMIN — Medication 120 MILLIGRAM(S): at 08:51

## 2018-04-08 RX ADMIN — Medication 650 MILLIGRAM(S): at 23:40

## 2018-04-08 RX ADMIN — APIXABAN 2.5 MILLIGRAM(S): 2.5 TABLET, FILM COATED ORAL at 18:50

## 2018-04-08 RX ADMIN — Medication 40 MILLIEQUIVALENT(S): at 07:15

## 2018-04-08 RX ADMIN — Medication 40 MILLIGRAM(S): at 07:15

## 2018-04-08 NOTE — DISCHARGE NOTE ADULT - CARE PROVIDERS DIRECT ADDRESSES
,[email or web address]

## 2018-04-08 NOTE — DISCHARGE NOTE ADULT - MEDICATION SUMMARY - MEDICATIONS TO TAKE
I will START or STAY ON the medications listed below when I get home from the hospital:    valsartan 160 mg oral tablet  -- 1 tab(s) by mouth once a day  -- Indication: For HTN (hypertension)    dilTIAZem 180 mg/24 hours oral capsule, extended release  -- 1 cap(s) by mouth 2 times a day   -- Indication: For Atrial fibrillation    apixaban 2.5 mg oral tablet  -- 1 tab(s) by mouth every 12 hours  -- Indication: For Atrial fibrillation    atorvastatin 10 mg oral tablet  -- 1 tab(s) by mouth once a day (at bedtime)  -- Indication: For Hyperlipidemia     furosemide 20 mg oral tablet  -- 1 tab(s) by mouth 2 times a day  -- Indication: For Heart Failure

## 2018-04-08 NOTE — DIETITIAN INITIAL EVALUATION ADULT. - ENERGY NEEDS
IBW used as pt is currently greater than 120% ideal body weight  Needs increased slightly secondary to predicted, unintentional weight loss d/t poor appetite  Energy: 1203-1412kcal (23-27cal/kg IBW)  Pro: 57.5-68g pro (1.1-1.3g pro/kg IBW)  Fluid per team secondary to CHF and fluid retention

## 2018-04-08 NOTE — DISCHARGE NOTE ADULT - CARE PLAN
Principal Discharge DX:	Acute on chronic diastolic (congestive) heart failure Principal Discharge DX:	Acute on chronic diastolic (congestive) heart failure  Goal:	Continue compliance with meds, diet, and CHF management. Follow up Cardiologist  Assessment and plan of treatment:	You were admitted to the hospital for heart failure exacerbation most likely due to dietary indiscretion. You received intravenous diuretics to help get rid of fluid in the body, which improved your symptoms. You will need to follow up with your cardiologist within 1 week of discharge. Principal Discharge DX:	Acute on chronic diastolic (congestive) heart failure  Goal:	Please follow up with Dr. Kemp on April 18th at 2PM  Assessment and plan of treatment:	You came into the hospital with increased swelling to your legs and feet. You were admitted for acute on chronic heart failure exacerbation most likely due to your dietary intake. You were given intravenous Lasix (in your IV) to get rid of the fluid in your legs. Please take Lasix  as prescribed without missing doses.  An echocardiogram or ultrasound of your heart was performed that was unchanged from previous admissions. Your ejection fraction or pumping function of the heart is  50-55%. Please maintain a low salt diet (less than 2grams per day) and drink no more than 1L of fluid each day. Weigh yourself daily and report any weight gain over 2 pounds/day to your Doctor. Please follow up with Dr. Kemp on April 18th at 2PM.  Secondary Diagnosis:	Atrial fibrillation  Assessment and plan of treatment:	You have a history of atrial fibrillation. Your heart rate was elevated while you were in the hospital. Per Dr. Hardwick, your Cardizem 120 mg daily was INCREASED to Cardizem 180 mg TWICE  DAY. With the increase in this medication, your heart rate has been stable. Please continue to take this medication as prescribed. To prevent clots from forming from the atrial fibrillation, you take Eliquis 5mg twice a day. Due to your age and worsening kidney function during the hospitalization, your dose was DECREASED to 2.5mg twice a day. Please continue to take this medication as prescribed and follow up with Dr. Hardwick within 1-2 weeks of discharge. Principal Discharge DX:	Acute on chronic diastolic (congestive) heart failure  Goal:	Please follow up with Dr. Kemp on April 18th at 2PM  Assessment and plan of treatment:	You came into the hospital with increased swelling to your legs and feet. You were admitted for acute on chronic heart failure exacerbation most likely due to your dietary intake. You were given intravenous Lasix (in your IV) to get rid of the fluid in your legs. Please take Lasix  as prescribed without missing doses.  An echocardiogram or ultrasound of your heart was performed that was unchanged from previous admissions. Your ejection fraction or pumping function of the heart is  50-55%. Please maintain a low salt diet (less than 2grams per day) and drink no more than 1L of fluid each day. Weigh yourself daily and report any weight gain over 2 pounds/day to your Doctor. Please follow up with Dr. Kemp on April 18th at 2PM.  Secondary Diagnosis:	Atrial fibrillation  Assessment and plan of treatment:	You have a history of atrial fibrillation. Your heart rate was elevated while you were in the hospital. Per Dr. Hardwick, your Cardizem 120 mg daily was INCREASED to Cardizem 180 mg TWICE A DAY. With the increase in this medication, your heart rate has been stable. Please continue to take this medication as prescribed. To prevent clots from forming from the atrial fibrillation, you take Eliquis 5mg twice a day. Due to your age and worsening kidney function during the hospitalization, your dose was DECREASED to 2.5mg twice a day. Please continue to take this medication as prescribed and follow up with Dr. Hardwick within 1-2 weeks of discharge.

## 2018-04-08 NOTE — DISCHARGE NOTE ADULT - HOSPITAL COURSE
89 year old woman with history of afib on eliquis, CHF EF 50% with severe RA dilation and moderate LA dilation, HTN recently admitted to [facility] in February for afib with RVR and CHF exacerbation presented to ED today with progressive weakness, worsening LE edema, and UE edema in the hands which has been progressively worsening since patient has been discharged. She was discharged on furosemide 20mg Qdaily and recently increased to 20mg BID by Dr. Kemp which according to her, "has not helped". Denies orthopnea, chest pain, weight gain, or PND. Reports having a decline in ET from walking "several minutes" to "just a little". +nausea, vomited once over the last few days. Of note patient has had decrease appetite since discharge from [facility]. Diet consists of rice and beans. 89 y/o F PMHx afib (on eliquis), HFpEF 50% and HTN recently admitted to [facility] in Feb for afib RVR and CHF exacerbation presented to ED on 4/7 with progressive weakness, worsening LE edema and UE edema in the hands since discharge. Patient d/c'd on Lasix 20mg QD, which has been recently increased to 20 BID by Dr. Kemp without significant improvement. Of note, patient has had decreased appetite since discharge and her diet consists of rice and beans. In ED, VS: T 99.3 HR 86 /66 RR 18 SpO2 95% Received Lasix 40mg IV. Troponin (- x 1 ) BNP > 2000 EKG Afib with left axis deviation and LVH CXR significant for bilateral pleural effusions and patient admitted to [facility] for acute on chronic diastolic heart failure exacerbation. During hospitalization, patient diuresed with Lasix 40 mg IV BID, which has now been transitioned to PO Lasix 20mg BID. Of note, troponin peaked 0.02 2/2 demand ischemia. ECHO performed with significance for LV hypertrophy, EF 50-55%, LA dilated, RA dilated, mild-mod MR, which is unchanged from ECHO in Feb 2018. On 4 chamber flow is noted near interatrial septum and cannot exclude septal defect, which patient will follow up with as outpatient.  During hospitalization, HR 140s while ambulating for which Cardizem increased from 120 to 180 mg BID per Dr. Hardwick and has remained below 110 since. Hospital course c/b ANNETTE on CKD. On admission BUN/crea 32/1.84, on discharge in february 25/0.86, suspicion for decreased PO intake as patient urinating throughout hospitalization and likely up trending 2/2 IV Lasix. Valsartan was held in the setting of ANNETTE and Eliquis was decreased from 5mg BID to 2.5 md BID given her age and creatinine. ANNETTE now improved as BUN/creat now downtrending 47/1.46, and patient medically stable to be discharged to home with home PT. Patient also seen by RD while in patient on dietary instructions and education for appropriate heart failure diet while at home. Patient being discharged on Lasix 20mg BID, Eliquis 2.5mg BID, Cardizem  mg BID and Valsartan 160mg daily. Patient to follow up with Dr. Kemp on April 18th at 2PM. 89 y/o F PMHx afib (on eliquis), HFpEF 50% and HTN recently admitted to [facility] in Feb for afib RVR and CHF exacerbation presented to ED on 4/7 with progressive weakness, worsening LE edema and UE edema in the hands since discharge. Patient d/c'd on Lasix 20mg QD, which has been recently increased to 20 BID by Dr. Kemp without significant improvement. Of note, patient has had decreased appetite since discharge and her diet consists of rice and beans. In ED, VS: T 99.3 HR 86 /66 RR 18 SpO2 95% Received Lasix 40mg IV. Troponin (- x 1 ), BNP > 2000, EKG Afib with left axis deviation and LVH, CXR significant for bilateral pleural effusions, and patient admitted to [facility] for acute on chronic diastolic heart failure exacerbation. During hospitalization, patient diuresed with Lasix 40 mg IV BID, which has now been transitioned to PO Lasix 20mg BID. Of note, troponin peaked 0.02 likely 2/2 demand ischemia. ECHO performed with significance for LV hypertrophy, EF 50-55%, LA dilated, RA dilated, mild-mod MR, which is unchanged from ECHO in Feb 2018. On 4 chamber flow is noted near interatrial septum and cannot exclude septal defect, which patient will follow up with as outpatient Echo w/ Bubble study.  During hospitalization, HR 140s while ambulating for which Cardizem increased from 120 to 180 mg BID per Dr. Hardwick and has remained below 110 since. Hospital course c/b ANNETTE on CKD. On admission BUN/crea 32/1.84, on discharge in february 25/0.86, suspicion for decreased PO intake as patient urinating throughout hospitalization and likely up trending 2/2 diuresis w/ IV Lasix. Valsartan was held in the setting of ANNETTE and Eliquis was decreased from 5mg BID to 2.5 mg BID given her age and creatinine. ANNETTE now improved as BUN/creat now downtrending 47/1.46, and patient medically stable to be discharged to home with home PT. Patient also seen by RD while in patient on dietary instructions and education for appropriate heart failure diet while at home. Patient being discharged on Lasix 20mg BID, Eliquis 2.5mg BID, Cardizem  mg BID and Valsartan 160mg daily. Patient to follow up with Dr. Kemp on April 18th at 2PM.

## 2018-04-08 NOTE — PHYSICAL THERAPY INITIAL EVALUATION ADULT - PERTINENT HX OF CURRENT PROBLEM, REHAB EVAL
89 year old female with wrosening appetite, fatigue and LE swelling with failure of lasix PO to facilitate urination found to be in CHF exacerbation associated with failure of lasix due to acute kidney injury 2/2 decreased PO intake with possibility of dietary non-compliance.

## 2018-04-08 NOTE — PROGRESS NOTE ADULT - PROBLEM SELECTOR PLAN 3
Patient with history of Afib. CHADsVASc: 5  -AC: Reduced dose of Eliquis to 2.5mg BID based on age and creatinine.   -Rate control: Restart home dose of Cardizem CD 120mg po Q12 as pt's HR on tele is not controlled, HR 120s-130s

## 2018-04-08 NOTE — DIETITIAN INITIAL EVALUATION ADULT. - PROBLEM SELECTOR PLAN 2
Suspicion for decreased PO intake. No history of bladder obstruction and patient has been urinating with IV lasix currently.   -Will send urine lytes for FeUrea (FeNa cannot be used 2/2 lasix usage).   -trend BMP

## 2018-04-08 NOTE — DISCHARGE NOTE ADULT - PLAN OF CARE
Continue compliance with meds, diet, and CHF management. Follow up Cardiologist You were admitted to the hospital for heart failure exacerbation most likely due to dietary indiscretion. You received intravenous diuretics to help get rid of fluid in the body, which improved your symptoms. You will need to follow up with your cardiologist within 1 week of discharge. Please follow up with Dr. Kemp on April 18th at 2PM You came into the hospital with increased swelling to your legs and feet. You were admitted for acute on chronic heart failure exacerbation most likely due to your dietary intake. You were given intravenous Lasix (in your IV) to get rid of the fluid in your legs. Please take Lasix  as prescribed without missing doses.  An echocardiogram or ultrasound of your heart was performed that was unchanged from previous admissions. Your ejection fraction or pumping function of the heart is  50-55%. Please maintain a low salt diet (less than 2grams per day) and drink no more than 1L of fluid each day. Weigh yourself daily and report any weight gain over 2 pounds/day to your Doctor. Please follow up with Dr. Kemp on April 18th at 2PM. You have a history of atrial fibrillation. Your heart rate was elevated while you were in the hospital. Per Dr. Hardwick, your Cardizem 120 mg daily was INCREASED to Cardizem 180 mg TWICE  DAY. With the increase in this medication, your heart rate has been stable. Please continue to take this medication as prescribed. To prevent clots from forming from the atrial fibrillation, you take Eliquis 5mg twice a day. Due to your age and worsening kidney function during the hospitalization, your dose was DECREASED to 2.5mg twice a day. Please continue to take this medication as prescribed and follow up with Dr. Hardwick within 1-2 weeks of discharge. You have a history of atrial fibrillation. Your heart rate was elevated while you were in the hospital. Per Dr. Hardwick, your Cardizem 120 mg daily was INCREASED to Cardizem 180 mg TWICE A DAY. With the increase in this medication, your heart rate has been stable. Please continue to take this medication as prescribed. To prevent clots from forming from the atrial fibrillation, you take Eliquis 5mg twice a day. Due to your age and worsening kidney function during the hospitalization, your dose was DECREASED to 2.5mg twice a day. Please continue to take this medication as prescribed and follow up with Dr. Hardwick within 1-2 weeks of discharge.

## 2018-04-08 NOTE — DISCHARGE NOTE ADULT - PATIENT PORTAL LINK FT
You can access the [facility], offered by [facility], by registering with the following website: [email or web address]

## 2018-04-08 NOTE — PROGRESS NOTE ADULT - ASSESSMENT
89 yo woman pmh of afib (on Eliquis), HFpEF (50%) w/ severe RA dilation and moderate LA dilation and HTN presents with worsening appetite, fatigue and LE swelling. Found to be in acute on chronic CHF exacerbation associated with failure of Lasix po due to acute kidney injury 2/2 decreased PO intake with possibility of dietary non-compliance.

## 2018-04-08 NOTE — DIETITIAN INITIAL EVALUATION ADULT. - PROBLEM SELECTOR PLAN 3
Patient with history of Afib.  -AC: Reduced dose of Eliquis to 2.5mg BID based on age and creatinine.   -Rate control: Holding cardizem currently. Patient appears well compensated, currently rate controlled; consider redosing in AM as patient took this morning.

## 2018-04-08 NOTE — PROGRESS NOTE ADULT - PROBLEM SELECTOR PLAN 4
Patient with history of HTN.   - On cardizem daily, and  valsartan, which she has taken both today.   -Hold Valsartan for now given rise in Cr

## 2018-04-08 NOTE — PROGRESS NOTE ADULT - PROBLEM SELECTOR PLAN 1
Acute on chronic CHF exacerbation with component of inefficient furosemide due to ANNETTE. Also suspicion for component for dietary non-compliance. BNP 2272. Pt is still volume overloaded on exam.   -Daily weights, strict I/Os, FR 1L/day. Daily BMP/Mg. Keep K>4 and Mag >2.   -s/p lasix 40 IV in ER  -continue Furosemide 40mg IV bid  -official TTE on Monday  -Hold Valsartan given ANNETTE

## 2018-04-08 NOTE — PROGRESS NOTE ADULT - SUBJECTIVE AND OBJECTIVE BOX
CARDIOLOGY NP PROGRESS NOTE    Subjective: Pt seen and examined this morning. Reports feeling tired and weak, which has been ongoing for the past 2-3 weeks. States that since discharge in February, she has noticed a decline in her exercise tolerance from walking "several minutes" to "just a little". Additionally, reports having leg swelling which hasn't improved since discharge in Feb. States that her cardiologist increased dose of Furosemide from 20mg daily to bid and notes no changes in the swelling of her legs. Denies weight gain, or PND. States increasing pillows from 1 to 2 over the past 2 months.     Overnight Events:     TELEMETRY: Afib rate 110s, around 8:30AM HR up to 120s-130s.   EKG: Afib rate 90s, LAD; TWI V5-V6 (unchanged from prior)       VITAL SIGNS:  T(C): 36.9 (04-08-18 @ 05:15), Max: 37.4 (04-07-18 @ 18:03)  HR: 106 (04-08-18 @ 08:54) (80 - 106)  BP: 132/70 (04-08-18 @ 08:54) (132/70 - 173/73)  RR: 16 (04-08-18 @ 08:54) (16 - 20)  SpO2: 93% (04-08-18 @ 08:54) (93% - 96%)  Wt(kg): --    I&O's Summary    07 Apr 2018 07:01  -  08 Apr 2018 07:00  --------------------------------------------------------  IN: 240 mL / OUT: 3000 mL / NET: -2760 mL    08 Apr 2018 07:01  -  08 Apr 2018 09:37  --------------------------------------------------------  IN: 0 mL / OUT: 200 mL / NET: -200 mL          PHYSICAL EXAM:    General: A/ox 3, No acute Distress  Neck: Supple, NO JVD  Cardiac: tachycardic, irregularly irregular, no M/R/G  Pulmonary: bibasilar rales noted, no wheezing or rhonchi   Abdomen: Soft, Non -tender, +BS x 4 quads  Extremities: No Rashes, 2+ pitting edema   Neuro: A/o x 3, No focal deficits      LABS:                          12.9   7.4   )-----------( 184      ( 08 Apr 2018 04:38 )             40.0                              04-08    140  |  101  |  34<H>  ----------------------------<  100<H>  3.6   |  29  |  1.74<H>    Ca    8.6      08 Apr 2018 04:37  Mg     1.9     04-08    TPro  5.8<L>  /  Alb  2.4<L>  /  TBili  0.4  /  DBili  x   /  AST  23  /  ALT  10  /  AlkPhos  80  04-08    LIVER FUNCTIONS - ( 08 Apr 2018 04:37 )  Alb: 2.4 g/dL / Pro: 5.8 g/dL / ALK PHOS: 80 U/L / ALT: 10 U/L / AST: 23 U/L / GGT: x                                 PT/INR - ( 07 Apr 2018 14:00 )   PT: 15.0 sec;   INR: 1.34          PTT - ( 07 Apr 2018 14:00 )  PTT:38.7 sec  CAPILLARY BLOOD GLUCOSE        CARDIAC MARKERS ( 08 Apr 2018 04:37 )  x     / 0.01 ng/mL / x     / x     / x      CARDIAC MARKERS ( 07 Apr 2018 22:17 )  x     / 0.02 ng/mL / 94 U/L / x     / 4.0 ng/mL  CARDIAC MARKERS ( 07 Apr 2018 14:00 )  x     / <0.01 ng/mL / 97 U/L / x     / 3.9 ng/mL            penicillin (Rash)    MEDICATIONS  (STANDING):  apixaban 2.5 milliGRAM(s) Oral every 12 hours  atorvastatin 10 milliGRAM(s) Oral at bedtime  diltiazem    milliGRAM(s) Oral <User Schedule>  furosemide   Injectable 40 milliGRAM(s) IV Push two times a day    MEDICATIONS  (PRN):        DIAGNOSTIC TESTS:

## 2018-04-08 NOTE — PROGRESS NOTE ADULT - PROBLEM SELECTOR PLAN 6
F: no iVF  E: replete prn  N: dash diet. 1000 cc fluid restriction.  Full Code: Patient;s daughters are HCP. Full Code for now. Discussed with pt and requesting to speak with daughter regarding Code status.   -PT/SW consult for discharge planning. Pt is requesting having help at home.

## 2018-04-08 NOTE — DISCHARGE NOTE ADULT - MEDICATION SUMMARY - MEDICATIONS TO CHANGE
I will SWITCH the dose or number of times a day I take the medications listed below when I get home from the hospital:    Cardizem  mg/24 hours oral capsule, extended release  -- 1 cap(s) by mouth 2 times a day    Eliquis 5 mg oral tablet  -- 1 tab(s) by mouth 2 times a day

## 2018-04-08 NOTE — PROGRESS NOTE ADULT - ATTENDING COMMENTS
Covering for Dr. Kemp.  Agree with NP assessment and plan above. In short, patient is a 89 yo dependent F who lives alone with close family support with h/o HFpEF, chronic AF, HTN p/w acute on chronic diastolic heart failure c/b AFIB w/ RVR.  VS reviewed and noted for tachycardia. Exam remarkable for b/l crackles, JVD, lower extremity edema, labs reviewed and noted for improving ANNETTE.  Plan:  #acute on chronic diastolic heart failure. Unclear exacerbation cause. Possible dietary indiscretion given lives alone. Patient is improving with current Lasix dose and will cont at this time with Lasix 40 mg IV twice daily for net neg goal 1.5-2L. F/u exam and CXR in am. Official TTE in am.  #AFIB. CCB held last night, as such patient tachycardic this am. Will resume home dose of Cardizem 120 mg twice daily. Cont Eliquis at lower dose in light of ANNETTE.  #ANNETTE- likely 2/2 current volume overload state. Creatinine improving with diuresis. Ensure monitoring STRICT I/Os, daily weights. Home ARB on hold.  - PT order, nutrition c/s and SW eval given need for likely more help at home. Would consider [facility] if patient qualifies.

## 2018-04-08 NOTE — DIETITIAN INITIAL EVALUATION ADULT. - OTHER INFO
Pt admitted with weakness, n/v and decreased appetite; CHF exacerbation associated with failure of lasix due to acute kidney injury 2/2 decreased PO intake with possibility of dietary non-compliance.  Per H&P, pt with decreased appetite since previous admission in February.  Per weight recall, weight has been relatively weight-stable, however, considering decreased appetite x2 months, pt likely with weight loss which is being displaced by fluid retention.  Will continue to monitor.  Pt endorses fair appetite at this time; consuming ~50% of meals.  Pt denies GI distress or pain.  NKFA.  Skin: intact.

## 2018-04-08 NOTE — PROGRESS NOTE ADULT - PROBLEM SELECTOR PLAN 2
Suspicion for decreased PO intake. No history of bladder obstruction and patient has been urinating with IV lasix currently.   -F/u urine lytes   -Daily BMP/Mg  -Hold Valsartan given rise in Cr  -Renally dose all medications  -Avoid nephrotoxic agents

## 2018-04-08 NOTE — DISCHARGE NOTE ADULT - CARE PROVIDER_API CALL
Ila Kemp), Internal Medicine  [address] [id number]  Phone: [phone]  Fax: [phone] Ila Kemp), Internal Medicine  [address] [id number]  Phone: [phone]  Fax: [phone]    Merlin Hardwick ([facility]), Cardiac Electrophysiology; Internal Medicine  [address] [place] [id number]  Phone: [phone]  Fax: [phone]

## 2018-04-08 NOTE — DIETITIAN INITIAL EVALUATION ADULT. - PROBLEM SELECTOR PLAN 1
Acute on chronic CHF exacerbation with component of inefficient furosemide due to ANNETTE. Also suspicion for component for dietary non-compliance  -s/p lasix 40IV in ER  -lasix 40 BID IVP  -Monitiring urine output with urine collection "hat" in bathroom.  -CXR in AM  -f/u repeat tropes, no evidence of cardiac ischemia on History and physical  -f/u repeat ekg, echo, lipid panel, tsh, a1c.

## 2018-04-09 DIAGNOSIS — I50.33 ACUTE ON CHRONIC DIASTOLIC (CONGESTIVE) HEART FAILURE: ICD-10-CM

## 2018-04-09 LAB
ANION GAP SERPL CALC-SCNC: 10 MMOL/L — SIGNIFICANT CHANGE UP (ref 5–17)
BUN SERPL-MCNC: 39 MG/DL — HIGH (ref 7–23)
CALCIUM SERPL-MCNC: 8.3 MG/DL — LOW (ref 8.4–10.5)
CHLORIDE SERPL-SCNC: 101 MMOL/L — SIGNIFICANT CHANGE UP (ref 96–108)
CO2 SERPL-SCNC: 27 MMOL/L — SIGNIFICANT CHANGE UP (ref 22–31)
CREAT SERPL-MCNC: 1.56 MG/DL — HIGH (ref 0.5–1.3)
GLUCOSE SERPL-MCNC: 99 MG/DL — SIGNIFICANT CHANGE UP (ref 70–99)
HCT VFR BLD CALC: 40.4 % — SIGNIFICANT CHANGE UP (ref 34.5–45)
HGB BLD-MCNC: 12.8 G/DL — SIGNIFICANT CHANGE UP (ref 11.5–15.5)
MAGNESIUM SERPL-MCNC: 1.9 MG/DL — SIGNIFICANT CHANGE UP (ref 1.6–2.6)
MCHC RBC-ENTMCNC: 30 PG — SIGNIFICANT CHANGE UP (ref 27–34)
MCHC RBC-ENTMCNC: 31.7 G/DL — LOW (ref 32–36)
MCV RBC AUTO: 94.8 FL — SIGNIFICANT CHANGE UP (ref 80–100)
PLATELET # BLD AUTO: 184 K/UL — SIGNIFICANT CHANGE UP (ref 150–400)
POTASSIUM SERPL-MCNC: 3.7 MMOL/L — SIGNIFICANT CHANGE UP (ref 3.5–5.3)
POTASSIUM SERPL-SCNC: 3.7 MMOL/L — SIGNIFICANT CHANGE UP (ref 3.5–5.3)
RBC # BLD: 4.26 M/UL — SIGNIFICANT CHANGE UP (ref 3.8–5.2)
RBC # FLD: 14.1 % — SIGNIFICANT CHANGE UP (ref 10.3–16.9)
SODIUM SERPL-SCNC: 138 MMOL/L — SIGNIFICANT CHANGE UP (ref 135–145)
WBC # BLD: 6.4 K/UL — SIGNIFICANT CHANGE UP (ref 3.8–10.5)
WBC # FLD AUTO: 6.4 K/UL — SIGNIFICANT CHANGE UP (ref 3.8–10.5)

## 2018-04-09 PROCEDURE — 99232 SBSQ HOSP IP/OBS MODERATE 35: CPT

## 2018-04-09 PROCEDURE — 93010 ELECTROCARDIOGRAM REPORT: CPT

## 2018-04-09 PROCEDURE — 71045 X-RAY EXAM CHEST 1 VIEW: CPT | Mod: 26

## 2018-04-09 RX ORDER — POTASSIUM CHLORIDE 20 MEQ
40 PACKET (EA) ORAL ONCE
Qty: 0 | Refills: 0 | Status: COMPLETED | OUTPATIENT
Start: 2018-04-09 | End: 2018-04-09

## 2018-04-09 RX ORDER — MAGNESIUM SULFATE 500 MG/ML
1 VIAL (ML) INJECTION ONCE
Qty: 0 | Refills: 0 | Status: COMPLETED | OUTPATIENT
Start: 2018-04-09 | End: 2018-04-09

## 2018-04-09 RX ORDER — DOCUSATE SODIUM 100 MG
100 CAPSULE ORAL THREE TIMES A DAY
Qty: 0 | Refills: 0 | Status: DISCONTINUED | OUTPATIENT
Start: 2018-04-09 | End: 2018-04-09

## 2018-04-09 RX ORDER — SENNA PLUS 8.6 MG/1
2 TABLET ORAL AT BEDTIME
Qty: 0 | Refills: 0 | Status: DISCONTINUED | OUTPATIENT
Start: 2018-04-09 | End: 2018-04-09

## 2018-04-09 RX ORDER — DILTIAZEM HCL 120 MG
180 CAPSULE, EXT RELEASE 24 HR ORAL
Qty: 0 | Refills: 0 | Status: DISCONTINUED | OUTPATIENT
Start: 2018-04-09 | End: 2018-04-11

## 2018-04-09 RX ORDER — POLYETHYLENE GLYCOL 3350 17 G/17G
17 POWDER, FOR SOLUTION ORAL DAILY
Qty: 0 | Refills: 0 | Status: DISCONTINUED | OUTPATIENT
Start: 2018-04-09 | End: 2018-04-11

## 2018-04-09 RX ADMIN — APIXABAN 2.5 MILLIGRAM(S): 2.5 TABLET, FILM COATED ORAL at 06:28

## 2018-04-09 RX ADMIN — Medication 40 MILLIGRAM(S): at 06:28

## 2018-04-09 RX ADMIN — Medication 120 MILLIGRAM(S): at 08:55

## 2018-04-09 RX ADMIN — Medication 40 MILLIEQUIVALENT(S): at 08:55

## 2018-04-09 RX ADMIN — Medication 5 MILLIGRAM(S): at 21:21

## 2018-04-09 RX ADMIN — Medication 180 MILLIGRAM(S): at 21:21

## 2018-04-09 RX ADMIN — APIXABAN 2.5 MILLIGRAM(S): 2.5 TABLET, FILM COATED ORAL at 18:25

## 2018-04-09 RX ADMIN — Medication 40 MILLIGRAM(S): at 18:26

## 2018-04-09 RX ADMIN — Medication 100 GRAM(S): at 08:54

## 2018-04-09 RX ADMIN — ATORVASTATIN CALCIUM 10 MILLIGRAM(S): 80 TABLET, FILM COATED ORAL at 21:21

## 2018-04-09 NOTE — PROGRESS NOTE ADULT - PROBLEM SELECTOR PLAN 3
Patient with history of Afib. CHADsVASc: 5.   - c/w Eliquis 2.5 mg BID (decreased 2/2 age and creatinine)   - c/w HD Cardizem 120 mg BID Patient with history of Afib. CHADsVASc: 5.   - c/w Eliquis 2.5 mg BID (decreased 2/2 age and creatinine)   - c/w HD Cardizem 120 mg BID  - Dr. Hardwick to see patient in hospital for history of Afib

## 2018-04-09 NOTE — PROGRESS NOTE ADULT - PROBLEM SELECTOR PLAN 5
Decreased PO intake recently. Albumin 2.6 from 4.1 on last admission.   - Nutritional consult, dietician following patient   - SW consulted for home health aid

## 2018-04-09 NOTE — PROGRESS NOTE ADULT - SUBJECTIVE AND OBJECTIVE BOX
CARDIOLOGY NP PROGRESS NOTE    Subjective:  Patient seen and examined at bedside. Patient states lower extremity edema improved. Denies SOB, dizziness/diaphoresis, n/v, palpitations.   Remainder ROS otherwise negative.    Overnight Events:  No acute events over night.     TELEMETRY: Afib 70s-90s     EKG: Afib @ 98 BPM, incomplete LBBB         VITAL SIGNS:  T(C): 37 (04-09-18 @ 08:37), Max: 37.2 (04-08-18 @ 18:50)  HR: 86 (04-09-18 @ 08:36) (76 - 110)  BP: 110/68 (04-09-18 @ 08:36) (110/68 - 156/85)  RR: 18 (04-09-18 @ 08:36) (16 - 20)  SpO2: 93% (04-09-18 @ 08:36) (92% - 98%)  Wt(kg): --    I&O's Summary    08 Apr 2018 07:01  -  09 Apr 2018 07:00  --------------------------------------------------------  IN: 837 mL / OUT: 1250 mL / NET: -413 mL    09 Apr 2018 07:01  -  09 Apr 2018 11:20  --------------------------------------------------------  IN: 320 mL / OUT: 1100 mL / NET: -780 mL          PHYSICAL EXAM:    General: A/ox 3, No acute Distress  Neck: Supple, NO JVD  Cardiac: S1 S2, No M/R/G  Pulmonary: Diminished bibasilar .Breathing unlabored.  Abdomen: Soft, Non -tender, +BS x 4 quads  Extremities: +2-+3 BL LE edema. No rashes noted   Neuro: A/o x 3, No focal deficits          LABS:                          12.8   6.4   )-----------( 184      ( 09 Apr 2018 06:30 )             40.4                              04-09    138  |  101  |  39<H>  ----------------------------<  99  3.7   |  27  |  1.56<H>    Ca    8.3<L>      09 Apr 2018 06:30  Mg     1.9     04-09    TPro  5.8<L>  /  Alb  2.4<L>  /  TBili  0.4  /  DBili  x   /  AST  23  /  ALT  10  /  AlkPhos  80  04-08    LIVER FUNCTIONS - ( 08 Apr 2018 04:37 )  Alb: 2.4 g/dL / Pro: 5.8 g/dL / ALK PHOS: 80 U/L / ALT: 10 U/L / AST: 23 U/L / GGT: x                                 PT/INR - ( 07 Apr 2018 14:00 )   PT: 15.0 sec;   INR: 1.34          PTT - ( 07 Apr 2018 14:00 )  PTT:38.7 sec  CAPILLARY BLOOD GLUCOSE        CARDIAC MARKERS ( 08 Apr 2018 04:37 )  x     / 0.01 ng/mL / x     / x     / x      CARDIAC MARKERS ( 07 Apr 2018 22:17 )  x     / 0.02 ng/mL / 94 U/L / x     / 4.0 ng/mL  CARDIAC MARKERS ( 07 Apr 2018 14:00 )  x     / <0.01 ng/mL / 97 U/L / x     / 3.9 ng/mL          Allergies:  metoprolol (Unknown)  penicillin (Rash)    MEDICATIONS  (STANDING):  apixaban 2.5 milliGRAM(s) Oral every 12 hours  atorvastatin 10 milliGRAM(s) Oral at bedtime  diltiazem    milliGRAM(s) Oral <User Schedule>  furosemide   Injectable 40 milliGRAM(s) IV Push two times a day    MEDICATIONS  (PRN): CARDIOLOGY NP PROGRESS NOTE    Subjective:  Patient seen and examined at bedside. Patient states lower extremity edema improved. Denies SOB, dizziness/diaphoresis, n/v, palpitations.   Remainder ROS otherwise negative.    Overnight Events:  No acute events over night.     TELEMETRY: Afib 70s-90s     EKG: Afib @ 98 BPM, incomplete LBBB         VITAL SIGNS:  T(C): 37 (04-09-18 @ 08:37), Max: 37.2 (04-08-18 @ 18:50)  HR: 86 (04-09-18 @ 08:36) (76 - 110)  BP: 110/68 (04-09-18 @ 08:36) (110/68 - 156/85)  RR: 18 (04-09-18 @ 08:36) (16 - 20)  SpO2: 93% (04-09-18 @ 08:36) (92% - 98%)  Wt(kg): --    I&O's Summary    08 Apr 2018 07:01  -  09 Apr 2018 07:00  --------------------------------------------------------  IN: 837 mL / OUT: 1250 mL / NET: -413 mL    09 Apr 2018 07:01  -  09 Apr 2018 11:20  --------------------------------------------------------  IN: 320 mL / OUT: 1100 mL / NET: -780 mL          PHYSICAL EXAM:    General: A/ox 3, No acute Distress  Neck: Supple, NO JVD  Cardiac: S1 S2, No M/R/G  Pulmonary: Diminished bibasilar .Breathing unlabored.  Abdomen: Soft, Non -tender, +BS x 4 quads  Extremities: +2-+3 BL LE edema. No rashes noted   Neuro: A/o x 3, No focal deficits          LABS:                          12.8   6.4   )-----------( 184      ( 09 Apr 2018 06:30 )             40.4                              04-09    138  |  101  |  39<H>  ----------------------------<  99  3.7   |  27  |  1.56<H>    Ca    8.3<L>      09 Apr 2018 06:30  Mg     1.9     04-09    TPro  5.8<L>  /  Alb  2.4<L>  /  TBili  0.4  /  DBili  x   /  AST  23  /  ALT  10  /  AlkPhos  80  04-08    LIVER FUNCTIONS - ( 08 Apr 2018 04:37 )  Alb: 2.4 g/dL / Pro: 5.8 g/dL / ALK PHOS: 80 U/L / ALT: 10 U/L / AST: 23 U/L / GGT: x                                 PT/INR - ( 07 Apr 2018 14:00 )   PT: 15.0 sec;   INR: 1.34          PTT - ( 07 Apr 2018 14:00 )  PTT:38.7 sec  CAPILLARY BLOOD GLUCOSE        CARDIAC MARKERS ( 08 Apr 2018 04:37 )  x     / 0.01 ng/mL / x     / x     / x      CARDIAC MARKERS ( 07 Apr 2018 22:17 )  x     / 0.02 ng/mL / 94 U/L / x     / 4.0 ng/mL  CARDIAC MARKERS ( 07 Apr 2018 14:00 )  x     / <0.01 ng/mL / 97 U/L / x     / 3.9 ng/mL          Allergies:  metoprolol (Unknown)  penicillin (Rash)    MEDICATIONS  (STANDING):  apixaban 2.5 milliGRAM(s) Oral every 12 hours  atorvastatin 10 milliGRAM(s) Oral at bedtime  diltiazem    milliGRAM(s) Oral <User Schedule>  furosemide   Injectable 40 milliGRAM(s) IV Push two times a day    MEDICATIONS  (PRN):      Echocardiogram 4/9/18: Left ventricular hypertrophy presentThe left ventricular ejection fraction is estimated to be 50-55%The right ventricle is not well visualized.Probably normal right ventricular size and function.The left atrium is dilated.The right atrium is dilated.Calcified aortic valve.Previously noted aortic valve mass is not well seenon current study which may be related to study quality (prior study of superior quality).  No aortic regurgitation noted.Mitral annular calcification noted.Mitral valve thickening noted.There is mild to moderate mitral regurgitation.There is moderate tricuspid regurgitation.The pulmonary artery systolic pressure is estimated to be 51 mmHg.There is trace pulmonic regurgitation.There is aortic root sclerosis/calcification.The inferior vena cava is normal in size (<2.1 cm) with normal inspiratory collapse (>50%) consistent with normal right atrial pressure.  A trivial pericardial effusion noted.On 4 chamber flow is noted near interatrial septum; cannot exlcude septal defect. Consider repeat echo for further evaluation.

## 2018-04-09 NOTE — PROGRESS NOTE ADULT - PROBLEM SELECTOR PLAN 1
Patient admitted with increased LE edema. Acute on chronic CHF exacerbation with component of inefficient furosemide due to ANNETTE. Also suspicion for component for dietary non-compliance. BNP 2272. Patient (-) 4.4 kg since admission. CXR with decreased pulmonary congestion.   - c/w IV Lasix 40 mg BID  - continue to hold Valsartan in the setting of ANNETTE   - ECHO ordered, f/u results   - 1L fluid restriction  - Strict I/O's, daily weights Patient admitted with increased LE edema. Acute on chronic CHF exacerbation with component of inefficient furosemide due to ANNETTE and dietary non-compliance. BNP 2272. Patient (-) 4.4 kg since admission. CXR with decreased pulmonary congestion.   - c/w IV Lasix 40 mg BID  - continue to hold Valsartan in the setting of ANNETTE   - ECHO performed w/ EF 50-55, LVH, dilated LA/RA, mild-mod MR, mod TR, flow noted to interatrial septum cannot r/o septal defect, consider repeat echo  - 1L fluid restriction  - Strict I/O's, daily weights

## 2018-04-09 NOTE — PROGRESS NOTE ADULT - PROBLEM SELECTOR PLAN 2
On admission BUN/creat 32/1.84. On discharge in Feb, 25/0.86. Suspicion for decreased PO intake. No history of bladder obstruction and patient has been urinating with IV lasix currently.   - Urine lytes consisted with intrinsic renal disease (FEUrea 42.2%), however patient urinating without s/sx retention   - Trend BUN/Creat. BUN/Creat today 39/1.56 likely 2/2 IV lasix, creatinine downtrending.   - Continue to hold Valsartan in the setting of ANNETTE   - Renally dose all medications  - Avoid nephrotoxic agents

## 2018-04-09 NOTE — PROGRESS NOTE ADULT - PROBLEM SELECTOR PLAN 4
Patient with history of HTN. Normotensive on admission and today.   - Continue to hold Valsartan in the setting of ANNETTE  - Continue to hold Valsartan in the setting of ANNETTE Patient with history of HTN. Normotensive on admission and today.   - Continue to hold Valsartan in the setting of ANNETTE  - C/w Cardizem CD 120mg qd

## 2018-04-09 NOTE — CONSULT NOTE ADULT - SUBJECTIVE AND OBJECTIVE BOX
CHIEF COMPLAINT:  Afib  CHF    HISTORY OF PRESENT ILLNESS:  89 year old woman with history of afib on Eliquis, CHF EF 50% with severe RA dilation and moderate LA dilation , HTN recently admitted to [facility] in February for afib with rapid ventricular response with chf exacerbation who presented to ED today with progressive weakness   hospital in [place] for pneumonia found to be in rapid afib 3 months ago now controlled with PO diltiazem, who presents for worsening LE edema, UE edema in the hands which has been progressively worsening since patient has been discharged. She was discharged on furosemide 20mg Qdaily and recently increased to 20mg BID by Dr. Kemp which according to her, "has not helped". She has not had orthopnea, has not had PND, has not had shortness of breath, chest pain, and has not noticed a decrease in her physical ability. +nausea, vomited once over the last few days.     MY ASSESSMENT:  Patient has chronic AF, on anticoagulation  Diastolic CHF  Patient sitting comfortably in a chair - "improved with diuresis"      PAST MEDICAL & SURGICAL HISTORY:  CHF (congestive heart failure)  Atrial fibrillation  HTN (hypertension)  H/O right knee surgery      PERTINENT DIAGNOSTIC TESTING:    [ ] Echocardiogram:      Allergies:    metoprolol (Unknown)  penicillin (Rash)    Intolerances      MEDICATIONS:  diltiazem    milliGRAM(s) Oral <User Schedule>  furosemide   Injectable 40 milliGRAM(s) IV Push two times a day  atorvastatin 10 milliGRAM(s) Oral at bedtime  apixaban 2.5 milliGRAM(s) Oral every 12 hours      FAMILY HISTORY:  No pertinent family history in first degree relatives      SOCIAL HISTORY:    [ ] Non-smoker  [ ] Smoker  [ ] Alcohol        REVIEW OF SYSTEMS:    CONSTITUTIONAL: No fever, weight loss, or fatigue  EYES: No eye pain, visual disturbances, or discharge  ENMT:  No difficulty hearing, tinnitus, vertigo; No sinus or throat pain  NECK: No pain or stiffness  BREASTS: No pain, masses, or nipple discharge  RESPIRATORY: No cough, wheezing, chills or hemoptysis; No Shortness of Breath  CARDIOVASCULAR: No chest pain, palpitations, dizziness, or leg swelling  GASTROINTESTINAL: No abdominal or epigastric pain. No nausea, vomiting, or hematemesis; No diarrhea or constipation. No melena or hematochezia.  GENITOURINARY: No dysuria, frequency, hematuria, or incontinence  NEUROLOGICAL: No headaches, memory loss, loss of strength, numbness, or tremors  SKIN: No itching, burning, rashes, or lesions   LYMPH Nodes: No enlarged glands  ENDOCRINE: No heat or cold intolerance; No hair loss  MUSCULOSKELETAL: No joint pain or swelling; No muscle, back, or extremity pain  PSYCHIATRIC: No depression, anxiety, mood swings, or difficulty sleeping  HEME/LYMPH: No easy bruising, or bleeding gums  ALLERY AND IMMUNOLOGIC: No hives or eczema	    [ ] All others negative	  [ ] Unable to obtain    PHYSICAL EXAM:  T(C): 36.6 (04-09-18 @ 13:59), Max: 37.2 (04-08-18 @ 18:50)  HR: 90 (04-09-18 @ 17:04) (76 - 96)  BP: 114/65 (04-09-18 @ 17:04) (110/68 - 156/85)  RR: 18 (04-09-18 @ 17:04) (16 - 20)  SpO2: 98% (04-09-18 @ 17:04) (92% - 98%)  Wt(kg): --  I&O's Summary    08 Apr 2018 07:01  -  09 Apr 2018 07:00  --------------------------------------------------------  IN: 837 mL / OUT: 1250 mL / NET: -413 mL    09 Apr 2018 07:01  -  09 Apr 2018 17:42  --------------------------------------------------------  IN: 940 mL / OUT: 1350 mL / NET: -410 mL        TELEMETRY: 	    ECG:     Appearance: Normal	  HEENT:   Normal oral mucosa, PERRL, EOMI	  Cardiovascular: Normal S1 S2, No JVD, No murmurs, No edema  Respiratory: Lungs clear to auscultation	  Gastrointestinal:  Soft, Non-tender, + BS	  Neurologic: A&O x 3, Non-focal  Extremities: Normal range of motion, No clubbing, cyanosis or edema  Vascular: Peripheral pulses palpable 2+ bilaterally    	  LABS:	 	    CARDIAC MARKERS:                          12.8   6.4   )-----------( 184      ( 09 Apr 2018 06:30 )             40.4     04-09    138  |  101  |  39<H>  ----------------------------<  99  3.7   |  27  |  1.56<H>    Ca    8.3<L>      09 Apr 2018 06:30  Mg     1.9     04-09    TPro  5.8<L>  /  Alb  2.4<L>  /  TBili  0.4  /  DBili  x   /  AST  23  /  ALT  10  /  AlkPhos  80  04-08    proBNP:   Lipid Profile:   HgA1c:   TSH:     ASSESSMENT/PLAN: 	    89 yo woman PMH as above presents with worsening appetite, fatigue and LE swelling with failure of Lasix PO to facilitate urination found to be in CHF exacerbation associated with failure of lasix due to acute kidney injury 2/2 decreased PO intake with possibility of dietary non-compliance.     Acute congestive heart failure, diastolic  Chronic AF  - needs better rate control    Recommend:  Uptitrate Diltiazem for better rate control  Continue diuresis  Eliquis for stroke / TE prevention    Discussed with patient and 5L team  Will d/w Dr. Marshall

## 2018-04-09 NOTE — PROGRESS NOTE ADULT - SUBJECTIVE AND OBJECTIVE BOX
89 year old woman with history of afib on eliquis, CHF EF 50% with severe RA dilation and moderate LA dilation , HTN recently admitted to [facility] in February for afib with rapid ventricular response with chf exacerbation who presented to ED today with progressive weakness   hospital [place] for pneumonia found to be in rapid afib 3 months ago now controlled with PO diltiazem, who presents for worsening LE edema, UE edema in the hands which has been progressively worsening since patient has been discharged. She was discharged on furosemide 20mg Qdaily and recently increased to 20mg BID by Dr. Kemp which according to her, "has not helped". She has not had orthopnea, has not had PND, has not had shortness of breath, chest pain, and has not noticed a decrease in her physical ability. +nausea, vomited once over the last few days.     Of note patient has had decrease appetite since discharge from [facility]. Diet consists of beans, rice      In the ED:  ICU Vital Signs Last 24 Hrs  T(C): 37.4 (07 Apr 2018 18:03), Max: 37.4 (07 Apr 2018 18:03)  T(F): 99.3 (07 Apr 2018 18:03), Max: 99.3 (07 Apr 2018 18:03)  HR: 86 (07 Apr 2018 17:24) (86 - 106)  BP: 140/66 (07 Apr 2018 17:24) (140/66 - 173/73)  BP(mean): 95 (07 Apr 2018 17:24) (95 - 116)  ABP: --  ABP(mean): --  RR: 18 (07 Apr 2018 17:24) (18 - 20)  SpO2: 95% (07 Apr 2018 17:24) (94% - 96%)    Received 40 IV lasix x1, Troponin negative x1, showed new ANNETTE and admitted to cardiac tele for CHF exacerbation with Bnp>2000 with EKG showing Atrial fibrillation with left axis deviation and LVH by modified andreea criteria. CXR with bilateral pleural effusions.     Past Medical History:  Atrial fibrillation    CHF (congestive heart failure)    HTN (hypertension).    Past Surgical History:  H/O right knee surgery.      	  MEDICATIONS:  diltiazem    milliGRAM(s) Oral <User Schedule>  furosemide   Injectable 40 milliGRAM(s) IV Push two times a day            atorvastatin 10 milliGRAM(s) Oral at bedtime    apixaban 2.5 milliGRAM(s) Oral every 12 hours  magnesium sulfate  IVPB 1 Gram(s) IV Intermittent once  potassium chloride    Tablet ER 40 milliEquivalent(s) Oral once      Complaint:     Otherwise 12 point review of systems is normal.    PHYSICAL EXAM:    Constitutional: NAD  Eyes: PERRL, EOMI, sclera non-icteric  Neck: supple, no masses, no JVD  Respiratory:  no wheezing, rhonchi, +rales,   Cardiovascular: normal S1S2, +m  Gastrointestinal: soft, NTND, no masses palpable, BS normal in all four quadrants,   Extremities:  no c/c/e  Neurological: AAOx3  Temp:Afebrile    ICU Vital Signs Last 24 Hrs  T(C): 37 (09 Apr 2018 08:37), Max: 37.2 (08 Apr 2018 18:50)  T(F): 98.6 (09 Apr 2018 08:37), Max: 98.9 (08 Apr 2018 18:50)  HR: 84 (09 Apr 2018 06:28) (76 - 122)  BP: 136/78 (09 Apr 2018 06:28) (118/59 - 160/79)  BP(mean): 94 (09 Apr 2018 06:28) (85 - 114)  ABP: --  ABP(mean): --  RR: 18 (09 Apr 2018 06:28) (16 - 20)  SpO2: 92% (09 Apr 2018 06:28) (92% - 98%)              CHEST X RAY  < from: Xray Chest 1 View- PORTABLE-Routine (04.08.18 @ 06:53) >  INTERPRETATION:  Clinical History: Congestion    Portable examination the chest demonstrates cardiomegaly. Bilateral   effusions. Chronic interstitial changes. Congestion and/or infiltrates   cannot be excluded.    Impression: Bilateral effusions. All congestion and/or infiltrates cannot   be excluded        CT    MRI    MRA    CT ANGIO    CAROTID DUPLEX    DUPLEX     Echocardiogram    Catheterization:    Stress Test:     LABS:	 	  CARDIAC MARKERS:  Troponin T, Serum: 0.01 ng/mL [0.00 - 0.01] (04-08 @ 04:37)  Troponin T, Serum: 0.02 ng/mL <H> [0.00 - 0.01] (04-07 @ 22:17)  Troponin T, Serum: <0.01 ng/mL [0.00 - 0.01] (04-07 @ 14:00)                              12.8   6.4   )-----------( 184      ( 09 Apr 2018 06:30 )             40.4     04-09    138  |  101  |  39<H>  ----------------------------<  99  3.7   |  27  |  1.56<H>    Ca    8.3<L>      09 Apr 2018 06:30  Mg     1.9     04-09    TPro  5.8<L>  /  Alb  2.4<L>  /  TBili  0.4  /  DBili  x   /  AST  23  /  ALT  10  /  AlkPhos  80  04-08    proBNP: Serum Pro-Brain Natriuretic Peptide: 2272 pg/mL (04-07 @ 14:00)    Lipid Profile:   HgA1c: Hemoglobin A1C, Whole Blood: 6.1 % (04-08 @ 04:38)    TSH: Thyroid Stimulating Hormone, Serum: 1.371 uIU/mL (04-08 @ 04:37)            ASSESSMENT/PLAN: 	  89 yo woman pmh of afib (on Eliquis), HFpEF (50%) w/ severe RA dilation and moderate LA dilation and HTN presents with worsening appetite, fatigue and LE swelling. Found to be in acute on chronic CHF exacerbation associated with failure of Lasix po due to acute kidney injury 2/2 decreased PO intake with possibility of dietary non-compliance.     Problem/Plan - 1:  ·  Problem: Acute congestive heart failure, unspecified heart failure type.  Plan: Acute on chronic CHF exacerbation with component of inefficient furosemide due to ANNETTE. Also suspicion for component for dietary non-compliance. BNP 2272. Pt is still volume overloaded on exam.   -Daily weights, strict I/Os, FR 1L/day. Daily BMP/Mg. Keep K>4 and Mag >2.   -s/p lasix 40 IV in ER  -continue Furosemide 40mg IV bid  -official TTE on Monday  -Hold Valsartan given ANNETTE.     Problem/Plan - 2:  ·  Problem: ANNETTE (acute kidney injury).  Plan: Suspicion for decreased PO intake. No history of bladder obstruction and patient has been urinating with IV lasix currently.   -F/u urine lytes   -Daily BMP/Mg  -Hold Valsartan given rise in Cr  -Renally dose all medications  -Avoid nephrotoxic agents.     Problem/Plan - 3:  ·  Problem: Atrial fibrillation.  Plan: Patient with history of Afib. CHADsVASc: 5  -AC: Reduced dose of Eliquis to 2.5mg BID based on age and creatinine.   -Rate control: Restart home dose of Cardizem CD 120mg po Q12 as pt's HR on tele is not controlled, HR 120s-130s.     Problem/Plan - 4:  ·  Problem: HTN (hypertension).  Plan: Patient with history of HTN.   - On cardizem daily, and  valsartan, which she has taken both today.   -Hold Valsartan for now given rise in Cr.

## 2018-04-09 NOTE — PROGRESS NOTE ADULT - ASSESSMENT
89 y/o F PMHx afib (on Eliquis), HFpEF (50%) w/ severe RA dilation and moderate LA dilation and HTN presents with increased fatigue, LE swelling and worsening appetite admitted for acute on chronic diastolic heart failure exacerbation likely multifactorial a/w failure of lasix vs. dietary non-compliance 89 y/o F PMHx afib (on Eliquis), HFpEF (50%) w/ severe RA dilation and moderate LA dilation and HTN presents with increased fatigue, LE swelling and worsening appetite admitted for acute on chronic diastolic heart failure exacerbation likely multifactorial a/w failure of lasix vs. dietary non-compliance.

## 2018-04-09 NOTE — PROGRESS NOTE ADULT - PROBLEM SELECTOR PLAN 6
N: dash diet. 1000 cc fluid restriction  E: Replete for lytes K > 4 Mag > 2  F: no iVF  Code status: Full code. Patient;s daughters are HCP. Full Code for now. Discussed with pt and requesting to speak with daughter regarding Code status.   Dispo: Home with Home PT pending clinical progress N: DASH/TLC diet. 1000 cc fluid restriction  E: Replete for lytes K > 4 Mag > 2  F: no IVF  Code status: Full code. Patient;s daughters are HCP. Full Code for now. Discussed with pt and requesting to speak with daughter regarding Code status.   Dispo: Home with Home PT pending clinical progress

## 2018-04-10 LAB
ANION GAP SERPL CALC-SCNC: 9 MMOL/L — SIGNIFICANT CHANGE UP (ref 5–17)
APPEARANCE UR: CLEAR — SIGNIFICANT CHANGE UP
BILIRUB UR-MCNC: NEGATIVE — SIGNIFICANT CHANGE UP
BUN SERPL-MCNC: 49 MG/DL — HIGH (ref 7–23)
CALCIUM SERPL-MCNC: 8.4 MG/DL — SIGNIFICANT CHANGE UP (ref 8.4–10.5)
CHLORIDE SERPL-SCNC: 98 MMOL/L — SIGNIFICANT CHANGE UP (ref 96–108)
CO2 SERPL-SCNC: 29 MMOL/L — SIGNIFICANT CHANGE UP (ref 22–31)
COLOR SPEC: YELLOW — SIGNIFICANT CHANGE UP
CREAT ?TM UR-MCNC: 41 MG/DL — SIGNIFICANT CHANGE UP
CREAT SERPL-MCNC: 1.63 MG/DL — HIGH (ref 0.5–1.3)
DIFF PNL FLD: (no result)
GLUCOSE SERPL-MCNC: 100 MG/DL — HIGH (ref 70–99)
GLUCOSE UR QL: NEGATIVE — SIGNIFICANT CHANGE UP
HCT VFR BLD CALC: 41.6 % — SIGNIFICANT CHANGE UP (ref 34.5–45)
HGB BLD-MCNC: 13.1 G/DL — SIGNIFICANT CHANGE UP (ref 11.5–15.5)
KETONES UR-MCNC: NEGATIVE — SIGNIFICANT CHANGE UP
LEUKOCYTE ESTERASE UR-ACNC: NEGATIVE — SIGNIFICANT CHANGE UP
MAGNESIUM SERPL-MCNC: 2.1 MG/DL — SIGNIFICANT CHANGE UP (ref 1.6–2.6)
MCHC RBC-ENTMCNC: 30.1 PG — SIGNIFICANT CHANGE UP (ref 27–34)
MCHC RBC-ENTMCNC: 31.5 G/DL — LOW (ref 32–36)
MCV RBC AUTO: 95.6 FL — SIGNIFICANT CHANGE UP (ref 80–100)
NITRITE UR-MCNC: NEGATIVE — SIGNIFICANT CHANGE UP
OSMOLALITY UR: 369 MOSMOL/KG — SIGNIFICANT CHANGE UP (ref 100–650)
PH UR: 6 — SIGNIFICANT CHANGE UP (ref 5–8)
PLATELET # BLD AUTO: 207 K/UL — SIGNIFICANT CHANGE UP (ref 150–400)
POTASSIUM SERPL-MCNC: 4 MMOL/L — SIGNIFICANT CHANGE UP (ref 3.5–5.3)
POTASSIUM SERPL-SCNC: 4 MMOL/L — SIGNIFICANT CHANGE UP (ref 3.5–5.3)
PROT UR-MCNC: >=300 MG/DL
RBC # BLD: 4.35 M/UL — SIGNIFICANT CHANGE UP (ref 3.8–5.2)
RBC # FLD: 14.1 % — SIGNIFICANT CHANGE UP (ref 10.3–16.9)
SODIUM SERPL-SCNC: 136 MMOL/L — SIGNIFICANT CHANGE UP (ref 135–145)
SODIUM UR-SCNC: 65 MMOL/L — SIGNIFICANT CHANGE UP
SP GR SPEC: 1.02 — SIGNIFICANT CHANGE UP (ref 1–1.03)
UROBILINOGEN FLD QL: 0.2 E.U./DL — SIGNIFICANT CHANGE UP
UUN UR-MCNC: 394 MG/DL — SIGNIFICANT CHANGE UP
WBC # BLD: 7.1 K/UL — SIGNIFICANT CHANGE UP (ref 3.8–10.5)
WBC # FLD AUTO: 7.1 K/UL — SIGNIFICANT CHANGE UP (ref 3.8–10.5)

## 2018-04-10 PROCEDURE — 99232 SBSQ HOSP IP/OBS MODERATE 35: CPT

## 2018-04-10 PROCEDURE — 93010 ELECTROCARDIOGRAM REPORT: CPT

## 2018-04-10 PROCEDURE — 71045 X-RAY EXAM CHEST 1 VIEW: CPT | Mod: 26

## 2018-04-10 RX ORDER — FUROSEMIDE 40 MG
20 TABLET ORAL EVERY 12 HOURS
Qty: 0 | Refills: 0 | Status: DISCONTINUED | OUTPATIENT
Start: 2018-04-10 | End: 2018-04-11

## 2018-04-10 RX ADMIN — Medication 180 MILLIGRAM(S): at 21:20

## 2018-04-10 RX ADMIN — Medication 180 MILLIGRAM(S): at 11:43

## 2018-04-10 RX ADMIN — Medication 40 MILLIGRAM(S): at 06:03

## 2018-04-10 RX ADMIN — APIXABAN 2.5 MILLIGRAM(S): 2.5 TABLET, FILM COATED ORAL at 06:03

## 2018-04-10 RX ADMIN — Medication 20 MILLIGRAM(S): at 17:05

## 2018-04-10 RX ADMIN — ATORVASTATIN CALCIUM 10 MILLIGRAM(S): 80 TABLET, FILM COATED ORAL at 21:20

## 2018-04-10 RX ADMIN — Medication 5 MILLIGRAM(S): at 21:20

## 2018-04-10 RX ADMIN — POLYETHYLENE GLYCOL 3350 17 GRAM(S): 17 POWDER, FOR SOLUTION ORAL at 06:06

## 2018-04-10 RX ADMIN — APIXABAN 2.5 MILLIGRAM(S): 2.5 TABLET, FILM COATED ORAL at 18:41

## 2018-04-10 NOTE — PROGRESS NOTE ADULT - PROBLEM SELECTOR PROBLEM 5
Moderate protein-calorie malnutrition

## 2018-04-10 NOTE — PROGRESS NOTE ADULT - ASSESSMENT
89 y/o F PMHx afib (on Eliquis), HFpEF (50%) and HTN presented 4/7 with increased  fatigue, LE swelling and worsening appetite admitted for acute on chronic diastolic heart failure exacerbation likely multifactorial a/w failure of lasix vs. dietary non-compliance.

## 2018-04-10 NOTE — PROGRESS NOTE ADULT - SUBJECTIVE AND OBJECTIVE BOX
Patient seen and examined at bedside in AM.        TELEMETRY: Afib (80s), 2 episodes of rapid Afib 130s when ambulating to bathroom -asymptomatic     EKG:  Afib @ 92 BPM    ICU Vital Signs Last 24 Hrs  T(C): 36.8 (10 Apr 2018 18:14), Max: 37 (10 Apr 2018 14:00)  T(F): 98.3 (10 Apr 2018 18:14), Max: 98.6 (10 Apr 2018 14:00)  HR: 82 (10 Apr 2018 17:01) (78 - 108)  BP: 119/77 (10 Apr 2018 17:01) (105/59 - 168/79)  BP(mean): 98 (10 Apr 2018 17:01) (76 - 104)  ABP: --  ABP(mean): --  RR: 18 (10 Apr 2018 17:01) (17 - 18)  SpO2: 95% (10 Apr 2018 17:01) (92% - 96%)          PHYSICAL EXAM:    General: NAD  Neck: Supple, NO JVD  Cardiac: S1 S2  Pulmonary: Crackles LLL  Abdomen: Soft, Non -tender, +BS x 4 quads  Extremities: +2-+3 BL LE edema,   Neuro: A/o x 3,           LABS:                          13.1   7.1   )-----------( 207      ( 10 Apr 2018 06:19 )             41.6                              04-10    136  |  98  |  49<H>  ----------------------------<  100<H>  4.0   |  29  |  1.63<H>    Ca    8.4      10 Apr 2018 06:17  Mg     2.1     04-10                                CAPILLARY BLOOD GLUCOSE                Allergies:  metoprolol (Unknown)  penicillin (Rash)    MEDICATIONS  (STANDING):  apixaban 2.5 milliGRAM(s) Oral every 12 hours  atorvastatin 10 milliGRAM(s) Oral at bedtime  bisacodyl 5 milliGRAM(s) Oral at bedtime  diltiazem    milliGRAM(s) Oral <User Schedule>  furosemide    Tablet 20 milliGRAM(s) Oral every 12 hours    MEDICATIONS  (PRN):  polyethylene glycol 3350 17 Gram(s) Oral daily PRN Constipation

## 2018-04-10 NOTE — PROGRESS NOTE ADULT - SUBJECTIVE AND OBJECTIVE BOX
CARDIOLOGY NP PROGRESS NOTE    Subjective: Patient seen and examined at bedside. Patient states LE edema slightly improved. Denies CP, SOB, dizziness/diaphoresis, n/v, palpitations. Remainder ROS otherwise negative.    Overnight Events: No acute events overnight    TELEMETRY: Afib (80s)     EKG:       VITAL SIGNS:  T(C): 36.8 (04-10-18 @ 10:30), Max: 36.9 (04-09-18 @ 22:27)  HR: 84 (04-10-18 @ 05:00) (80 - 94)  BP: 110/70 (04-10-18 @ 05:00) (105/59 - 144/70)  RR: 17 (04-10-18 @ 05:00) (17 - 18)  SpO2: 95% (04-10-18 @ 05:00) (92% - 98%)  Wt(kg): --    I&O's Summary    09 Apr 2018 07:01  -  10 Apr 2018 07:00  --------------------------------------------------------  IN: 940 mL / OUT: 2550 mL / NET: -1610 mL    10 Apr 2018 07:01  -  10 Apr 2018 11:32  --------------------------------------------------------  IN: 180 mL / OUT: 0 mL / NET: 180 mL          PHYSICAL EXAM:    General: A/ox 3, No acute Distress  Neck: Supple, NO JVD  Cardiac: S1 S2, No M/R/G  Pulmonary: CTAB, Breathing unlabored, No Rhonchi/Rales/Wheezing  Abdomen: Soft, Non -tender, +BS x 4 quads  Extremities: No Rashes, No edema  Neuro: A/o x 3, No focal deficits          LABS:                          13.1   7.1   )-----------( 207      ( 10 Apr 2018 06:19 )             41.6                              04-10    136  |  98  |  49<H>  ----------------------------<  100<H>  4.0   |  29  |  1.63<H>    Ca    8.4      10 Apr 2018 06:17  Mg     2.1     04-10                                CAPILLARY BLOOD GLUCOSE                Allergies:  metoprolol (Unknown)  penicillin (Rash)    MEDICATIONS  (STANDING):  apixaban 2.5 milliGRAM(s) Oral every 12 hours  atorvastatin 10 milliGRAM(s) Oral at bedtime  bisacodyl 5 milliGRAM(s) Oral at bedtime  diltiazem    milliGRAM(s) Oral <User Schedule>  furosemide    Tablet 20 milliGRAM(s) Oral every 12 hours    MEDICATIONS  (PRN):  polyethylene glycol 3350 17 Gram(s) Oral daily PRN Constipation        DIAGNOSTIC TESTS: CARDIOLOGY NP PROGRESS NOTE    Subjective: Patient seen and examined at bedside. Patient states LE edema slightly improved. Denies CP, SOB, dizziness/diaphoresis, n/v, palpitations. Remainder ROS otherwise negative.    Overnight Events: No acute events overnight    TELEMETRY: Afib (80s)     EKG:  Afib @ 92 BPM      VITAL SIGNS:  T(C): 36.8 (04-10-18 @ 10:30), Max: 36.9 (04-09-18 @ 22:27)  HR: 84 (04-10-18 @ 05:00) (80 - 94)  BP: 110/70 (04-10-18 @ 05:00) (105/59 - 144/70)  RR: 17 (04-10-18 @ 05:00) (17 - 18)  SpO2: 95% (04-10-18 @ 05:00) (92% - 98%)  Wt(kg): --    I&O's Summary    09 Apr 2018 07:01  -  10 Apr 2018 07:00  --------------------------------------------------------  IN: 940 mL / OUT: 2550 mL / NET: -1610 mL    10 Apr 2018 07:01  -  10 Apr 2018 11:32  --------------------------------------------------------  IN: 180 mL / OUT: 0 mL / NET: 180 mL          PHYSICAL EXAM:    General: A/ox 3, No acute Distress  Neck: Supple, NO JVD  Cardiac: S1 S2  Pulmonary: Crackles LLL  Abdomen: Soft, Non -tender, +BS x 4 quads  Extremities: +2-+3 BL LE edema, no rashes noted   Neuro: A/o x 3, No focal deficits          LABS:                          13.1   7.1   )-----------( 207      ( 10 Apr 2018 06:19 )             41.6                              04-10    136  |  98  |  49<H>  ----------------------------<  100<H>  4.0   |  29  |  1.63<H>    Ca    8.4      10 Apr 2018 06:17  Mg     2.1     04-10                                CAPILLARY BLOOD GLUCOSE                Allergies:  metoprolol (Unknown)  penicillin (Rash)    MEDICATIONS  (STANDING):  apixaban 2.5 milliGRAM(s) Oral every 12 hours  atorvastatin 10 milliGRAM(s) Oral at bedtime  bisacodyl 5 milliGRAM(s) Oral at bedtime  diltiazem    milliGRAM(s) Oral <User Schedule>  furosemide    Tablet 20 milliGRAM(s) Oral every 12 hours    MEDICATIONS  (PRN):  polyethylene glycol 3350 17 Gram(s) Oral daily PRN Constipation CARDIOLOGY NP PROGRESS NOTE    Subjective: Patient seen and examined at bedside. Patient states LE edema slightly improved. Denies CP, SOB, dizziness/diaphoresis, n/v, palpitations. Remainder ROS otherwise negative.    Overnight Events: No acute events overnight    TELEMETRY: Afib (80s), 2 episodes of rapid Afib 130s when ambulating to bathroom -asymptomatic     EKG:  Afib @ 92 BPM      VITAL SIGNS:  T(C): 36.8 (04-10-18 @ 10:30), Max: 36.9 (04-09-18 @ 22:27)  HR: 84 (04-10-18 @ 05:00) (80 - 94)  BP: 110/70 (04-10-18 @ 05:00) (105/59 - 144/70)  RR: 17 (04-10-18 @ 05:00) (17 - 18)  SpO2: 95% (04-10-18 @ 05:00) (92% - 98%)  Wt(kg): --    I&O's Summary    09 Apr 2018 07:01  -  10 Apr 2018 07:00  --------------------------------------------------------  IN: 940 mL / OUT: 2550 mL / NET: -1610 mL    10 Apr 2018 07:01  -  10 Apr 2018 11:32  --------------------------------------------------------  IN: 180 mL / OUT: 0 mL / NET: 180 mL          PHYSICAL EXAM:    General: A/ox 3, No acute Distress  Neck: Supple, NO JVD  Cardiac: S1 S2  Pulmonary: Crackles LLL  Abdomen: Soft, Non -tender, +BS x 4 quads  Extremities: +2-+3 BL LE edema, no rashes noted   Neuro: A/o x 3, No focal deficits          LABS:                          13.1   7.1   )-----------( 207      ( 10 Apr 2018 06:19 )             41.6                              04-10    136  |  98  |  49<H>  ----------------------------<  100<H>  4.0   |  29  |  1.63<H>    Ca    8.4      10 Apr 2018 06:17  Mg     2.1     04-10                                CAPILLARY BLOOD GLUCOSE                Allergies:  metoprolol (Unknown)  penicillin (Rash)    MEDICATIONS  (STANDING):  apixaban 2.5 milliGRAM(s) Oral every 12 hours  atorvastatin 10 milliGRAM(s) Oral at bedtime  bisacodyl 5 milliGRAM(s) Oral at bedtime  diltiazem    milliGRAM(s) Oral <User Schedule>  furosemide    Tablet 20 milliGRAM(s) Oral every 12 hours    MEDICATIONS  (PRN):  polyethylene glycol 3350 17 Gram(s) Oral daily PRN Constipation

## 2018-04-10 NOTE — PROGRESS NOTE ADULT - PROBLEM SELECTOR PROBLEM 1
Acute on chronic diastolic (congestive) heart failure
Acute on chronic diastolic (congestive) heart failure
Acute congestive heart failure, unspecified heart failure type

## 2018-04-10 NOTE — PROGRESS NOTE ADULT - PROBLEM SELECTOR PLAN 4
Patient with history of HTN. Normotensive on admission and today.   - Continue to hold Valsartan in the setting of ANNETTE  - C/w Cardizem CD 120mg qd Patient with history of HTN. Normotensive on admission and today.   - Continue to hold Valsartan in the setting of ANNETTE  - Cardizem 120 mg BID increased to 180 mg BID as discussed with Dr. Hardwick given HR > 110 while ambulating

## 2018-04-10 NOTE — PROGRESS NOTE ADULT - ASSESSMENT
89 y/o F PMHx afib (on Eliquis), HFpEF (50%) and HTN presented 4/7 with increased  fatigue, LE swelling and worsening appetite admitted for acute on chronic diastolic heart failure exacerbation likely multifactorial a/w failure of lasix vs. dietary non-compliance.    Problem/Plan - 1:  ·  Problem: Acute on chronic diastolic (congestive) heart failure.  Plan: Patient admitted with increased LE edema. Acute on chronic CHF exacerbation with component of inefficient furosemide due to ANNETTE and dietary non-compliance. BNP 2272. Patient (-) 1.2 L in 24 hours with net neg 4.6 L since admission.   - IV Lasix 40mg BID changed to 20mg PO BID   - continue to hold Valsartan in the setting of ANNETTE   - ECHO performed w/ EF 50-55, LVH, dilated LA/RA, mild-mod MR, mod TR, flow noted to interatrial septum cannot r/o septal defect, consider repeat echo w/ bubble as outpatient per Dr Kemp  - 1L fluid restriction  - Strict I/O's, daily weights.     Problem/Plan - 2:  ·  Problem: ANNETTE (acute kidney injury).  Plan: On admission BUN/creat 32/1.84. On discharge in Feb, 25/0.86. Suspicion for decreased PO intake. No history of bladder obstruction and patient has been urinating with IV lasix currently.   - Urine lytes consistent with pre-renal disease, FEUrea 32%  - Trend BUN/Creat. BUN/Creat today 49/1.63 likely 2/2 IV lasix  - Continue to hold Valsartan in the setting of ANNETTE   - Renally dose all medications  - Avoid nephrotoxic agents.     Problem/Plan - 3:  ·  Problem: Atrial fibrillation.  Plan: Patient with history of Afib. CHADsVASc: 5.   - c/w Eliquis 2.5 mg BID (decreased 2/2 age and creatinine)   - Cardizem 120 mg BID increased to 180 mg BID as discussed with Dr. Hardwick given HR > 110 while ambulating.     Problem/Plan - 4:  ·  Problem: HTN (hypertension).  Plan: Patient with history of HTN. Normotensive on admission and today.   - Continue to hold Valsartan in the setting of ANNETTE  - Cardizem 120 mg BID increased to 180 mg BID as discussed with Dr. Hardwick given HR > 110 while ambulating.     Problem/Plan - 5:  ·  Problem: Moderate protein-calorie malnutrition.  Plan: Decreased PO intake recently. Albumin 2.6 from 4.1 on last admission.   - Nutritional consult, dietician following patient, appreciate recs   - encourage increased PO intake to meet nutritional needs, no supplements needed at this time   - SW consulted for home health aid.     Problem/Plan - 6:  Problem: Nutrition, metabolism, and development symptoms. Plan: N: DASH/TLC diet. 1000 cc fluid restriction  E: Replete for lytes K > 4 Mag > 2  F: no IVF  Code status: Full code. Patient;s daughters are HCP. Full Code for now. Discussed with pt and requesting to speak with daughter regarding Code status.   Dispo: Home with Home PT pending clinical progress.

## 2018-04-10 NOTE — PROGRESS NOTE ADULT - PROBLEM SELECTOR PLAN 6
N: DASH/TLC diet. 1000 cc fluid restriction  E: Replete for lytes K > 4 Mag > 2  F: no IVF  Code status: Full code. Patient;s daughters are HCP. Full Code for now. Discussed with pt and requesting to speak with daughter regarding Code status.   Dispo: Home with Home PT pending clinical progress N: DASH/TLC diet. 1000 cc fluid restriction  E: Replete for lytes K > 4 Mag > 2  F: no IVF  Code status: Full code. Patient;s daughters are HCP. Full Code for now. Discussed with pt and requesting to speak with daughter regarding Code status.   Dispo: Home with Home PT pending clinical progress. Daughter requesting to speak with SW/ regarding possible long term care vs [facility] as she and pt's wife has difficulty caring for pt at home. Pending dispo planning. N: DASH/TLC diet. 1000 cc fluid restriction  E: Replete for lytes K > 4 Mag > 2  F: no IVF  Code status: Full code. Patient;s daughters are HCP. Full Code for now. Discussed with pt and requesting to speak with daughter regarding Code status.   Dispo: Home with Home PT pending clinical progress.

## 2018-04-10 NOTE — PROGRESS NOTE ADULT - PROBLEM SELECTOR PLAN 3
Patient with history of Afib. CHADsVASc: 5.   - c/w Eliquis 2.5 mg BID (decreased 2/2 age and creatinine)   - c/w HD Cardizem 120 mg BID  - Dr. Hardwick to see patient in hospital for history of Afib Patient with history of Afib. CHADsVASc: 5.   - c/w Eliquis 2.5 mg BID (decreased 2/2 age and creatinine)   - Cardizem 120 mg BID increased to 180 mg BID as discussed with Dr. Hardwick given HR > 110 while ambulating

## 2018-04-10 NOTE — PROGRESS NOTE ADULT - PROBLEM SELECTOR PLAN 2
On admission BUN/creat 32/1.84. On discharge in Feb, 25/0.86. Suspicion for decreased PO intake. No history of bladder obstruction and patient has been urinating with IV lasix currently.   - Urine lytes consisted with intrinsic renal disease (FEUrea 42.2%), however patient urinating without s/sx retention   - Trend BUN/Creat. BUN/Creat today 39/1.56 likely 2/2 IV lasix, creatinine downtrending.   - Continue to hold Valsartan in the setting of ANNETTE   - Renally dose all medications  - Avoid nephrotoxic agents On admission BUN/creat 32/1.84. On discharge in Feb, 25/0.86. Suspicion for decreased PO intake. No history of bladder obstruction and patient has been urinating with IV lasix currently.   - Urine lytes consisted with intrinsic renal disease (FEUrea 42.2%), however patient urinating without s/sx retention.  - Repeat urine lytes sent, f/u results   - Trend BUN/Creat. BUN/Creat today 49/1.63 likely 2/2 IV lasix  - Continue to hold Valsartan in the setting of ANNETTE   - Renally dose all medications  - Avoid nephrotoxic agents On admission BUN/creat 32/1.84. On discharge in Feb, 25/0.86. Suspicion for decreased PO intake. No history of bladder obstruction and patient has been urinating with IV lasix currently.   - Urine lytes consistent with pre-renal disease, FEUrea 32%  - Trend BUN/Creat. BUN/Creat today 49/1.63 likely 2/2 IV lasix  - Continue to hold Valsartan in the setting of ANNETTE   - Renally dose all medications  - Avoid nephrotoxic agents

## 2018-04-10 NOTE — PROGRESS NOTE ADULT - PROBLEM SELECTOR PLAN 5
Decreased PO intake recently. Albumin 2.6 from 4.1 on last admission.   - Nutritional consult, dietician following patient   - SW consulted for home health aid Decreased PO intake recently. Albumin 2.6 from 4.1 on last admission.   - Nutritional consult, dietician following patient, appreciate recs   - encourage increased PO intake to meet nutritional needs, no supplements needed at this time   - SW consulted for home health aid Decreased PO intake recently. Albumin 2.6 from 4.1 on last admission.   - Nutritional consult, dietician following patient, appreciate recs   - encourage increased PO intake to meet nutritional needs, no supplements needed at this time   - SW consulted for home health aid.

## 2018-04-10 NOTE — PROGRESS NOTE ADULT - PROBLEM SELECTOR PLAN 1
Patient admitted with increased LE edema. Acute on chronic CHF exacerbation with component of inefficient furosemide due to ANNETTE and dietary non-compliance. BNP 2272. Patient (-) 1.2 L in 24 hours with net neg 4.6 L since admission.   - c/w IV Lasix 40 mg BID  - continue to hold Valsartan in the setting of ANNETTE   - ECHO performed w/ EF 50-55, LVH, dilated LA/RA, mild-mod MR, mod TR, flow noted to interatrial septum cannot r/o septal defect, consider repeat echo  - 1L fluid restriction  - Strict I/O's, daily weights Patient admitted with increased LE edema. Acute on chronic CHF exacerbation with component of inefficient furosemide due to ANNETTE and dietary non-compliance. BNP 2272. Patient (-) 1.2 L in 24 hours with net neg 4.6 L since admission.   - IV Lasix 40mg BID changed to 20mg PO BID   - continue to hold Valsartan in the setting of ANNETTE   - ECHO performed w/ EF 50-55, LVH, dilated LA/RA, mild-mod MR, mod TR, flow noted to interatrial septum cannot r/o septal defect, consider repeat echo  - 1L fluid restriction  - Strict I/O's, daily weights Patient admitted with increased LE edema. Acute on chronic CHF exacerbation with component of inefficient furosemide due to ANNETTE and dietary non-compliance. BNP 2272. Patient (-) 1.2 L in 24 hours with net neg 4.6 L since admission.   - IV Lasix 40mg BID changed to 20mg PO BID   - continue to hold Valsartan in the setting of ANNETTE   - ECHO performed w/ EF 50-55, LVH, dilated LA/RA, mild-mod MR, mod TR, flow noted to interatrial septum cannot r/o septal defect, consider repeat echo w/ bubble as outpatient per Dr Kemp  - 1L fluid restriction  - Strict I/O's, daily weights

## 2018-04-11 VITALS — TEMPERATURE: 98 F

## 2018-04-11 LAB
ANION GAP SERPL CALC-SCNC: 13 MMOL/L — SIGNIFICANT CHANGE UP (ref 5–17)
BUN SERPL-MCNC: 47 MG/DL — HIGH (ref 7–23)
CALCIUM SERPL-MCNC: 8.4 MG/DL — SIGNIFICANT CHANGE UP (ref 8.4–10.5)
CHLORIDE SERPL-SCNC: 97 MMOL/L — SIGNIFICANT CHANGE UP (ref 96–108)
CO2 SERPL-SCNC: 26 MMOL/L — SIGNIFICANT CHANGE UP (ref 22–31)
CREAT SERPL-MCNC: 1.46 MG/DL — HIGH (ref 0.5–1.3)
GLUCOSE SERPL-MCNC: 101 MG/DL — HIGH (ref 70–99)
HCT VFR BLD CALC: 40.6 % — SIGNIFICANT CHANGE UP (ref 34.5–45)
HGB BLD-MCNC: 12.9 G/DL — SIGNIFICANT CHANGE UP (ref 11.5–15.5)
MAGNESIUM SERPL-MCNC: 2.2 MG/DL — SIGNIFICANT CHANGE UP (ref 1.6–2.6)
MCHC RBC-ENTMCNC: 30.1 PG — SIGNIFICANT CHANGE UP (ref 27–34)
MCHC RBC-ENTMCNC: 31.8 G/DL — LOW (ref 32–36)
MCV RBC AUTO: 94.6 FL — SIGNIFICANT CHANGE UP (ref 80–100)
PLATELET # BLD AUTO: 180 K/UL — SIGNIFICANT CHANGE UP (ref 150–400)
POTASSIUM SERPL-MCNC: 3.8 MMOL/L — SIGNIFICANT CHANGE UP (ref 3.5–5.3)
POTASSIUM SERPL-SCNC: 3.8 MMOL/L — SIGNIFICANT CHANGE UP (ref 3.5–5.3)
RBC # BLD: 4.29 M/UL — SIGNIFICANT CHANGE UP (ref 3.8–5.2)
RBC # FLD: 14.2 % — SIGNIFICANT CHANGE UP (ref 10.3–16.9)
SODIUM SERPL-SCNC: 136 MMOL/L — SIGNIFICANT CHANGE UP (ref 135–145)
WBC # BLD: 6.6 K/UL — SIGNIFICANT CHANGE UP (ref 3.8–10.5)
WBC # FLD AUTO: 6.6 K/UL — SIGNIFICANT CHANGE UP (ref 3.8–10.5)

## 2018-04-11 PROCEDURE — 36415 COLL VENOUS BLD VENIPUNCTURE: CPT

## 2018-04-11 PROCEDURE — 84540 ASSAY OF URINE/UREA-N: CPT

## 2018-04-11 PROCEDURE — 85610 PROTHROMBIN TIME: CPT

## 2018-04-11 PROCEDURE — 85730 THROMBOPLASTIN TIME PARTIAL: CPT

## 2018-04-11 PROCEDURE — 83036 HEMOGLOBIN GLYCOSYLATED A1C: CPT

## 2018-04-11 PROCEDURE — 96374 THER/PROPH/DIAG INJ IV PUSH: CPT

## 2018-04-11 PROCEDURE — 80048 BASIC METABOLIC PNL TOTAL CA: CPT

## 2018-04-11 PROCEDURE — 83735 ASSAY OF MAGNESIUM: CPT

## 2018-04-11 PROCEDURE — 82553 CREATINE MB FRACTION: CPT

## 2018-04-11 PROCEDURE — 85027 COMPLETE CBC AUTOMATED: CPT

## 2018-04-11 PROCEDURE — 93306 TTE W/DOPPLER COMPLETE: CPT

## 2018-04-11 PROCEDURE — 82550 ASSAY OF CK (CPK): CPT

## 2018-04-11 PROCEDURE — 84484 ASSAY OF TROPONIN QUANT: CPT

## 2018-04-11 PROCEDURE — 83935 ASSAY OF URINE OSMOLALITY: CPT

## 2018-04-11 PROCEDURE — 84443 ASSAY THYROID STIM HORMONE: CPT

## 2018-04-11 PROCEDURE — 81001 URINALYSIS AUTO W/SCOPE: CPT

## 2018-04-11 PROCEDURE — 71045 X-RAY EXAM CHEST 1 VIEW: CPT

## 2018-04-11 PROCEDURE — 71045 X-RAY EXAM CHEST 1 VIEW: CPT | Mod: 26

## 2018-04-11 PROCEDURE — 71046 X-RAY EXAM CHEST 2 VIEWS: CPT

## 2018-04-11 PROCEDURE — 82570 ASSAY OF URINE CREATININE: CPT

## 2018-04-11 PROCEDURE — 80053 COMPREHEN METABOLIC PANEL: CPT

## 2018-04-11 PROCEDURE — 80061 LIPID PANEL: CPT

## 2018-04-11 PROCEDURE — 97116 GAIT TRAINING THERAPY: CPT

## 2018-04-11 PROCEDURE — 84300 ASSAY OF URINE SODIUM: CPT

## 2018-04-11 PROCEDURE — 83880 ASSAY OF NATRIURETIC PEPTIDE: CPT

## 2018-04-11 PROCEDURE — 85025 COMPLETE CBC W/AUTO DIFF WBC: CPT

## 2018-04-11 PROCEDURE — 99238 HOSP IP/OBS DSCHRG MGMT 30/<: CPT

## 2018-04-11 PROCEDURE — 93005 ELECTROCARDIOGRAM TRACING: CPT

## 2018-04-11 PROCEDURE — 97161 PT EVAL LOW COMPLEX 20 MIN: CPT

## 2018-04-11 PROCEDURE — 99285 EMERGENCY DEPT VISIT HI MDM: CPT | Mod: 25

## 2018-04-11 RX ORDER — FUROSEMIDE 40 MG
1 TABLET ORAL
Qty: 60 | Refills: 0 | OUTPATIENT
Start: 2018-04-11 | End: 2018-05-10

## 2018-04-11 RX ORDER — APIXABAN 2.5 MG/1
1 TABLET, FILM COATED ORAL
Qty: 0 | Refills: 0 | COMMUNITY

## 2018-04-11 RX ORDER — FUROSEMIDE 40 MG
1 TABLET ORAL
Qty: 0 | Refills: 0 | COMMUNITY

## 2018-04-11 RX ORDER — DILTIAZEM HCL 120 MG
1 CAPSULE, EXT RELEASE 24 HR ORAL
Qty: 60 | Refills: 0 | OUTPATIENT
Start: 2018-04-11 | End: 2018-05-10

## 2018-04-11 RX ORDER — POTASSIUM CHLORIDE 20 MEQ
20 PACKET (EA) ORAL ONCE
Qty: 0 | Refills: 0 | Status: COMPLETED | OUTPATIENT
Start: 2018-04-11 | End: 2018-04-11

## 2018-04-11 RX ORDER — APIXABAN 2.5 MG/1
1 TABLET, FILM COATED ORAL
Qty: 60 | Refills: 0 | OUTPATIENT
Start: 2018-04-11 | End: 2018-05-10

## 2018-04-11 RX ADMIN — Medication 20 MILLIGRAM(S): at 05:31

## 2018-04-11 RX ADMIN — Medication 20 MILLIEQUIVALENT(S): at 07:45

## 2018-04-11 RX ADMIN — Medication 180 MILLIGRAM(S): at 08:49

## 2018-04-11 RX ADMIN — APIXABAN 2.5 MILLIGRAM(S): 2.5 TABLET, FILM COATED ORAL at 05:31

## 2018-04-11 NOTE — PROGRESS NOTE ADULT - SUBJECTIVE AND OBJECTIVE BOX
89 year old woman with history of afib on eliquis, CHF EF 50% with severe RA dilation and moderate LA dilation , HTN recently admitted to [facility] in February for afib with rapid ventricular response with chf exacerbation who presented to ED today with progressive weakness   hospital in [place] for pneumonia found to be in rapid afib 3 months ago now controlled with PO diltiazem, who presents for worsening LE edema, UE edema in the hands which has been progressively worsening since patient has been discharged. She was discharged on furosemide 20mg Qdaily and recently increased to 20mg BID by Dr. Kemp which according to her, "has not helped". She has not had orthopnea, has not had PND, has not had shortness of breath, chest pain, and has not noticed a decrease in her physical ability. +nausea, vomited once over the last few days.     Of note patient has had decrease appetite since discharge from [facility]. Diet consists of beans, rice    In the ED:  ICU Vital Signs Last 24 Hrs  T(C): 37.4 (07 Apr 2018 18:03), Max: 37.4 (07 Apr 2018 18:03)  T(F): 99.3 (07 Apr 2018 18:03), Max: 99.3 (07 Apr 2018 18:03)  HR: 86 (07 Apr 2018 17:24) (86 - 106)  BP: 140/66 (07 Apr 2018 17:24) (140/66 - 173/73)  BP(mean): 95 (07 Apr 2018 17:24) (95 - 116)  ABP: --  ABP(mean): --  RR: 18 (07 Apr 2018 17:24) (18 - 20)  SpO2: 95% (07 Apr 2018 17:24) (94% - 96%)    Received 40 IV lasix x1, Troponin negative x1, showed new ANNETTE and admitted to cardiac tele for CHF exacerbation with Bnp>2000 with EKG showing Atrial fibrillation with left axis deviation and LVH by modified andreea criteria. CXR with bilateral pleural effusions.         	  MEDICATIONS:  diltiazem    milliGRAM(s) Oral <User Schedule>  furosemide    Tablet 20 milliGRAM(s) Oral every 12 hours          bisacodyl 5 milliGRAM(s) Oral at bedtime  polyethylene glycol 3350 17 Gram(s) Oral daily PRN    atorvastatin 10 milliGRAM(s) Oral at bedtime    apixaban 2.5 milliGRAM(s) Oral every 12 hours      Complaint:     Otherwise 12 point review of systems is normal.    PHYSICAL EXAM:    General: NAD  Neck: Supple, NO JVD  Cardiac: S1 S2  Pulmonary: Crackles LLL  Abdomen: Soft, Non -tender, +BS x 4 quads  Extremities: +2-+3 BL LE edema,   Neuro: A/o x 3,             ICU Vital Signs Last 24 Hrs  T(C): 36.9 (11 Apr 2018 14:00), Max: 36.9 (11 Apr 2018 14:00)  T(F): 98.5 (11 Apr 2018 14:00), Max: 98.5 (11 Apr 2018 14:00)  HR: 72 (11 Apr 2018 13:09) (70 - 84)  BP: 118/67 (11 Apr 2018 13:09) (118/62 - 143/63)  BP(mean): 87 (11 Apr 2018 13:09) (86 - 101)  ABP: --  ABP(mean): --  RR: 18 (11 Apr 2018 13:09) (18 - 18)  SpO2: 91% (11 Apr 2018 13:09) (90% - 95%)          ECG:      CHEST X RAY    CT    MRI    MRA    CT ANGIO    CAROTID DUPLEX    DUPLEX     Echocardiogram    Catheterization:    Stress Test:     LABS:	 	  CARDIAC MARKERS:                              12.9   6.6   )-----------( 180      ( 11 Apr 2018 06:36 )             40.6     04-11    136  |  97  |  47<H>  ----------------------------<  101<H>  3.8   |  26  |  1.46<H>    Ca    8.4      11 Apr 2018 06:36  Mg     2.2     04-11        ASSESSMENT/PLAN: 	    89 y/o F PMHx afib (on eliquis), HFpEF 50% and HTN recently admitted to [facility] in Feb for afib RVR and CHF exacerbation presented to ED on 4/7 with progressive weakness, worsening LE edema and UE edema in the hands since discharge. Patient d/c'd on Lasix 20mg QD, which has been recently increased to 20 BID by Dr. Kemp without significant improvement. Of note, patient has had decreased appetite since discharge and her diet consists of rice and beans. In ED, VS: T 99.3 HR 86 /66 RR 18 SpO2 95% Received Lasix 40mg IV. Troponin (- x 1 ), BNP > 2000, EKG Afib with left axis deviation and LVH, CXR significant for bilateral pleural effusions, and patient admitted to [facility] for acute on chronic diastolic heart failure exacerbation. During hospitalization, patient diuresed with Lasix 40 mg IV BID, which has now been transitioned to PO Lasix 20mg BID. Of note, troponin peaked 0.02 likely 2/2 demand ischemia. ECHO performed with significance for LV hypertrophy, EF 50-55%, LA dilated, RA dilated, mild-mod MR, which is unchanged from ECHO in Feb 2018. On 4 chamber flow is noted near interatrial septum and cannot exclude septal defect, which patient will follow up with as outpatient Echo w/ Bubble study.  During hospitalization, HR 140s while ambulating for which Cardizem increased from 120 to 180 mg BID per Dr. Hardwick and has remained below 110 since. Hospital course c/b ANNETTE on CKD. On admission BUN/crea 32/1.84, on discharge in february 25/0.86, suspicion for decreased PO intake as patient urinating throughout hospitalization and likely up trending 2/2 diuresis w/ IV Lasix. Valsartan was held in the setting of ANNETTE and Eliquis was decreased from 5mg BID to 2.5 mg BID given her age and creatinine. ANNETTE now improved as BUN/creat now downtrending 47/1.46, and patient medically stable to be discharged to home with home PT. Patient also seen by RD while in patient on dietary instructions and education for appropriate heart failure diet while at home. Patient being discharged on Lasix 20mg BID, Eliquis 2.5mg BID, Cardizem  mg BID and Valsartan 160mg daily.

## 2018-04-13 DIAGNOSIS — I50.33 ACUTE ON CHRONIC DIASTOLIC (CONGESTIVE) HEART FAILURE: ICD-10-CM

## 2018-04-13 DIAGNOSIS — E44.0 MODERATE PROTEIN-CALORIE MALNUTRITION: ICD-10-CM

## 2018-04-13 DIAGNOSIS — Z79.01 LONG TERM (CURRENT) USE OF ANTICOAGULANTS: ICD-10-CM

## 2018-04-13 DIAGNOSIS — N17.9 ACUTE KIDNEY FAILURE, UNSPECIFIED: ICD-10-CM

## 2018-04-13 DIAGNOSIS — Z91.11 PATIENT'S NONCOMPLIANCE WITH DIETARY REGIMEN: ICD-10-CM

## 2018-04-13 DIAGNOSIS — I11.0 HYPERTENSIVE HEART DISEASE WITH HEART FAILURE: ICD-10-CM

## 2018-04-13 DIAGNOSIS — Z88.0 ALLERGY STATUS TO PENICILLIN: ICD-10-CM

## 2018-04-13 DIAGNOSIS — I48.2 CHRONIC ATRIAL FIBRILLATION: ICD-10-CM

## 2018-04-18 ENCOUNTER — APPOINTMENT (OUTPATIENT)
Dept: HEART AND VASCULAR | Facility: CLINIC | Age: 83
End: 2018-04-18
Payer: MEDICARE

## 2018-04-18 VITALS
BODY MASS INDEX: 34.02 KG/M2 | DIASTOLIC BLOOD PRESSURE: 72 MMHG | TEMPERATURE: 97.9 F | OXYGEN SATURATION: 96 % | WEIGHT: 192 LBS | HEIGHT: 62.99 IN | SYSTOLIC BLOOD PRESSURE: 136 MMHG | HEART RATE: 73 BPM

## 2018-04-18 DIAGNOSIS — I13.0 HYPERTENSIVE HEART AND CHRONIC KIDNEY DISEASE WITH HEART FAILURE AND STAGE 1 THROUGH STAGE 4 CHRONIC KIDNEY DISEASE, OR UNSPECIFIED CHRONIC KIDNEY DISEASE: ICD-10-CM

## 2018-04-18 DIAGNOSIS — N18.9 CHRONIC KIDNEY DISEASE, UNSPECIFIED: ICD-10-CM

## 2018-04-18 PROCEDURE — 99214 OFFICE O/P EST MOD 30 MIN: CPT | Mod: 25

## 2018-04-18 PROCEDURE — 93000 ELECTROCARDIOGRAM COMPLETE: CPT

## 2018-04-19 LAB
ANION GAP SERPL CALC-SCNC: 14 MMOL/L
BUN SERPL-MCNC: 37 MG/DL
CALCIUM SERPL-MCNC: 8.9 MG/DL
CHLORIDE SERPL-SCNC: 97 MMOL/L
CO2 SERPL-SCNC: 26 MMOL/L
CREAT SERPL-MCNC: 1.84 MG/DL
GLUCOSE SERPL-MCNC: 104 MG/DL
POTASSIUM SERPL-SCNC: 5.4 MMOL/L
SODIUM SERPL-SCNC: 137 MMOL/L

## 2018-04-23 ENCOUNTER — TRANSCRIPTION ENCOUNTER (OUTPATIENT)
Age: 83
End: 2018-04-23

## 2018-05-03 ENCOUNTER — APPOINTMENT (OUTPATIENT)
Dept: HEART AND VASCULAR | Facility: CLINIC | Age: 83
End: 2018-05-03
Payer: MEDICARE

## 2018-05-03 VITALS
WEIGHT: 201 LBS | DIASTOLIC BLOOD PRESSURE: 82 MMHG | OXYGEN SATURATION: 96 % | HEART RATE: 100 BPM | BODY MASS INDEX: 35.61 KG/M2 | SYSTOLIC BLOOD PRESSURE: 144 MMHG | TEMPERATURE: 97.9 F | HEIGHT: 62.99 IN

## 2018-05-03 DIAGNOSIS — Z78.9 OTHER SPECIFIED HEALTH STATUS: ICD-10-CM

## 2018-05-03 DIAGNOSIS — Z87.39 PERSONAL HISTORY OF OTHER DISEASES OF THE MUSCULOSKELETAL SYSTEM AND CONNECTIVE TISSUE: ICD-10-CM

## 2018-05-03 DIAGNOSIS — Z80.9 FAMILY HISTORY OF MALIGNANT NEOPLASM, UNSPECIFIED: ICD-10-CM

## 2018-05-03 DIAGNOSIS — Z86.39 PERSONAL HISTORY OF OTHER ENDOCRINE, NUTRITIONAL AND METABOLIC DISEASE: ICD-10-CM

## 2018-05-03 PROCEDURE — 99214 OFFICE O/P EST MOD 30 MIN: CPT

## 2018-05-03 PROCEDURE — 93000 ELECTROCARDIOGRAM COMPLETE: CPT

## 2018-05-03 RX ORDER — POTASSIUM CHLORIDE 1.5 G/1.58G
20 POWDER, FOR SOLUTION ORAL
Qty: 15 | Refills: 0 | Status: COMPLETED | COMMUNITY
Start: 2018-02-27 | End: 2018-05-03

## 2018-05-03 RX ORDER — FUROSEMIDE 20 MG/1
20 TABLET ORAL DAILY
Qty: 90 | Refills: 3 | Status: ACTIVE | COMMUNITY
Start: 2018-03-28 | End: 1900-01-01

## 2018-05-03 RX ORDER — APIXABAN 2.5 MG/1
2.5 TABLET, FILM COATED ORAL
Qty: 60 | Refills: 0 | Status: COMPLETED | COMMUNITY
Start: 2018-04-11 | End: 2018-05-03

## 2018-05-03 RX ORDER — VALSARTAN 160 MG/1
160 TABLET, COATED ORAL DAILY
Qty: 90 | Refills: 3 | Status: ACTIVE | COMMUNITY
Start: 2017-11-22 | End: 1900-01-01

## 2018-05-03 RX ORDER — ATENOLOL 50 MG/1
50 TABLET ORAL DAILY
Qty: 90 | Refills: 3 | Status: ACTIVE | COMMUNITY
Start: 2017-11-22

## 2018-05-03 RX ORDER — DILTIAZEM HYDROCHLORIDE 180 MG/1
180 CAPSULE, EXTENDED RELEASE ORAL
Qty: 30 | Refills: 0 | Status: COMPLETED | COMMUNITY
Start: 2017-12-14 | End: 2018-05-03

## 2018-05-03 RX ORDER — ATORVASTATIN CALCIUM 10 MG/1
10 TABLET, FILM COATED ORAL
Qty: 90 | Refills: 3 | Status: COMPLETED | COMMUNITY
Start: 2018-03-28 | End: 2018-05-03

## 2018-05-03 RX ORDER — DOXYCYCLINE HYCLATE 100 MG/1
100 CAPSULE ORAL
Qty: 10 | Refills: 0 | Status: COMPLETED | COMMUNITY
Start: 2017-12-02 | End: 2018-05-03

## 2018-05-03 RX ORDER — APIXABAN 5 MG/1
5 TABLET, FILM COATED ORAL
Qty: 60 | Refills: 0 | Status: COMPLETED | COMMUNITY
Start: 2018-01-02 | End: 2018-05-03

## 2018-05-03 RX ORDER — METOPROLOL TARTRATE 100 MG/1
100 TABLET, FILM COATED ORAL
Qty: 60 | Refills: 0 | Status: COMPLETED | COMMUNITY
Start: 2017-12-02 | End: 2018-05-03

## 2018-05-03 RX ORDER — DILTIAZEM HYDROCHLORIDE 120 MG/1
120 CAPSULE, EXTENDED RELEASE ORAL DAILY
Qty: 180 | Refills: 3 | Status: COMPLETED | COMMUNITY
Start: 2018-03-28 | End: 2018-05-03

## 2018-05-07 ENCOUNTER — APPOINTMENT (OUTPATIENT)
Dept: INTERNAL MEDICINE | Facility: CLINIC | Age: 83
End: 2018-05-07

## 2018-05-07 ENCOUNTER — APPOINTMENT (OUTPATIENT)
Dept: NEPHROLOGY | Facility: CLINIC | Age: 83
End: 2018-05-07
Payer: MEDICARE

## 2018-05-07 ENCOUNTER — INPATIENT (INPATIENT)
Facility: HOSPITAL | Age: 83
LOS: 9 days | Discharge: HOSPICE | DRG: 280 | End: 2018-05-17
Attending: INTERNAL MEDICINE | Admitting: INTERNAL MEDICINE
Payer: MEDICARE

## 2018-05-07 VITALS — SYSTOLIC BLOOD PRESSURE: 110 MMHG | DIASTOLIC BLOOD PRESSURE: 68 MMHG | HEART RATE: 93 BPM

## 2018-05-07 VITALS
DIASTOLIC BLOOD PRESSURE: 85 MMHG | TEMPERATURE: 98 F | WEIGHT: 203.05 LBS | OXYGEN SATURATION: 94 % | RESPIRATION RATE: 18 BRPM | HEART RATE: 92 BPM | SYSTOLIC BLOOD PRESSURE: 123 MMHG

## 2018-05-07 VITALS — DIASTOLIC BLOOD PRESSURE: 88 MMHG | SYSTOLIC BLOOD PRESSURE: 115 MMHG

## 2018-05-07 VITALS — WEIGHT: 203 LBS | BODY MASS INDEX: 35.97 KG/M2

## 2018-05-07 DIAGNOSIS — E88.09 OTHER DISORDERS OF PLASMA-PROTEIN METABOLISM, NOT ELSEWHERE CLASSIFIED: ICD-10-CM

## 2018-05-07 DIAGNOSIS — Z86.79 PERSONAL HISTORY OF OTHER DISEASES OF THE CIRCULATORY SYSTEM: ICD-10-CM

## 2018-05-07 DIAGNOSIS — I50.9 HEART FAILURE, UNSPECIFIED: ICD-10-CM

## 2018-05-07 DIAGNOSIS — R63.8 OTHER SYMPTOMS AND SIGNS CONCERNING FOOD AND FLUID INTAKE: ICD-10-CM

## 2018-05-07 DIAGNOSIS — I48.91 UNSPECIFIED ATRIAL FIBRILLATION: ICD-10-CM

## 2018-05-07 DIAGNOSIS — E87.70 FLUID OVERLOAD, UNSPECIFIED: ICD-10-CM

## 2018-05-07 DIAGNOSIS — E87.1 HYPO-OSMOLALITY AND HYPONATREMIA: ICD-10-CM

## 2018-05-07 DIAGNOSIS — Z02.9 ENCOUNTER FOR ADMINISTRATIVE EXAMINATIONS, UNSPECIFIED: ICD-10-CM

## 2018-05-07 DIAGNOSIS — R74.8 ABNORMAL LEVELS OF OTHER SERUM ENZYMES: ICD-10-CM

## 2018-05-07 DIAGNOSIS — N17.9 ACUTE KIDNEY FAILURE, UNSPECIFIED: ICD-10-CM

## 2018-05-07 DIAGNOSIS — I10 ESSENTIAL (PRIMARY) HYPERTENSION: ICD-10-CM

## 2018-05-07 DIAGNOSIS — Z29.8 ENCOUNTER FOR OTHER SPECIFIED PROPHYLACTIC MEASURES: ICD-10-CM

## 2018-05-07 DIAGNOSIS — N18.4 CHRONIC KIDNEY DISEASE, STAGE 4 (SEVERE): ICD-10-CM

## 2018-05-07 DIAGNOSIS — I48.2 CHRONIC ATRIAL FIBRILLATION: ICD-10-CM

## 2018-05-07 DIAGNOSIS — Z98.890 OTHER SPECIFIED POSTPROCEDURAL STATES: Chronic | ICD-10-CM

## 2018-05-07 LAB
ANION GAP SERPL CALC-SCNC: 14 MMOL/L — SIGNIFICANT CHANGE UP (ref 5–17)
BUN SERPL-MCNC: 40 MG/DL — HIGH (ref 7–23)
CALCIUM SERPL-MCNC: 8.4 MG/DL — SIGNIFICANT CHANGE UP (ref 8.4–10.5)
CHLORIDE SERPL-SCNC: 94 MMOL/L — LOW (ref 96–108)
CK MB CFR SERPL CALC: 22.8 NG/ML — HIGH (ref 0–6.7)
CK SERPL-CCNC: 226 U/L — HIGH (ref 25–170)
CO2 SERPL-SCNC: 25 MMOL/L — SIGNIFICANT CHANGE UP (ref 22–31)
CREAT SERPL-MCNC: 2.01 MG/DL — HIGH (ref 0.5–1.3)
GLUCOSE SERPL-MCNC: 143 MG/DL — HIGH (ref 70–99)
POTASSIUM SERPL-MCNC: 5.1 MMOL/L — SIGNIFICANT CHANGE UP (ref 3.5–5.3)
POTASSIUM SERPL-SCNC: 5.1 MMOL/L — SIGNIFICANT CHANGE UP (ref 3.5–5.3)
SODIUM SERPL-SCNC: 133 MMOL/L — LOW (ref 135–145)
TROPONIN T SERPL-MCNC: 0.41 NG/ML — CRITICAL HIGH (ref 0–0.01)

## 2018-05-07 PROCEDURE — 99205 OFFICE O/P NEW HI 60 MIN: CPT

## 2018-05-07 PROCEDURE — 93010 ELECTROCARDIOGRAM REPORT: CPT

## 2018-05-07 PROCEDURE — 99223 1ST HOSP IP/OBS HIGH 75: CPT | Mod: AI

## 2018-05-07 PROCEDURE — 71045 X-RAY EXAM CHEST 1 VIEW: CPT | Mod: 26

## 2018-05-07 PROCEDURE — 99285 EMERGENCY DEPT VISIT HI MDM: CPT | Mod: 25

## 2018-05-07 RX ORDER — CLOPIDOGREL BISULFATE 75 MG/1
300 TABLET, FILM COATED ORAL ONCE
Qty: 0 | Refills: 0 | Status: COMPLETED | OUTPATIENT
Start: 2018-05-07 | End: 2018-05-07

## 2018-05-07 RX ORDER — CLOPIDOGREL BISULFATE 75 MG/1
75 TABLET, FILM COATED ORAL DAILY
Qty: 0 | Refills: 0 | Status: DISCONTINUED | OUTPATIENT
Start: 2018-05-08 | End: 2018-05-17

## 2018-05-07 RX ORDER — HEPARIN SODIUM 5000 [USP'U]/ML
1000 INJECTION INTRAVENOUS; SUBCUTANEOUS
Qty: 25000 | Refills: 0 | Status: DISCONTINUED | OUTPATIENT
Start: 2018-05-07 | End: 2018-05-08

## 2018-05-07 RX ORDER — METOPROLOL TARTRATE 50 MG
12.5 TABLET ORAL
Qty: 0 | Refills: 0 | Status: DISCONTINUED | OUTPATIENT
Start: 2018-05-07 | End: 2018-05-08

## 2018-05-07 RX ORDER — ASPIRIN/CALCIUM CARB/MAGNESIUM 324 MG
81 TABLET ORAL DAILY
Qty: 0 | Refills: 0 | Status: DISCONTINUED | OUTPATIENT
Start: 2018-05-08 | End: 2018-05-17

## 2018-05-07 RX ORDER — ATORVASTATIN CALCIUM 80 MG/1
80 TABLET, FILM COATED ORAL AT BEDTIME
Qty: 0 | Refills: 0 | Status: DISCONTINUED | OUTPATIENT
Start: 2018-05-07 | End: 2018-05-12

## 2018-05-07 RX ORDER — FUROSEMIDE 40 MG
60 TABLET ORAL ONCE
Qty: 0 | Refills: 0 | Status: COMPLETED | OUTPATIENT
Start: 2018-05-07 | End: 2018-05-07

## 2018-05-07 RX ORDER — FUROSEMIDE 40 MG
40 TABLET ORAL
Qty: 0 | Refills: 0 | Status: DISCONTINUED | OUTPATIENT
Start: 2018-05-08 | End: 2018-05-08

## 2018-05-07 RX ORDER — ASPIRIN/CALCIUM CARB/MAGNESIUM 324 MG
325 TABLET ORAL ONCE
Qty: 0 | Refills: 0 | Status: COMPLETED | OUTPATIENT
Start: 2018-05-07 | End: 2018-05-07

## 2018-05-07 RX ADMIN — ATORVASTATIN CALCIUM 80 MILLIGRAM(S): 80 TABLET, FILM COATED ORAL at 22:21

## 2018-05-07 RX ADMIN — CLOPIDOGREL BISULFATE 300 MILLIGRAM(S): 75 TABLET, FILM COATED ORAL at 22:22

## 2018-05-07 RX ADMIN — Medication 60 MILLIGRAM(S): at 16:41

## 2018-05-07 RX ADMIN — HEPARIN SODIUM 10 UNIT(S)/HR: 5000 INJECTION INTRAVENOUS; SUBCUTANEOUS at 22:21

## 2018-05-07 RX ADMIN — Medication 325 MILLIGRAM(S): at 22:21

## 2018-05-07 NOTE — H&P ADULT - NSHPPHYSICALEXAM_GEN_ALL_CORE
.  VITAL SIGNS:  T(C): 36.8 (05-07-18 @ 18:30), Max: 36.8 (05-07-18 @ 18:30)  T(F): 98.3 (05-07-18 @ 18:30), Max: 98.3 (05-07-18 @ 18:30)  HR: 92 (05-07-18 @ 21:00) (85 - 98)  BP: 119/79 (05-07-18 @ 21:00) (119/79 - 172/81)  BP(mean): 100 (05-07-18 @ 21:00) (100 - 100)  RR: 27 (05-07-18 @ 21:00) (18 - 27)  SpO2: 95% (05-07-18 @ 21:00) (94% - 97%)  Wt(kg): --    PHYSICAL EXAM:    Constitutional: WDWN resting comfortably in bed; NAD  Head: NC/AT  Eyes: PERRL, EOMI, anicteric sclera  ENT: no nasal discharge; uvula midline, no oropharyngeal erythema or exudates; MMM  Neck: supple; +JVD  Respiratory: Fine inspiratory crackles to mid-lung field b/l  Cardiac: +S1/S2; RRR; no M/R/G; PMI non-displaced  Gastrointestinal: Obese, distended, dull to percussion, mild epigastric pain, no suprapubic tenderness, no radiation or rebound  Back: spine midline, no bony tenderness or step-offs; no CVAT B/L  Extremities: WWP, 4+ pitting edema of b/l LE up to abdomen  Musculoskeletal: NROM x4; no joint swelling, tenderness or erythema  Dermatologic: skin warm, dry and intact; no rashes, wounds, or scars  Lymphatic: no submandibular or cervical LAD  Neurologic: AAOx3; CNII-XII grossly intact; no focal deficits  Psychiatric: affect and characteristics of appearance, verbalizations, behaviors are appropriate

## 2018-05-07 NOTE — H&P ADULT - HISTORY OF PRESENT ILLNESS
Patient is a 89yo F with a PMHx of A fib on Eliquis, HFpEF 50%, HTN with multiple recent admissions to [facility] for A fib w/RVR and CHF exacerbation who presents with progressive dyspnea on exertion and shortness of breath for ..............    In the ED, VS T 97.5, HR 92, /85, R 18, SpO2 94% on room air. Labs without leukocytosis or anemia, normal coags, hyponatremia to 130, Cr of 1.98 (baseline 1.4), albumin of 2.1, CKMB 7.6, Troponin 0.06, BNP 9464. EKG showing rate controlled atrial fibrillation with a LBBB unchanged from prior studies, CXR with pulmonary vascular congestion. Patient given 60mg IV Lasix and admitted to [facility] for management of acute on chronic CHF exacerbation. Patient is a 89yo F with a PMHx of A fib on Eliquis, HFpEF 50%, HTN with multiple recent admissions to [facility] for A fib w/RVR and CHF exacerbation who presents with progressive dyspnea on exertion and shortness of breath. Patient states that since she was hospitalized in April her lower extremity edema has been worse and her exercise tolerance has decreased to the point she will only ambulate to the bathroom with the aide of a cane, otherwise opting to stay in bed. Patient also reports decreasing urinary output over the last month despite increase in her home dose of Lasix to 40mg BID. Patient endorses that she sleeps with her back propped up and is unsure if she can lay flat without orthopnea. She states she has occasional chest pressure particularly when ambulating associated with shortness of breath, relieved by rest. Patient currently denies chest pain or palpitations, but endorses constant epigastric pain and decreased PO intake over the past few weeks. Denies fevers, chills, cough, dysuria, vomiting, diarrhea. Currently breathing comfortably on nasal cannula with lower extremity edema.    In the ED, VS T 97.5, HR 92, /85, R 18, SpO2 94% on room air. Labs without leukocytosis or anemia, normal coags, hyponatremia to 130, Cr of 1.98 (baseline 1.4), albumin of 2.1, CKMB 7.6, Troponin 0.06, BNP 9464. EKG showing rate controlled atrial fibrillation with a LBBB unchanged from prior studies, CXR with pulmonary vascular congestion. Patient given 60mg IV Lasix and admitted to [facility] for management of acute on chronic CHF exacerbation.

## 2018-05-07 NOTE — H&P ADULT - PROBLEM SELECTOR PLAN 7
Patient with albumin of 2.1 on admission with reports of nausea, decreased PO intake for the past few weeks, likely related to fluid overload and poor PO intake.  - Trend CMP  - Nutrition consult in AM

## 2018-05-07 NOTE — ED PROVIDER NOTE - MEDICAL DECISION MAKING DETAILS
pt with chf, BACK, leg swelling, worsening renal function sent in for admission for diuresis, labs show worsening renal function, elevated bnp, cxr with fluids - iv lasix in ED and discussed with Denis who agrees on admission for diuresis

## 2018-05-07 NOTE — H&P ADULT - ATTENDING COMMENTS
Assessment: Patient personally seen and examined myself during rounds with the Physician Assistant/House Staff/Nurse Practitioner   ON DATE 5/7/18  Physician Assistant/House Staff/Nurse Practitioner note read, including vitals, physical findings, laboratory data, and radiological reports.   Revisions included below.   Direct personal management at bed side and extensive interpretation of the data.    Plan was outlined and discussed in details with the Physician Assistant/House Staff/Nurse Practitioner.    Decision making of high complexity   Risk high of complications, morbidity, and/or mortality  Assessment and Action taken for acute disease activity to reflect the level of care provided:  -Hemodynamic evaluation and support - volume overload,   -ACS assessment and treatment as applicable - chest pain, unstable angina  -Heart failure assessment and treatment as applicable - decompensation  -Cardiac Telemetry reviewed - no arrythmia  -Medication reconciliation  -Review laboratory data  -EKG reviewed - no stmei  -Echo pending  -Interdisciplinary discussion with IC / EP / HF / CTS teams as needed  TIME SPENT in evaluation and management, reassessments, review and interpretation of labs and x-rays, and hemodynamic management, formulating a plan and coordinating care: ___70____ MIN.  Time does not include procedural time.

## 2018-05-07 NOTE — H&P ADULT - PROBLEM SELECTOR PLAN 5
Patient with history of atrial fibrillation, previously on Cardizem and Eliquis, now on Atenolol for rate control. Patient's Eliquis was discontinued though family does not know why. ADR listed to Metoprolol however family states she "got loopy".  - c/w Metoprolol 12.5 BID for low dose beta blockade for rate control given acute on chronic CHF presentation

## 2018-05-07 NOTE — H&P ADULT - PROBLEM SELECTOR PLAN 2
Patient with elevated Troponin on admission to 0.06, complaining of occasional intermittent exertional chest tightness which relieves with rest. No current chest pain, only complaining of epigastric pain.  - Trend cardiac enzymes to peak  - If rising concern that epigastric pain atypical presentation of NSTEMI - will Aspirin and Plavix load and start Heparin per ACS protocol  - Repeat EKG in AM  - ECHO in AM

## 2018-05-07 NOTE — ED ADULT NURSE NOTE - OBJECTIVE STATEMENT
Pt sent in by PCP, c/o worsening LE edema, SOB and chest tightness on exertion and with movement. Pt denies pain, CP, dizziness. 4+ pitting edema bilat LE. RLL crackles. Pt placed on cardiac monitor, will maintain safety. Daughter at bedside.

## 2018-05-07 NOTE — H&P ADULT - PROBLEM SELECTOR PLAN 8
DASH/TLC fluid restricted diet  Nutrition consult as above given hypoalbuminemia and decreased appetite  No IVF at this time  Replete electrolytes PRN to K > 4, Mg > 2

## 2018-05-07 NOTE — H&P ADULT - PROBLEM SELECTOR PLAN 3
Patient presenting with Cr of 1.90, baseline of 1.4 earlier this year though in prior records as low as 0.8. Likely element of CKD as well.  - Obtain urine electrolytes  - Renal sono  - Trend BMP

## 2018-05-07 NOTE — H&P ADULT - PROBLEM SELECTOR PLAN 6
Patient with history of HTN on Atenolol, Norvasc, Losartan at home  - Currently normotensive, holding BP medications  - Add back as needed/tolerated

## 2018-05-07 NOTE — H&P ADULT - ASSESSMENT
89yo F with a PMHx of A fib on Eliquis, HFpEF 50%, and HTN who presents with progressive dyspnea on exertion suggestive of an acute on chronic CHF exacerbation

## 2018-05-07 NOTE — H&P ADULT - PROBLEM SELECTOR PLAN 4
Patient with hyponatremia on admission to 130, ECF volume increased. Pending urine studies, though likely due to acute on chronic CHF exacerbation.  - f/u Serum Osm, Urine Osm, Urine electrolytes  - Q6H BMP to ensure not overcorrecting  - Patient known to Dr. Martinez, will touch base with renal in AM

## 2018-05-07 NOTE — H&P ADULT - NSHPLABSRESULTS_GEN_ALL_CORE
.  LABS:                         13.6   7.6   )-----------( 245      ( 07 May 2018 16:40 )             41.9     05-07    130<L>  |  94<L>  |  38<H>  ----------------------------<  114<H>  5.3   |  27  |  1.90<H>    Ca    8.2<L>      07 May 2018 16:40    TPro  6.5  /  Alb  2.1<L>  /  TBili  0.3  /  DBili  x   /  AST  32  /  ALT  15  /  AlkPhos  100  05-07    PT/INR - ( 07 May 2018 16:40 )   PT: 11.6 sec;   INR: 1.04          PTT - ( 07 May 2018 16:40 )  PTT:30.6 sec    CARDIAC MARKERS ( 07 May 2018 16:40 )  x     / 0.06 ng/mL / 127 U/L / x     / 7.6 ng/mL      Serum Pro-Brain Natriuretic Peptide: 9464 pg/mL (05-07 @ 16:40)        RADIOLOGY, EKG & ADDITIONAL TESTS: Reviewed.

## 2018-05-07 NOTE — H&P ADULT - PROBLEM SELECTOR PLAN 1
Patient with history of HFpEF presenting with worsening exertional dyspnea, decreased exercise tolerance, orthopnea, clinical signs of overload with pulmonary vascular congestion on CXR, elevated cardiac enzymes and elevated BNP suggestive of acute on chronic CHF exacerbation. Patient given Lasix 60mg IV in ED with minimal urine output recorded thus far.   - Strict I/Os with daily weights - minimal urine on bladder scan but may be limited due to body habitus - if UOP still low will insert Schmitz for accurate I/Os  - Continue with Lasix 40mg IV BID for diuresis  - Patient refusing BiPAP/CPAP despite discussion of Risk/Benefit  - Decreased home Atenolol to Metoprolol 12.5 BID  - EKG in AM  - ECHO in AM

## 2018-05-07 NOTE — PROGRESS NOTE ADULT - SUBJECTIVE AND OBJECTIVE BOX
Patient is a 89yo F with a PMHx of A fib on Eliquis, HFpEF 50%, HTN with multiple recent admissions to [facility] for A fib w/RVR and CHF exacerbation who presents with progressive dyspnea on exertion and shortness of breath for ..............    In the ED, VS T 97.5, HR 92, /85, R 18, SpO2 94% on room air. Labs without leukocytosis or anemia, normal coags, hyponatremia to 130, Cr of 1.98 (baseline 1.4), albumin of 2.1, CKMB 7.6, Troponin 0.06, BNP 9464. EKG showing rate controlled atrial fibrillation with a LBBB unchanged from prior studies, CXR with pulmonary vascular congestion. Patient given 60mg IV Lasix and admitted to [facility] for management of acute on chronic CHF exacerbation.     Past Medical History:  Atrial fibrillation    CHF (congestive heart failure)    HTN (hypertension).    Past Surgical History:  H/O right knee surgery.        	  MEDICATIONS:                          Complaint:     Otherwise 12 point review of systems is normal.    PHYSICAL EXAM:    Constitutional:   Eyes: PERRL, EOMI, sclera non-icteric  Neck: supple, no masses, no JVD  Respiratory: CTA b/l, good air entry b/l, no wheezing, rhonchi, rales, with normal respiratory effort and no intercostal retractions  Cardiovascular: RRR, normal S1S2, no M/R/G  Gastrointestinal: soft, NTND, no masses palpable, BS normal in all four quadrants,   Extremities:  no c/c/e  Neurological: AAOx3    ICU Vital Signs Last 24 Hrs  T(C): 36.8 (07 May 2018 18:30), Max: 36.8 (07 May 2018 18:30)  T(F): 98.3 (07 May 2018 18:30), Max: 98.3 (07 May 2018 18:30)  HR: 92 (07 May 2018 21:00) (85 - 98)  BP: 119/79 (07 May 2018 21:00) (119/79 - 172/81)  BP(mean): 100 (07 May 2018 21:00) (100 - 100)  ABP: --  ABP(mean): --  RR: 27 (07 May 2018 21:00) (18 - 27)  SpO2: 95% (07 May 2018 21:00) (94% - 97%)          ECG:      CHEST X RAY    CT    MRI    MRA    CT ANGIO    CAROTID DUPLEX    DUPLEX     Echocardiogram    Catheterization:    Stress Test:     LABS:	 	  CARDIAC MARKERS:  Troponin T, Serum: 0.41 ng/mL <HH> [0.00 - 0.01] (05-07 @ 20:21)  Troponin T, Serum: 0.06 ng/mL <HH> [0.00 - 0.01] (05-07 @ 16:40)                              13.6   7.6   )-----------( 245      ( 07 May 2018 16:40 )             41.9     05-07    130<L>  |  94<L>  |  38<H>  ----------------------------<  114<H>  5.3   |  27  |  1.90<H>    Ca    8.2<L>      07 May 2018 16:40    TPro  6.5  /  Alb  2.1<L>  /  TBili  0.3  /  DBili  x   /  AST  32  /  ALT  15  /  AlkPhos  100  05-07    proBNP: Serum Pro-Brain Natriuretic Peptide: 9464 pg/mL (05-07 @ 16:40)          ASSESSMENT/PLAN: 	  89yo F with a PMHx of A fib on Eliquis, HFpEF 50%, and HTN who presents with progressive dyspnea on exertion suggestive of an acute on chronic CHF exacerbation     Problem/Plan - 1:  ·  Problem: CHF (congestive heart failure).     Problem/Plan - 2:  ·  Problem: Cardiac enzymes elevated.     Problem/Plan - 3:  ·  Problem: ANNETTE (acute kidney injury).     Problem/Plan - 4:  ·  Problem: Hyponatremia.     Problem/Plan - 5:  ·  Problem: Atrial fibrillation.     Problem/Plan - 6:  Problem: HTN (hypertension).    Problem/Plan - 7:  ·  Problem: Hypoalbuminemia.    Problem/Plan - 8:  ·  Problem: Nutrition, metabolism, and development symptoms.    Problem/Plan - 9:  ·  Problem: Need for other prophylactic measure.    Problem/Plan - 10:  Problem: Discharge planning issues.

## 2018-05-07 NOTE — ED ADULT TRIAGE NOTE - CHIEF COMPLAINT QUOTE
went to new PCP and sent for swelling of legs (pitting 4 + toes to knees) ; SOB, crackle RLL) intermittent chest tightness, nausea, abdominal distension and constipation - LBM - small , hard yesterday --

## 2018-05-07 NOTE — ED PROVIDER NOTE - OBJECTIVE STATEMENT
89 y/o f with pmh of chf, atrial fib, htn presents to ED with increasing leg swelling and shortness of breath.  Seen by Dr. Martinez today and referred to ED for further evaluation and admission.  Pt with similar admissions in past two months for fluid overload requiring diuresis.  Pt has been taking lasix 40mg bid.  Denies chest pain, fever, cough.

## 2018-05-08 DIAGNOSIS — I21.4 NON-ST ELEVATION (NSTEMI) MYOCARDIAL INFARCTION: ICD-10-CM

## 2018-05-08 LAB
ALBUMIN SERPL ELPH-MCNC: 2.1 G/DL — LOW (ref 3.3–5)
ALP SERPL-CCNC: 97 U/L — SIGNIFICANT CHANGE UP (ref 40–120)
ALT FLD-CCNC: 16 U/L — SIGNIFICANT CHANGE UP (ref 10–45)
ANION GAP SERPL CALC-SCNC: 10 MMOL/L — SIGNIFICANT CHANGE UP (ref 5–17)
APTT BLD: 157.3 SEC — CRITICAL HIGH (ref 27.5–37.4)
APTT BLD: 50.3 SEC — HIGH (ref 27.5–37.4)
APTT BLD: 91.9 SEC — HIGH (ref 27.5–37.4)
AST SERPL-CCNC: 46 U/L — HIGH (ref 10–40)
BILIRUB SERPL-MCNC: 0.2 MG/DL — SIGNIFICANT CHANGE UP (ref 0.2–1.2)
BUN SERPL-MCNC: 45 MG/DL — HIGH (ref 7–23)
CALCIUM SERPL-MCNC: 8 MG/DL — LOW (ref 8.4–10.5)
CHLORIDE SERPL-SCNC: 95 MMOL/L — LOW (ref 96–108)
CK MB CFR SERPL CALC: 32.9 NG/ML — HIGH (ref 0–6.7)
CK MB CFR SERPL CALC: 35.4 NG/ML — HIGH (ref 0–6.7)
CK SERPL-CCNC: 249 U/L — HIGH (ref 25–170)
CK SERPL-CCNC: 273 U/L — HIGH (ref 25–170)
CO2 SERPL-SCNC: 27 MMOL/L — SIGNIFICANT CHANGE UP (ref 22–31)
CREAT SERPL-MCNC: 2.14 MG/DL — HIGH (ref 0.5–1.3)
GLUCOSE SERPL-MCNC: 109 MG/DL — HIGH (ref 70–99)
HCT VFR BLD CALC: 38.4 % — SIGNIFICANT CHANGE UP (ref 34.5–45)
HGB BLD-MCNC: 12.5 G/DL — SIGNIFICANT CHANGE UP (ref 11.5–15.5)
MAGNESIUM SERPL-MCNC: 2.2 MG/DL — SIGNIFICANT CHANGE UP (ref 1.6–2.6)
MCHC RBC-ENTMCNC: 30.8 PG — SIGNIFICANT CHANGE UP (ref 27–34)
MCHC RBC-ENTMCNC: 32.6 G/DL — SIGNIFICANT CHANGE UP (ref 32–36)
MCV RBC AUTO: 94.6 FL — SIGNIFICANT CHANGE UP (ref 80–100)
OSMOLALITY SERPL: 292 MOSM/KG — SIGNIFICANT CHANGE UP (ref 280–301)
PHOSPHATE SERPL-MCNC: 5.2 MG/DL — HIGH (ref 2.5–4.5)
PLATELET # BLD AUTO: 228 K/UL — SIGNIFICANT CHANGE UP (ref 150–400)
POTASSIUM SERPL-MCNC: 4.3 MMOL/L — SIGNIFICANT CHANGE UP (ref 3.5–5.3)
POTASSIUM SERPL-SCNC: 4.3 MMOL/L — SIGNIFICANT CHANGE UP (ref 3.5–5.3)
PROT SERPL-MCNC: 6 G/DL — SIGNIFICANT CHANGE UP (ref 6–8.3)
RBC # BLD: 4.06 M/UL — SIGNIFICANT CHANGE UP (ref 3.8–5.2)
RBC # FLD: 13.8 % — SIGNIFICANT CHANGE UP (ref 10.3–16.9)
SODIUM SERPL-SCNC: 132 MMOL/L — LOW (ref 135–145)
TROPONIN T SERPL-MCNC: 0.7 NG/ML — CRITICAL HIGH (ref 0–0.01)
TROPONIN T SERPL-MCNC: 0.78 NG/ML — CRITICAL HIGH (ref 0–0.01)
WBC # BLD: 7.9 K/UL — SIGNIFICANT CHANGE UP (ref 3.8–10.5)
WBC # FLD AUTO: 7.9 K/UL — SIGNIFICANT CHANGE UP (ref 3.8–10.5)

## 2018-05-08 PROCEDURE — 99223 1ST HOSP IP/OBS HIGH 75: CPT

## 2018-05-08 PROCEDURE — 71045 X-RAY EXAM CHEST 1 VIEW: CPT | Mod: 26

## 2018-05-08 PROCEDURE — 93306 TTE W/DOPPLER COMPLETE: CPT | Mod: 26

## 2018-05-08 PROCEDURE — 93010 ELECTROCARDIOGRAM REPORT: CPT

## 2018-05-08 PROCEDURE — 93010 ELECTROCARDIOGRAM REPORT: CPT | Mod: 77

## 2018-05-08 PROCEDURE — 99233 SBSQ HOSP IP/OBS HIGH 50: CPT

## 2018-05-08 RX ORDER — HEPARIN SODIUM 5000 [USP'U]/ML
1200 INJECTION INTRAVENOUS; SUBCUTANEOUS
Qty: 25000 | Refills: 0 | Status: DISCONTINUED | OUTPATIENT
Start: 2018-05-08 | End: 2018-05-08

## 2018-05-08 RX ORDER — HEPARIN SODIUM 5000 [USP'U]/ML
1000 INJECTION INTRAVENOUS; SUBCUTANEOUS
Qty: 25000 | Refills: 0 | Status: DISCONTINUED | OUTPATIENT
Start: 2018-05-08 | End: 2018-05-08

## 2018-05-08 RX ORDER — FUROSEMIDE 40 MG
60 TABLET ORAL EVERY 12 HOURS
Qty: 0 | Refills: 0 | Status: DISCONTINUED | OUTPATIENT
Start: 2018-05-08 | End: 2018-05-09

## 2018-05-08 RX ORDER — FUROSEMIDE 40 MG
20 TABLET ORAL ONCE
Qty: 0 | Refills: 0 | Status: COMPLETED | OUTPATIENT
Start: 2018-05-08 | End: 2018-05-08

## 2018-05-08 RX ORDER — ATENOLOL 25 MG/1
50 TABLET ORAL DAILY
Qty: 0 | Refills: 0 | Status: DISCONTINUED | OUTPATIENT
Start: 2018-05-08 | End: 2018-05-09

## 2018-05-08 RX ORDER — HEPARIN SODIUM 5000 [USP'U]/ML
800 INJECTION INTRAVENOUS; SUBCUTANEOUS
Qty: 25000 | Refills: 0 | Status: DISCONTINUED | OUTPATIENT
Start: 2018-05-08 | End: 2018-05-09

## 2018-05-08 RX ADMIN — Medication 60 MILLIGRAM(S): at 18:11

## 2018-05-08 RX ADMIN — Medication 40 MILLIGRAM(S): at 06:38

## 2018-05-08 RX ADMIN — ATORVASTATIN CALCIUM 80 MILLIGRAM(S): 80 TABLET, FILM COATED ORAL at 21:40

## 2018-05-08 RX ADMIN — Medication 81 MILLIGRAM(S): at 10:21

## 2018-05-08 RX ADMIN — HEPARIN SODIUM 8 UNIT(S)/HR: 5000 INJECTION INTRAVENOUS; SUBCUTANEOUS at 21:39

## 2018-05-08 RX ADMIN — ATENOLOL 50 MILLIGRAM(S): 25 TABLET ORAL at 18:11

## 2018-05-08 RX ADMIN — Medication 20 MILLIGRAM(S): at 10:21

## 2018-05-08 RX ADMIN — Medication 12.5 MILLIGRAM(S): at 06:37

## 2018-05-08 NOTE — PROGRESS NOTE ADULT - PROBLEM SELECTOR PLAN 5
Patient with history of atrial fibrillation, previously on Cardizem and Eliquis, now on Atenolol for rate control. Patient's Eliquis was discontinued though family does not know why. ADR listed to Metoprolol however family states she "got loopy".  - c/w Metoprolol 12.5 BID for low dose beta blockade for rate control given acute on chronic CHF presentation Patient with history of atrial fibrillation, previously on Cardizem and Eliquis, now on Atenolol for rate control. Patient's Eliquis was discontinued though family does not know why. ADR listed to Metoprolol however family states she "got loopy".  - C/w HD Atenolol 50mg qd per Dr Kemp, previously restarted as outpt as pt did not tolerate other BB/CCB  -Pt taken off Eliquis per Dr Kemp as outpt as pt c/o GI upset.  -c/w ASA 81mg qd Patient with history of Afib, previously on Cardizem and Eliquis, now on Atenolol for rate control. Patient's Eliquis was discontinued as outpt and placed on ASA. ADR listed to Metoprolol however family states she "got loopy".  - C/w HD Atenolol 50mg qd per Dr Kemp, previously restarted as outpt as pt did not tolerate other BB/CCB  -Pt taken off Eliquis per Dr Kemp as outpt per family request  -c/w ASA 81mg qd

## 2018-05-08 NOTE — DIETITIAN INITIAL EVALUATION ADULT. - OTHER INFO
89yo F with a PMHx of A fib on Eliquis, HFpEF 50%, and HTN p/w progressive dyspnea on exertion, b/l LE edema. Admitted for acute on chronic diastolic CHF exacerbation. Pt seen OOB in chair. Currently on a DASH/TLC diet and tolerating PO. Endorses fair appetite, did not have breakfast and ate >75% lunch. Pt reports PTA having fish, leafy greens and chicken. Understanding of which foods are high in Na and saturated fats. RD provided further edu on label reading and meal planning. Reports UBW is 181lbs and attributes wt gain to fluid retention in legs (current BW is 204lbs). NKFA or dietary restrictions. Skin and GI WDL per flowsheet.

## 2018-05-08 NOTE — DIETITIAN INITIAL EVALUATION ADULT. - ENERGY NEEDS
Height: 5'3" Weight: 204lbs, IBW 115lbs+/-10%, %%, BMI 36  IBW used for calculations as pt >120% of IBW   Nutrient needs based on [facility] standards of care for maintenance in older adults.  Fluids per MD 2/2 CHF

## 2018-05-08 NOTE — PROGRESS NOTE ADULT - PROBLEM SELECTOR PLAN 8
DASH/TLC fluid restricted diet  Nutrition consult as above given hypoalbuminemia and decreased appetite  No IVF at this time  Replete electrolytes PRN to K > 4, Mg > 2 DASH/TLC diet, 1L fluid restriction  Nutrition consult as above given hypoalbuminemia and decreased appetite  No IVF at this time  Replete electrolytes PRN to K > 4, Mg > 2

## 2018-05-08 NOTE — PROGRESS NOTE ADULT - ASSESSMENT
89yo F with a PMHx of A fib on Eliquis, HFpEF 50%, and HTN who presents with progressive dyspnea on exertion, lazarus LE edema. Admitted to tele for acute on chronic diastolic CHF exacerbation. 89yo F with a PMHx of A fib on Eliquis, HFpEF 50%, and HTN who presents with progressive dyspnea on exertion, lazarus LE edema. Admitted to tele for acute on chronic CHF exacerbation.

## 2018-05-08 NOTE — PROGRESS NOTE ADULT - PROBLEM SELECTOR PLAN 4
Patient with hyponatremia on admission to 130, ECF volume increased. Pending urine studies, though likely due to acute on chronic CHF exacerbation.  - f/u Serum Osm, Urine Osm, Urine electrolytes  - Q6H BMP to ensure not overcorrecting  - Patient known to Dr. Martinez, will touch base with renal in AM Patient with hyponatremia on admission to 130, ECF volume increased. Pending urine studies, though likely due to acute on chronic CHF exacerbation, hypervolemic hyponatremia.  -Na 132 today  - f/u Serum Osm, Urine Osm, Urine electrolytes  - Patient known to Dr. Martinez. F/u renal recs in AM

## 2018-05-08 NOTE — PROGRESS NOTE ADULT - SUBJECTIVE AND OBJECTIVE BOX
Patient is a 89yo F with a PMHx of A fib on Eliquis, HFpEF 50%, HTN with multiple recent admissions to [facility] for A fib w/RVR and CHF exacerbation who presents with progressive dyspnea on exertion and shortness of breath for ..............    In the ED, VS T 97.5, HR 92, /85, R 18, SpO2 94% on room air. Labs without leukocytosis or anemia, normal coags, hyponatremia to 130, Cr of 1.98 (baseline 1.4), albumin of 2.1, CKMB 7.6, Troponin 0.06, BNP 9464. EKG showing rate controlled atrial fibrillation with a LBBB unchanged from prior studies, CXR with pulmonary vascular congestion. Patient given 60mg IV Lasix and admitted to [facility] for management of acute on chronic CHF exacerbation.     Past Medical History:  Atrial fibrillation    CHF (congestive heart failure)    HTN (hypertension).    Past Surgical History:  H/O right knee surgery.           Pt seen and examined at bedside on 5[facility]    Trop elevated to 0.78, received ASA/Plavix load, started Heparin drip, Lipitor for NSTEMI.    TELEMETRY: Afib 70-80s    EKG: Afib 70s w/ LBBB (previous EKG w/ incomplete LBBB)    < from: Xray Chest 1 View- PORTABLE-Routine (05.08.18 @ 07:08) >    Findings: Near resolution pulmonary vascular congestion. There are again   small bilateral pleural effusions with bibasilar atelectasis.    Impression: Improved congestion.        < end of copied text >        PHYSICAL EXAM:    General: NAD  Neck: Supple, NO JVD  Cardiac: S1 S2, No M/R/G  Pulmonary: Lungs w/ bibasilar crackles, Breathing unlabored on RA, No Rhonchi/Rales/Wheezing  Abdomen: Soft, Non-tender, +BS x 4 quads  Extremities: No Rashes, 3+ lazarus LE edema extending up thighs 1+  Neuro: AAOx3          LABS:                          12.5   7.9   )-----------( 228      ( 08 May 2018 02:28 )             38.4                              05-08    132<L>  |  95<L>  |  45<H>  ----------------------------<  109<H>  4.3   |  27  |  2.14<H>    Ca    8.0<L>      08 May 2018 02:28  Phos  5.2     05-08  Mg     2.2     05-08    TPro  6.0  /  Alb  2.1<L>  /  TBili  0.2  /  DBili  x   /  AST  46<H>  /  ALT  16  /  AlkPhos  97  05-08    LIVER FUNCTIONS - ( 08 May 2018 02:28 )  Alb: 2.1 g/dL / Pro: 6.0 g/dL / ALK PHOS: 97 U/L / ALT: 16 U/L / AST: 46 U/L / GGT: x         PT/INR - ( 07 May 2018 16:40 )   PT: 11.6 sec;   INR: 1.04          PTT - ( 08 May 2018 02:28 )  PTT:50.3 sec  CAPILLARY BLOOD GLUCOSE        CARDIAC MARKERS ( 08 May 2018 02:28 )  x     / 0.78 ng/mL / 273 U/L / x     / 35.4 ng/mL  CARDIAC MARKERS ( 07 May 2018 20:21 )  x     / 0.41 ng/mL / 226 U/L / x     / 22.8 ng/mL  CARDIAC MARKERS ( 07 May 2018 16:40 )  x     / 0.06 ng/mL / 127 U/L / x     / 7.6 ng/mL          Allergies:  metoprolol (Unknown)  penicillin (Rash)    MEDICATIONS  (STANDING):  aspirin  chewable 81 milliGRAM(s) Oral daily  atorvastatin 80 milliGRAM(s) Oral at bedtime  clopidogrel Tablet 75 milliGRAM(s) Oral daily  furosemide   Injectable 40 milliGRAM(s) IV Push two times a day  heparin  Infusion 1200 Unit(s)/Hr (12 mL/Hr) IV Continuous <Continuous>  metoprolol tartrate 12.5 milliGRAM(s) Oral two times a day    MEDICATIONS  (PRN):      Echo w/ Bubble 5/8/18: There is moderate concentric left ventricular hypertrophy.There is moderate global hypokinesis of the left ventricle.The left ventricular ejection fraction is estimated to be 35-40%The left atrium is mildly dilated.The right atrium is dilated.There is mild to moderate aortic valve thickening.No aortic regurgitation noted.No hemodynamically significant valvular aortic stenosis.There is moderate mitral valve thickening.There is trace mitral regurgitation. Structurally normal tricuspid valve.There is mild tricuspid regurgitation. There is mild pulmonary hypertension.The pulmonary artery systolic pressure is estimated to be 42 mmHg.No aortic root dilatation.There is no pericardial effusion.

## 2018-05-08 NOTE — PROGRESS NOTE ADULT - PROBLEM SELECTOR PLAN 1
Patient with history of HFpEF presenting with worsening exertional dyspnea, decreased exercise tolerance, orthopnea, clinical signs of overload with pulmonary vascular congestion on CXR, elevated cardiac enzymes and elevated BNP suggestive of acute on chronic CHF exacerbation. Patient given Lasix 60mg IV in ED with minimal urine output recorded thus far.   - Strict I/Os with daily weights - minimal urine on bladder scan but may be limited due to body habitus - if UOP still low will insert Schmitz for accurate I/Os  - Continue with Lasix 40mg IV BID for diuresis  - Patient refusing BiPAP/CPAP despite discussion of Risk/Benefit  - Decreased home Atenolol to Metoprolol 12.5 BID  - EKG in AM  - ECHO in AM Pt w/ Hx of HFpEF (EF 50-55% on echo 4/2018) presenting with worsening exertional dyspnea, decreased exercise tolerance, orthopnea, clinical signs of overload. CXR w/ pulmonary vascular congestion, elevated cardiac enzymes 0.78, BNP 9464. Acute on chronic CHF exacerbation likely 2/2 Lasix failure vs ischemia. Patient given Lasix 60mg IV in ED with minimal urine output   - Pending ECHO today  - Increase Lasix 40mg to 60m IV BID for diuresis, net neg 850cc this AM  - C/w HD Atenolol 50mg qd per Dr Kemp, previously restarted as outpt as pt did not tolerate other BB/CCB  - Outpt valsartan HELD 2/2 ANNETTE on CKD  - Trop peaked at 0.78. Medical management for NSTEMI at this time per Dr Kemp  - EKG noting Afib w/ LBBB (previous EKG w/ incomplete LBBB)  - Strict I/Os, daily weights, 1L fluid restrictions Pt w/ Hx of HFpEF (EF 50-55% on echo 4/2018) presenting with worsening exertional dyspnea, decreased exercise tolerance, orthopnea, increased lazarus LE edema x 3 weeks. CXR w/ pulmonary vascular congestion, elevated cardiac enzymes peaked at 0.78, BNP 9464. Acute on chronic CHF exacerbation likely 2/2 ischemia vs Lasix failure. Patient given Lasix 60mg IV in ED with minimal urine output   - ECHO w/ Bubble Study 5/8 w/ EF 35-40%, mod conc LVH, LV global hypokinesis, LA mildly dilated, RA dilated, trace MR, mild TR, mild pulm HTN, PASP 42  - Increase Lasix 40mg to 60m IV BID for diuresis, net neg 850cc this AM  - C/w HD Atenolol 50mg qd per Dr Kemp, restarted as outpt last week as pt did not tolerate other BB/CCB  - Outpt valsartan HELD 2/2 ANNETTE on CKD  - Trop peaked at 0.78. Medical management for NSTEMI at this time per Dr Kemp  - EKG noting Afib w/ LBBB (previous EKG w/ incomplete LBBB)  - Strict I/Os, daily weights, 1L fluid restrictions

## 2018-05-08 NOTE — PROGRESS NOTE ADULT - PROBLEM SELECTOR PLAN 6
Patient with history of HTN on Atenolol, Norvasc, Losartan at home  - Currently normotensive, holding BP medications  - Add back as needed/tolerated Patient with history of HTN on Atenolol, Norvasc, valsartan at home  - Currently normotensive  - C/w Atenolol 50mg qd  - HOLD Valsartan in setting of ANNETTE on CKD

## 2018-05-08 NOTE — PROGRESS NOTE ADULT - PROBLEM SELECTOR PLAN 2
Patient with elevated Troponin on admission to 0.06, complaining of occasional intermittent exertional chest tightness which relieves with rest. No current chest pain, only complaining of epigastric pain.  - Trend cardiac enzymes to peak  - If rising concern that epigastric pain atypical presentation of NSTEMI - will Aspirin and Plavix load and start Heparin per ACS protocol  - Repeat EKG in AM  - ECHO in AM Pt w/ elevated Troponin on admission to 0.06, complaining of occasional intermittent exertional chest tightness when bending over which relieves with rest and sitting up. No current chest pain, only complaining of epigastric pain.  - Trop peaked at 0.78  - S/p ASA/Plavix load. C/w ASA 81mg qd, Plavix 75mg qd  - C/w Heparin drip x48hrs in setting of medical management of NSTEMI  - Started Lipitor 80mg qd  - C/w HD Atenolol 50mg qd  - Repeat EKG Afib w/ LBBB (previous EKG w/ incomplete LBBB)  - Pending ECHO today Pt w/ elevated Troponin on admission to 0.06, complaining of occasional intermittent exertional chest tightness when bending over which relieves with rest and sitting up. No current chest pain, only complaining of epigastric pain.  - Trop peaked at 0.78  - S/p ASA/Plavix load. C/w ASA 81mg qd, Plavix 75mg qd  - C/w Heparin drip x48hrs in setting of medical management of NSTEMI  - Started Lipitor 80mg qd  - C/w HD Atenolol 50mg qd  - Repeat EKG Afib w/ LBBB (previous EKG w/ incomplete LBBB)  - Echo w/ newly depressed EF 35-50%, from 50-55% last month Pt w/ elevated Troponin on admission to 0.06, complaining of occasional intermittent exertional chest tightness when bending over which relieves with rest and sitting up. No current chest pain, only complaining of epigastric pain.  - Trop peaked at 0.78  - EKG noting Afib w/ LBBB (previous EKG w/ incomplete LBBB)  - S/p ASA/Plavix load. C/w ASA 81mg qd, Plavix 75mg qd  - C/w Heparin drip x48hrs in setting of medical management of NSTEMI  - Started Lipitor 80mg qd  - C/w HD Atenolol 50mg qd  - Repeat EKG Afib w/ LBBB (previous EKG w/ incomplete LBBB)  - Echo w/ newly depressed EF 35-50%, from 50-55% last month

## 2018-05-09 LAB
ANION GAP SERPL CALC-SCNC: 13 MMOL/L — SIGNIFICANT CHANGE UP (ref 5–17)
APPEARANCE UR: (no result)
APTT BLD: 45 SEC — HIGH (ref 27.5–37.4)
APTT BLD: 60.3 SEC — HIGH (ref 27.5–37.4)
APTT BLD: 87.3 SEC — HIGH (ref 27.5–37.4)
BILIRUB UR-MCNC: NEGATIVE — SIGNIFICANT CHANGE UP
BUN SERPL-MCNC: 52 MG/DL — HIGH (ref 7–23)
CALCIUM SERPL-MCNC: 8.4 MG/DL — SIGNIFICANT CHANGE UP (ref 8.4–10.5)
CHLORIDE SERPL-SCNC: 93 MMOL/L — LOW (ref 96–108)
CO2 SERPL-SCNC: 27 MMOL/L — SIGNIFICANT CHANGE UP (ref 22–31)
COLOR SPEC: SIGNIFICANT CHANGE UP
CREAT ?TM UR-MCNC: 30 MG/DL — SIGNIFICANT CHANGE UP
CREAT SERPL-MCNC: 2.02 MG/DL — HIGH (ref 0.5–1.3)
DIFF PNL FLD: (no result)
GLUCOSE SERPL-MCNC: 105 MG/DL — HIGH (ref 70–99)
GLUCOSE UR QL: NEGATIVE — SIGNIFICANT CHANGE UP
HCT VFR BLD CALC: 43.8 % — SIGNIFICANT CHANGE UP (ref 34.5–45)
HGB BLD-MCNC: 14.1 G/DL — SIGNIFICANT CHANGE UP (ref 11.5–15.5)
KETONES UR-MCNC: NEGATIVE — SIGNIFICANT CHANGE UP
LEUKOCYTE ESTERASE UR-ACNC: NEGATIVE — SIGNIFICANT CHANGE UP
MAGNESIUM SERPL-MCNC: 2.2 MG/DL — SIGNIFICANT CHANGE UP (ref 1.6–2.6)
MCHC RBC-ENTMCNC: 30.4 PG — SIGNIFICANT CHANGE UP (ref 27–34)
MCHC RBC-ENTMCNC: 32.2 G/DL — SIGNIFICANT CHANGE UP (ref 32–36)
MCV RBC AUTO: 94.4 FL — SIGNIFICANT CHANGE UP (ref 80–100)
MICROALBUMIN UR-MCNC: 185.5 MG/DL — SIGNIFICANT CHANGE UP
MICROALBUMIN/CREAT UR-RTO: 6183 MG/G — HIGH (ref 0–30)
NITRITE UR-MCNC: NEGATIVE — SIGNIFICANT CHANGE UP
OSMOLALITY UR: 270 MOSMOL/KG — SIGNIFICANT CHANGE UP (ref 100–650)
PH UR: 6.5 — SIGNIFICANT CHANGE UP (ref 5–8)
PLATELET # BLD AUTO: 253 K/UL — SIGNIFICANT CHANGE UP (ref 150–400)
POTASSIUM SERPL-MCNC: 4.2 MMOL/L — SIGNIFICANT CHANGE UP (ref 3.5–5.3)
POTASSIUM SERPL-SCNC: 4.2 MMOL/L — SIGNIFICANT CHANGE UP (ref 3.5–5.3)
PROT UR-MCNC: >=300 MG/DL
RBC # BLD: 4.64 M/UL — SIGNIFICANT CHANGE UP (ref 3.8–5.2)
RBC # FLD: 14.2 % — SIGNIFICANT CHANGE UP (ref 10.3–16.9)
SODIUM SERPL-SCNC: 133 MMOL/L — LOW (ref 135–145)
SODIUM UR-SCNC: 64 MMOL/L — SIGNIFICANT CHANGE UP
SP GR SPEC: 1.02 — SIGNIFICANT CHANGE UP (ref 1–1.03)
UROBILINOGEN FLD QL: 0.2 E.U./DL — SIGNIFICANT CHANGE UP
UUN UR-MCNC: 258 MG/DL — SIGNIFICANT CHANGE UP
WBC # BLD: 9.1 K/UL — SIGNIFICANT CHANGE UP (ref 3.8–10.5)
WBC # FLD AUTO: 9.1 K/UL — SIGNIFICANT CHANGE UP (ref 3.8–10.5)

## 2018-05-09 PROCEDURE — 76770 US EXAM ABDO BACK WALL COMP: CPT | Mod: 26

## 2018-05-09 PROCEDURE — 99233 SBSQ HOSP IP/OBS HIGH 50: CPT

## 2018-05-09 PROCEDURE — 71045 X-RAY EXAM CHEST 1 VIEW: CPT | Mod: 26

## 2018-05-09 PROCEDURE — 99232 SBSQ HOSP IP/OBS MODERATE 35: CPT

## 2018-05-09 RX ORDER — HEPARIN SODIUM 5000 [USP'U]/ML
750 INJECTION INTRAVENOUS; SUBCUTANEOUS
Qty: 25000 | Refills: 0 | Status: DISCONTINUED | OUTPATIENT
Start: 2018-05-09 | End: 2018-05-10

## 2018-05-09 RX ORDER — HEPARIN SODIUM 5000 [USP'U]/ML
700 INJECTION INTRAVENOUS; SUBCUTANEOUS
Qty: 25000 | Refills: 0 | Status: DISCONTINUED | OUTPATIENT
Start: 2018-05-09 | End: 2018-05-09

## 2018-05-09 RX ORDER — FUROSEMIDE 40 MG
60 TABLET ORAL EVERY 8 HOURS
Qty: 0 | Refills: 0 | Status: DISCONTINUED | OUTPATIENT
Start: 2018-05-09 | End: 2018-05-11

## 2018-05-09 RX ORDER — CARVEDILOL PHOSPHATE 80 MG/1
3.12 CAPSULE, EXTENDED RELEASE ORAL EVERY 12 HOURS
Qty: 0 | Refills: 0 | Status: DISCONTINUED | OUTPATIENT
Start: 2018-05-10 | End: 2018-05-11

## 2018-05-09 RX ADMIN — Medication 60 MILLIGRAM(S): at 21:47

## 2018-05-09 RX ADMIN — CLOPIDOGREL BISULFATE 75 MILLIGRAM(S): 75 TABLET, FILM COATED ORAL at 10:27

## 2018-05-09 RX ADMIN — HEPARIN SODIUM 7 UNIT(S)/HR: 5000 INJECTION INTRAVENOUS; SUBCUTANEOUS at 10:27

## 2018-05-09 RX ADMIN — HEPARIN SODIUM 7 UNIT(S)/HR: 5000 INJECTION INTRAVENOUS; SUBCUTANEOUS at 03:00

## 2018-05-09 RX ADMIN — ATENOLOL 50 MILLIGRAM(S): 25 TABLET ORAL at 05:20

## 2018-05-09 RX ADMIN — ATORVASTATIN CALCIUM 80 MILLIGRAM(S): 80 TABLET, FILM COATED ORAL at 21:47

## 2018-05-09 RX ADMIN — Medication 60 MILLIGRAM(S): at 05:20

## 2018-05-09 RX ADMIN — Medication 60 MILLIGRAM(S): at 12:55

## 2018-05-09 RX ADMIN — HEPARIN SODIUM 7.5 UNIT(S)/HR: 5000 INJECTION INTRAVENOUS; SUBCUTANEOUS at 12:53

## 2018-05-09 RX ADMIN — Medication 81 MILLIGRAM(S): at 10:27

## 2018-05-09 NOTE — PROGRESS NOTE ADULT - PROBLEM SELECTOR PLAN 9
Weight adjusted HSQ  Miralax  C: FULL CODE, Pt's 2 daughters are HCP Weight adjusted HSQ  C: FULL CODE, Pt's 2 daughters are HCP

## 2018-05-09 NOTE — PROGRESS NOTE ADULT - PROBLEM SELECTOR PLAN 4
Patient with hyponatremia on admission to 130, ECF volume increased. Pending urine studies, though likely due to acute on chronic CHF exacerbation, hypervolemic hyponatremia.  -Na 132 today  - f/u Serum Osm, Urine Osm, Urine electrolytes  - Patient known to Dr. Martinez. F/u renal recs in AM Patient with hyponatremia on admission to 130, ECF volume increased. Pending urine studies, though likely due to acute on chronic CHF exacerbation, hypervolemic hyponatremia.  -Na 133 today  - f/u Serum Osm, Urine Osm  - Patient known to Dr. Martinez. F/u renal recs in AM

## 2018-05-09 NOTE — PROGRESS NOTE ADULT - PROBLEM SELECTOR PLAN 5
Patient with history of Afib, previously on Cardizem and Eliquis, now on Atenolol for rate control. Patient's Eliquis was discontinued as outpt and placed on ASA. ADR listed to Metoprolol however family states she "got loopy".  - C/w HD Atenolol 50mg qd per Dr Kemp, previously restarted as outpt as pt did not tolerate other BB/CCB  -Pt taken off Eliquis per Dr Kemp as outpt per family request  -c/w ASA 81mg qd Patient with history of Afib, previously on Cardizem and Eliquis, now on Atenolol for rate control. Patient's Eliquis was discontinued as outpt and placed on ASA. ADR listed to Metoprolol however family states she "got loopy".  - C/w HD Atenolol 50mg qd per Dr Kemp, previously restarted as outpt as pt did not tolerate other BB/CCB. Will discuss w/ family and pt of switching outpt Atenolol to Coreg given now newly depressed EF  -Pt taken off Eliquis per Dr Kemp as outpt per family request  -c/w ASA 81mg qd

## 2018-05-09 NOTE — PROGRESS NOTE ADULT - SUBJECTIVE AND OBJECTIVE BOX
CARDIOLOGY NP PROGRESS NOTE    Subjective: Pt seen and examined at bedside. Reports feeling fatigue. Denies chest pain, sob, lightheadedness, dizziness.  Remainder ROS otherwise negative.    Overnight Events: None    TELEMETRY: Afib 70-100s    EKG: Afib 80s w/ LBBB      VITAL SIGNS:  T(C): 36.3 (05-09-18 @ 09:32), Max: 36.7 (05-08-18 @ 14:33)  HR: 94 (05-09-18 @ 10:25) (72 - 96)  BP: 115/64 (05-09-18 @ 10:25) (114/61 - 140/79)  RR: 22 (05-09-18 @ 10:15) (15 - 24)  SpO2: 95% (05-09-18 @ 10:15) (93% - 97%)  Wt(kg): --    I&O's Summary    08 May 2018 07:01  -  09 May 2018 07:00  --------------------------------------------------------  IN: 486 mL / OUT: 2500 mL / NET: -2014 mL    09 May 2018 07:01  -  09 May 2018 11:59  --------------------------------------------------------  IN: 180 mL / OUT: 400 mL / NET: -220 mL          PHYSICAL EXAM:    General: A/ox 3, No acute Distress  Neck: Supple, NO JVD  Cardiac: S1 S2, No M/R/G  Pulmonary: Lungs w/ bibasilar crackles, Breathing unlabored on RA, No Rhonchi/Rales/Wheezing  Abdomen: Soft, Non-tender, +BS x 4 quads  Extremities: No Rashes, 3+ lazarus LE edema extending up thighs 1+  Neuro: A/o x 3, No focal deficits          LABS:                          14.1   9.1   )-----------( 253      ( 09 May 2018 02:32 )             43.8                              05-09    133<L>  |  93<L>  |  52<H>  ----------------------------<  105<H>  4.2   |  27  |  2.02<H>    Ca    8.4      09 May 2018 02:32  Phos  5.2     05-08  Mg     2.2     05-09    TPro  6.0  /  Alb  2.1<L>  /  TBili  0.2  /  DBili  x   /  AST  46<H>  /  ALT  16  /  AlkPhos  97  05-08    LIVER FUNCTIONS - ( 08 May 2018 02:28 )  Alb: 2.1 g/dL / Pro: 6.0 g/dL / ALK PHOS: 97 U/L / ALT: 16 U/L / AST: 46 U/L / GGT: x         PT/INR - ( 07 May 2018 16:40 )   PT: 11.6 sec;   INR: 1.04          PTT - ( 09 May 2018 11:26 )  PTT:45.0 sec  CAPILLARY BLOOD GLUCOSE        CARDIAC MARKERS ( 08 May 2018 11:16 )  x     / 0.70 ng/mL / 249 U/L / x     / 32.9 ng/mL  CARDIAC MARKERS ( 08 May 2018 02:28 )  x     / 0.78 ng/mL / 273 U/L / x     / 35.4 ng/mL  CARDIAC MARKERS ( 07 May 2018 20:21 )  x     / 0.41 ng/mL / 226 U/L / x     / 22.8 ng/mL  CARDIAC MARKERS ( 07 May 2018 16:40 )  x     / 0.06 ng/mL / 127 U/L / x     / 7.6 ng/mL          Allergies:  metoprolol (Unknown)  penicillin (Rash)    MEDICATIONS  (STANDING):  aspirin  chewable 81 milliGRAM(s) Oral daily  ATENolol  Tablet 50 milliGRAM(s) Oral daily  atorvastatin 80 milliGRAM(s) Oral at bedtime  clopidogrel Tablet 75 milliGRAM(s) Oral daily  furosemide   Injectable 60 milliGRAM(s) IV Push every 8 hours  heparin  Infusion 750 Unit(s)/Hr (7.5 mL/Hr) IV Continuous <Continuous>    MEDICATIONS  (PRN):        DIAGNOSTIC TESTS:

## 2018-05-09 NOTE — PHYSICAL THERAPY INITIAL EVALUATION ADULT - PERTINENT HX OF CURRENT PROBLEM, REHAB EVAL
89yo F with a PMHx of A fib on Eliquis, HFpEF 50%, and HTN who presents with progressive dyspnea on exertion, lazarus LE edema. Admitted to tele for acute on chronic CHF exacerbation.

## 2018-05-09 NOTE — PROGRESS NOTE ADULT - PROBLEM SELECTOR PLAN 3
Patient presenting with Cr 1.90, baseline of 1.4 earlier this year though in prior records as low as 0.8. Likely element of CKD as well.  - F/u urine electrolytes  - Pending Renal US  - Trend BUN/Crea, 45/2.14 today  - Avoid nephrotoxic agents  - Renally dose all meds  - HOLD Valsartan in setting of ANNETTE on CKD  - F/u Renal Dr Martinez recs Patient presenting with Cr 1.90, baseline of 1.4 earlier this year though in prior records as low as 0.8. Likely element of CKD as well.  - FEUr consistent w/ prerenal disease  - F/u  Renal US results  - Trend BUN/Crea, 52/2.02 today  - Avoid nephrotoxic agents  - Renally dose all meds  - HOLD Valsartan in setting of ANNETTE on CKD  - F/u Renal Dr Martinez recs

## 2018-05-09 NOTE — PROGRESS NOTE ADULT - ASSESSMENT
89yo F with a PMHx of A fib on Eliquis, HFpEF 50%, and HTN who presents with progressive dyspnea on exertion, lazarus LE edema. Admitted to tele for acute on chronic CHF exacerbation. 89yo F with a PMHx of A fib on Eliquis, HFpEF 50%, and HTN who presents with progressive dyspnea on exertion, lazarus LE edema. Admitted to tele for acute on chronic CHF exacerbation likely in setting of NSTEMI.

## 2018-05-09 NOTE — PROGRESS NOTE ADULT - PROBLEM SELECTOR PLAN 1
Pt w/ Hx of HFpEF (EF 50-55% on echo 4/2018) presenting with worsening exertional dyspnea, decreased exercise tolerance, orthopnea, increased lazarus LE edema x 3 weeks. CXR w/ pulmonary vascular congestion, elevated cardiac enzymes peaked at 0.78, BNP 9464. Acute on chronic CHF exacerbation likely 2/2 ischemia vs Lasix failure. Patient given Lasix 60mg IV in ED with minimal urine output   - ECHO w/ Bubble Study 5/8 w/ EF 35-40%, mod conc LVH, LV global hypokinesis, LA mildly dilated, RA dilated, trace MR, mild TR, mild pulm HTN, PASP 42  - Increase Lasix 40mg to 60m IV BID for diuresis, net neg 850cc this AM  - C/w HD Atenolol 50mg qd per Dr Kemp, restarted as outpt last week as pt did not tolerate other BB/CCB  - Outpt valsartan HELD 2/2 ANNETTE on CKD  - Trop peaked at 0.78. Medical management for NSTEMI at this time per Dr Kemp  - EKG noting Afib w/ LBBB (previous EKG w/ incomplete LBBB)  - Strict I/Os, daily weights, 1L fluid restrictions Pt w/ previous Hx of HFpEF (EF 50-55% on echo 4/2018) presenting with worsening exertional dyspnea, decreased exercise tolerance, orthopnea, increased lazarus LE edema x 3 weeks. CXR w/ pulmonary vascular congestion, elevated cardiac enzymes peaked at 0.78, BNP 9464. Acute systolic CHF exacerbation likely 2/2 ischemia vs Lasix failure vs tachyarrhythmias. Patient given Lasix 60mg IV in ED with minimal urine output   - ECHO w/ Bubble Study 5/8 w/ EF 35-40%, mod conc LVH, LV global hypokinesis, LA mildly dilated, RA dilated, trace MR, mild TR, mild pulm HTN, PASP 42  - Increase Lasix 60m IV BID to TID for diuresis, goal net neg 2L/day  - C/w HD Atenolol 50mg qd per Dr Kemp, restarted as outpt last week as pt did not tolerate other BB/CCB. Will discuss w/ family and pt of switching outpt Atenolol to Coreg given now newly depressed EF  - Outpt valsartan HELD 2/2 ANNETTE on CKD  - Trop peaked at 0.78. Medical management for NSTEMI at this time per Dr Kemp  - EKG noting Afib w/ LBBB (prior EKGs w/ incomplete LBBB last admission)  - Strict I/Os, daily weights, 1L fluid restrictions

## 2018-05-09 NOTE — PROGRESS NOTE ADULT - ASSESSMENT
89yo F with a PMHx of A fib on Eliquis, HFpEF 50%, and HTN who presents with progressive dyspnea on exertion, lazarus LE edema. Admitted to tele for acute on chronic CHF exacerbation likely in setting of NSTEMI.    Problem/Plan - 1:  ·  Problem: CHF (congestive heart failure).  Plan: Pt w/ previous Hx of HFpEF (EF 50-55% on echo 4/2018) presenting with worsening exertional dyspnea, decreased exercise tolerance, orthopnea, increased lazarus LE edema x 3 weeks. CXR w/ pulmonary vascular congestion, elevated cardiac enzymes peaked at 0.78, BNP 9464. Acute systolic CHF exacerbation likely 2/2 ischemia vs Lasix failure vs tachyarrhythmias. Patient given Lasix 60mg IV in ED with minimal urine output   - ECHO w/ Bubble Study 5/8 w/ EF 35-40%, mod conc LVH, LV global hypokinesis, LA mildly dilated, RA dilated, trace MR, mild TR, mild pulm HTN, PASP 42  - Increase Lasix 60m IV BID to TID for diuresis, goal net neg 2L/day  - C/w HD Atenolol 50mg qd per Dr Kemp, restarted as outpt last week as pt did not tolerate other BB/CCB. Will discuss w/ family and pt of switching outpt Atenolol to Coreg given now newly depressed EF  - Outpt valsartan HELD 2/2 ANNETTE on CKD  - Trop peaked at 0.78. Medical management for NSTEMI at this time per Dr Kemp  - EKG noting Afib w/ LBBB (prior EKGs w/ incomplete LBBB last admission)  - Strict I/Os, daily weights, 1L fluid restrictions.     Problem/Plan - 2:  ·  Problem: NSTEMI (non-ST elevated myocardial infarction).  Plan: Pt w/ elevated Troponin on admission to 0.06, complaining of occasional intermittent exertional chest tightness when bending over which relieves with rest and sitting up. No current chest pain, only complaining of epigastric pain.  - Trop peaked at 0.78  - EKG noting Afib w/ LBBB (previous EKG w/ incomplete LBBB)  - S/p ASA/Plavix load. C/w ASA 81mg qd, Plavix 75mg qd   - C/w Heparin drip x48hrs in setting of medical management of NSTEMI, will complete at 10pm tonight  - Started Lipitor 80mg qd  - C/w HD Atenolol 50mg qd  - Repeat EKG Afib w/ LBBB (previous EKG w/ incomplete LBBB)  - Echo w/ newly depressed EF 35-50%, from 50-55% last month.     Problem/Plan - 3:  ·  Problem: ANNETTE (acute kidney injury).  Plan: Patient presenting with Cr 1.90, baseline of 1.4 earlier this year though in prior records as low as 0.8. Likely element of CKD as well.  - FEUr consistent w/ prerenal disease  - F/u  Renal US results  - Trend BUN/Crea, 52/2.02 today  - Avoid nephrotoxic agents  - Renally dose all meds  - HOLD Valsartan in setting of ANNETTE on CKD  - F/u Renal Dr Martinez recs.     Problem/Plan - 4:  ·  Problem: Hyponatremia.  Plan: Patient with hyponatremia on admission to 130, ECF volume increased. Pending urine studies, though likely due to acute on chronic CHF exacerbation, hypervolemic hyponatremia.  -Na 133 today  - f/u Serum Osm, Urine Osm  - Patient known to Dr. Martinez. F/u renal recs in AM.     Problem/Plan - 5:  ·  Problem: Atrial fibrillation.  Plan: Patient with history of Afib, previously on Cardizem and Eliquis, now on Atenolol for rate control. Patient's Eliquis was discontinued as outpt and placed on ASA. ADR listed to Metoprolol however family states she "got loopy".  - C/w HD Atenolol 50mg qd per Dr Kemp, previously restarted as outpt as pt did not tolerate other BB/CCB. Will discuss w/ family and pt of switching outpt Atenolol to Coreg given now newly depressed EF  -Pt taken off Eliquis per Dr Kemp as outpt per family request  -c/w ASA 81mg qd.     Problem/Plan - 6:  Problem: HTN (hypertension). Plan: Patient with history of HTN on Atenolol, Norvasc, valsartan at home  - Currently normotensive  - C/w Atenolol 50mg qd  - HOLD Valsartan in setting of ANNETTE on CKD.    Problem/Plan - 7:  ·  Problem: Hypoalbuminemia.  Plan: Patient with albumin of 2.1 on admission with reports of nausea, decreased PO intake for the past few weeks, likely related to fluid overload and poor PO intake.  - Trend CMP  - Nutrition consult.     Problem/Plan - 8:  ·  Problem: Nutrition, metabolism, and development symptoms.  Plan: DASH/TLC diet, 1L fluid restriction  Nutrition consult as above given hypoalbuminemia and decreased appetite  No IVF at this time  Replete electrolytes PRN to K > 4, Mg > 2.     Problem/Plan - 9:  ·  Problem: Need for other prophylactic measure.  Plan: Weight adjusted HSQ  C: FULL CODE, Pt's 2 daughters are HCP.     Problem/Plan - 10:  Problem: Discharge planning issues. Plan; D/c pending clinical progress  PT consult today.

## 2018-05-09 NOTE — PROGRESS NOTE ADULT - SUBJECTIVE AND OBJECTIVE BOX
Patient is a 89yo F with a PMHx of A fib on Eliquis, HFpEF 50%, HTN with multiple recent admissions to [facility] for A fib w/RVR and CHF exacerbation who presents with progressive dyspnea on exertion and shortness of breath for ..............    In the ED, VS T 97.5, HR 92, /85, R 18, SpO2 94% on room air. Labs without leukocytosis or anemia, normal coags, hyponatremia to 130, Cr of 1.98 (baseline 1.4), albumin of 2.1, CKMB 7.6, Troponin 0.06, BNP 9464. EKG showing rate controlled atrial fibrillation with a LBBB unchanged from prior studies, CXR with pulmonary vascular congestion. Patient given 60mg IV Lasix and admitted to [facility] for management of acute on chronic CHF exacerbation.     Past Medical History:  Atrial fibrillation    CHF (congestive heart failure)    HTN (hypertension).    Past Surgical History:  H/O right knee surgery.     Pt seen and examined at bedside on      Reports feeling fatigue.        TELEMETRY: Afib 70-100s    EKG: Afib 80s w/ LBBB    ICU Vital Signs Last 24 Hrs  T(C): 36.7 (09 May 2018 13:17), Max: 36.7 (09 May 2018 13:17)  T(F): 98.1 (09 May 2018 13:17), Max: 98.1 (09 May 2018 13:17)  HR: 86 (09 May 2018 12:52) (72 - 96)  BP: 124/62 (09 May 2018 12:52) (114/61 - 140/79)  BP(mean): 85 (09 May 2018 12:52) (81 - 107)  ABP: --  ABP(mean): --  RR: 18 (09 May 2018 12:52) (16 - 24)  SpO2: 94% (09 May 2018 12:52) (93% - 97%)          PHYSICAL EXAM:    General: NAD  Neck: Supple, NO JVD  Cardiac: S1 S2, No M/R/G  Pulmonary: Lungs w/ bibasilar crackles, Breathing unlabored on RA, No Rhonchi/Rales/Wheezing  Abdomen: Soft, Non-tender, +BS x 4 quads  Extremities: No Rashes, 3+ lazarus LE edema extending up thighs 1+  Neuro: A/o x 3,           LABS:                          14.1   9.1   )-----------( 253      ( 09 May 2018 02:32 )             43.8                              05-09    133<L>  |  93<L>  |  52<H>  ----------------------------<  105<H>  4.2   |  27  |  2.02<H>    Ca    8.4      09 May 2018 02:32  Phos  5.2     05-08  Mg     2.2     05-09    TPro  6.0  /  Alb  2.1<L>  /  TBili  0.2  /  DBili  x   /  AST  46<H>  /  ALT  16  /  AlkPhos  97  05-08    LIVER FUNCTIONS - ( 08 May 2018 02:28 )  Alb: 2.1 g/dL / Pro: 6.0 g/dL / ALK PHOS: 97 U/L / ALT: 16 U/L / AST: 46 U/L / GGT: x         PT/INR - ( 07 May 2018 16:40 )   PT: 11.6 sec;   INR: 1.04          PTT - ( 09 May 2018 11:26 )  PTT:45.0 sec  CAPILLARY BLOOD GLUCOSE            Allergies:  metoprolol (Unknown)  penicillin (Rash)    MEDICATIONS  (STANDING):  aspirin  chewable 81 milliGRAM(s) Oral daily  ATENolol  Tablet 50 milliGRAM(s) Oral daily  atorvastatin 80 milliGRAM(s) Oral at bedtime  clopidogrel Tablet 75 milliGRAM(s) Oral daily  furosemide   Injectable 60 milliGRAM(s) IV Push every 8 hours  heparin  Infusion 750 Unit(s)/Hr (7.5 mL/Hr) IV Continuous <Continuous>

## 2018-05-09 NOTE — PROGRESS NOTE ADULT - PROBLEM SELECTOR PLAN 7
Patient with albumin of 2.1 on admission with reports of nausea, decreased PO intake for the past few weeks, likely related to fluid overload and poor PO intake.  - Trend CMP  - Nutrition consult in AM Patient with albumin of 2.1 on admission with reports of nausea, decreased PO intake for the past few weeks, likely related to fluid overload and poor PO intake.  - Trend CMP  - Nutrition consult

## 2018-05-09 NOTE — PROGRESS NOTE ADULT - ASSESSMENT
CKD stage 3, systolic CHF, HTN controlled. NEPHROTIC SYNDROME IS CONTRIBUTING TO FLUID OVERLOAD. This could be related to minimal change disease, membranous nephropathy, focal sclerosis, or amyloid/myeloma.    Suggest:    1. Continue lasix.  2. She is not a candidate for kidney biopsy, empiric therapy with immunosuppressive agents, or therapy for amyloid/myeloma, even if present. Discussed with Dr. Kemp and will discuss with family. Favor no further diagnostic interventions for nephrotic syndrome.  3. Follow SCr.  4. Continue atenolol. (Consider changing to toprol XL).    Please call with any questions.    Benjie Martinez MD, FACP, FASN | kidney.formerly [facility]  Nephrology, Hypertension, and Internal Medicine  Mobile: [phone] (Daytime Hours Only)  Office/Answering Service: [phone]  Asst. Prof. of Medicine, [facility] - Nephrology at [address]  [address], [address], [place], [place]

## 2018-05-09 NOTE — PROGRESS NOTE ADULT - SUBJECTIVE AND OBJECTIVE BOX
CC: SOB    INTERVAL HISTORY:    * Net 2L - yesterday on lasix. * HTN controlled. * CKD stable.     ROS: No chest pain. SOB with exertion.. No nausea.    PAST MEDICAL & SURGICAL HISTORY:  CHF (congestive heart failure)  Atrial fibrillation  HTN (hypertension)  H/O right knee surgery  No significant past surgical history  No significant past surgical history    PHYSICAL EXAM:  T(C): 36.7 (09 May 2018 13:17), Max: 36.7 (09 May 2018 13:17)  HR: 86 (09 May 2018 12:52)  BP: 124/62 (09 May 2018 12:52) (115/55 - 140/79)  RR: 18 (09 May 2018 12:52)  SpO2: 94% (09 May 2018 12:52)      08 May 2018 07:01  -  09 May 2018 07:00  --------------------------------------------------------  IN:    heparin Infusion: 36 mL    heparin Infusion: 20 mL    heparin Infusion: 56 mL    heparin Infusion: 14 mL    Oral Fluid: 360 mL  Total IN: 486 mL    OUT:    Voided: 2500 mL  Total OUT: 2500 mL    Total NET: -2014 mL      09 May 2018 07:01  -  09 May 2018 18:09  --------------------------------------------------------  IN:    Oral Fluid: 657 mL  Total IN: 657 mL    OUT:    Voided: 400 mL  Total OUT: 400 mL    Total NET: 257 mL        Weight 92.1 (07 May 2018 18:30)    Appearance: alert, pleasant, INAD.  ENT: oral mucosa moist, no pallor/cyanosis.  Neck: no JVD visible.  Cardiac: no rubs. no murmurs. Extremities: pitting legs  Skin: no rashes.  Extremities (digits): no clubbing or cyanosis.  Respratory effort: no access muscle use. Lungs: crackles bases  Abdomen: soft. nontender. no masses.  Psych affect: not depressed. Orientation: person, place, situation.     MEDICATIONS  (STANDING):  aspirin  chewable 81 milliGRAM(s) Oral daily  atorvastatin 80 milliGRAM(s) Oral at bedtime  clopidogrel Tablet 75 milliGRAM(s) Oral daily  furosemide   Injectable 60 milliGRAM(s) IV Push every 8 hours  heparin  Infusion 750 Unit(s)/Hr (7.5 mL/Hr) IV Continuous <Continuous>    MEDICATIONS  (PRN):    DATA:  133<L>  |  93<L>  |  52<H>  ----------------------------<  105<H>  Ca:8.4   (09 May 2018 02:32)  4.2     |  27     |  2.02<H>      eGFR if Non : 21 <L>  eGFR if : 24 <L>    TPro  6.0    /  Alb  2.1<L>  /  TBili  0.2    /  DBili  x      /  AST  46<H>  /  ALT  16     /  AlkPhos  97     08 May 2018 02:28    SCr 2.02 [09 May 2018 02:32]  SCr 2.14 [08 May 2018 02:28]  SCr 2.01 [07 May 2018 20:21]  SCr 1.90 [07 May 2018 16:40]  SCr 1.46 [2018 06:36]                          14.1   9.1   )-----------( 253      ( 09 May 2018 02:32 )             43.8     Phos:-- M.2 mg/dL PTH:-- Uric acid:-- Serum Osm:--  Ferritin:-- Iron:-- TIBC:-- Tsat:--  B12:-- TSH:-- (09 May 2018 02:32)    Urinalysis Basic - ( 09 May 2018 07:59 )  Color: Straw / Appearance: Hazy<!!> / S.020 / pH: x  Gluc: x / Ketone: NEGATIVE  / Bili: Negative / Urobili: 0.2 E.U./dL   Blood: x / Protein: >=300 mg/dL<!!> / Nitrite: NEGATIVE   Leuk Esterase: NEGATIVE / RBC: < 5 /HPF / WBC < 5 /HPF   Sq Epi: x / Non Sq Epi: 0-5 /HPF / Bacteria: Present /HPF<!!>      UProt:-- UCr:30 mg/dL P/C Ratio:-- 24 hour Prot:-- UVol:-- CrCl:--  Gary:64 mmol/L UOsm:270 mosmol/kg UVol:-- UCl:-- [place]:-- (09 May 2018 07:59)

## 2018-05-09 NOTE — PROGRESS NOTE ADULT - PROBLEM SELECTOR PLAN 8
DASH/TLC diet, 1L fluid restriction  Nutrition consult as above given hypoalbuminemia and decreased appetite  No IVF at this time  Replete electrolytes PRN to K > 4, Mg > 2

## 2018-05-09 NOTE — PROGRESS NOTE ADULT - PROBLEM SELECTOR PLAN 6
Patient with history of HTN on Atenolol, Norvasc, valsartan at home  - Currently normotensive  - C/w Atenolol 50mg qd  - HOLD Valsartan in setting of ANNETTE on CKD

## 2018-05-09 NOTE — PROGRESS NOTE ADULT - PROBLEM SELECTOR PLAN 2
Pt w/ elevated Troponin on admission to 0.06, complaining of occasional intermittent exertional chest tightness when bending over which relieves with rest and sitting up. No current chest pain, only complaining of epigastric pain.  - Trop peaked at 0.78  - EKG noting Afib w/ LBBB (previous EKG w/ incomplete LBBB)  - S/p ASA/Plavix load. C/w ASA 81mg qd, Plavix 75mg qd  - C/w Heparin drip x48hrs in setting of medical management of NSTEMI  - Started Lipitor 80mg qd  - C/w HD Atenolol 50mg qd  - Repeat EKG Afib w/ LBBB (previous EKG w/ incomplete LBBB)  - Echo w/ newly depressed EF 35-50%, from 50-55% last month Pt w/ elevated Troponin on admission to 0.06, complaining of occasional intermittent exertional chest tightness when bending over which relieves with rest and sitting up. No current chest pain, only complaining of epigastric pain.  - Trop peaked at 0.78  - EKG noting Afib w/ LBBB (previous EKG w/ incomplete LBBB)  - S/p ASA/Plavix load. C/w ASA 81mg qd, Plavix 75mg qd   - C/w Heparin drip x48hrs in setting of medical management of NSTEMI, will complete at 10pm tonight  - Started Lipitor 80mg qd  - C/w HD Atenolol 50mg qd  - Repeat EKG Afib w/ LBBB (previous EKG w/ incomplete LBBB)  - Echo w/ newly depressed EF 35-50%, from 50-55% last month

## 2018-05-09 NOTE — PHYSICAL THERAPY INITIAL EVALUATION ADULT - ADDITIONAL COMMENTS
Pt lives alone in elevator access apartment. Ambulates with SC at baseline. States that she has no HHA, but does have involved family that are able to assist her when needed. Does not own RW.

## 2018-05-10 ENCOUNTER — TRANSCRIPTION ENCOUNTER (OUTPATIENT)
Age: 83
End: 2018-05-10

## 2018-05-10 DIAGNOSIS — N04.9 NEPHROTIC SYNDROME WITH UNSPECIFIED MORPHOLOGIC CHANGES: ICD-10-CM

## 2018-05-10 LAB
ANION GAP SERPL CALC-SCNC: 13 MMOL/L — SIGNIFICANT CHANGE UP (ref 5–17)
APTT BLD: 30.2 SEC — SIGNIFICANT CHANGE UP (ref 27.5–37.4)
APTT BLD: 46.8 SEC — HIGH (ref 27.5–37.4)
APTT BLD: 65.2 SEC — HIGH (ref 27.5–37.4)
BUN SERPL-MCNC: 52 MG/DL — HIGH (ref 7–23)
CALCIUM SERPL-MCNC: 8.4 MG/DL — SIGNIFICANT CHANGE UP (ref 8.4–10.5)
CHLORIDE SERPL-SCNC: 96 MMOL/L — SIGNIFICANT CHANGE UP (ref 96–108)
CO2 SERPL-SCNC: 25 MMOL/L — SIGNIFICANT CHANGE UP (ref 22–31)
CREAT SERPL-MCNC: 1.88 MG/DL — HIGH (ref 0.5–1.3)
GLUCOSE SERPL-MCNC: 109 MG/DL — HIGH (ref 70–99)
HCT VFR BLD CALC: 42.4 % — SIGNIFICANT CHANGE UP (ref 34.5–45)
HGB BLD-MCNC: 14.1 G/DL — SIGNIFICANT CHANGE UP (ref 11.5–15.5)
MAGNESIUM SERPL-MCNC: 2.2 MG/DL — SIGNIFICANT CHANGE UP (ref 1.6–2.6)
MCHC RBC-ENTMCNC: 30.7 PG — SIGNIFICANT CHANGE UP (ref 27–34)
MCHC RBC-ENTMCNC: 33.3 G/DL — SIGNIFICANT CHANGE UP (ref 32–36)
MCV RBC AUTO: 92.2 FL — SIGNIFICANT CHANGE UP (ref 80–100)
PLATELET # BLD AUTO: 258 K/UL — SIGNIFICANT CHANGE UP (ref 150–400)
POTASSIUM SERPL-MCNC: 4.1 MMOL/L — SIGNIFICANT CHANGE UP (ref 3.5–5.3)
POTASSIUM SERPL-SCNC: 4.1 MMOL/L — SIGNIFICANT CHANGE UP (ref 3.5–5.3)
RBC # BLD: 4.6 M/UL — SIGNIFICANT CHANGE UP (ref 3.8–5.2)
RBC # FLD: 14.2 % — SIGNIFICANT CHANGE UP (ref 10.3–16.9)
SODIUM SERPL-SCNC: 134 MMOL/L — LOW (ref 135–145)
WBC # BLD: 7.8 K/UL — SIGNIFICANT CHANGE UP (ref 3.8–10.5)
WBC # FLD AUTO: 7.8 K/UL — SIGNIFICANT CHANGE UP (ref 3.8–10.5)

## 2018-05-10 PROCEDURE — 71045 X-RAY EXAM CHEST 1 VIEW: CPT | Mod: 26

## 2018-05-10 PROCEDURE — 99233 SBSQ HOSP IP/OBS HIGH 50: CPT

## 2018-05-10 RX ORDER — HEPARIN SODIUM 5000 [USP'U]/ML
700 INJECTION INTRAVENOUS; SUBCUTANEOUS
Qty: 25000 | Refills: 0 | Status: DISCONTINUED | OUTPATIENT
Start: 2018-05-10 | End: 2018-05-10

## 2018-05-10 RX ORDER — ACETAMINOPHEN 500 MG
650 TABLET ORAL EVERY 6 HOURS
Qty: 0 | Refills: 0 | Status: DISCONTINUED | OUTPATIENT
Start: 2018-05-10 | End: 2018-05-17

## 2018-05-10 RX ORDER — HEPARIN SODIUM 5000 [USP'U]/ML
900 INJECTION INTRAVENOUS; SUBCUTANEOUS
Qty: 25000 | Refills: 0 | Status: DISCONTINUED | OUTPATIENT
Start: 2018-05-10 | End: 2018-05-11

## 2018-05-10 RX ADMIN — CLOPIDOGREL BISULFATE 75 MILLIGRAM(S): 75 TABLET, FILM COATED ORAL at 08:45

## 2018-05-10 RX ADMIN — ATORVASTATIN CALCIUM 80 MILLIGRAM(S): 80 TABLET, FILM COATED ORAL at 21:46

## 2018-05-10 RX ADMIN — Medication 30 MILLILITER(S): at 16:11

## 2018-05-10 RX ADMIN — HEPARIN SODIUM 7 UNIT(S)/HR: 5000 INJECTION INTRAVENOUS; SUBCUTANEOUS at 13:55

## 2018-05-10 RX ADMIN — Medication 60 MILLIGRAM(S): at 05:37

## 2018-05-10 RX ADMIN — Medication 30 MILLILITER(S): at 21:46

## 2018-05-10 RX ADMIN — Medication 60 MILLIGRAM(S): at 13:54

## 2018-05-10 RX ADMIN — Medication 650 MILLIGRAM(S): at 08:42

## 2018-05-10 RX ADMIN — Medication 650 MILLIGRAM(S): at 11:00

## 2018-05-10 RX ADMIN — HEPARIN SODIUM 9 UNIT(S)/HR: 5000 INJECTION INTRAVENOUS; SUBCUTANEOUS at 17:29

## 2018-05-10 RX ADMIN — Medication 81 MILLIGRAM(S): at 08:45

## 2018-05-10 RX ADMIN — Medication 60 MILLIGRAM(S): at 21:46

## 2018-05-10 RX ADMIN — CARVEDILOL PHOSPHATE 3.12 MILLIGRAM(S): 80 CAPSULE, EXTENDED RELEASE ORAL at 05:37

## 2018-05-10 RX ADMIN — CARVEDILOL PHOSPHATE 3.12 MILLIGRAM(S): 80 CAPSULE, EXTENDED RELEASE ORAL at 21:46

## 2018-05-10 NOTE — DISCHARGE NOTE ADULT - MEDICATION SUMMARY - MEDICATIONS TO STOP TAKING
I will STOP taking the medications listed below when I get home from the hospital:    furosemide 20 mg oral tablet  -- 1 tab(s) by mouth 2 times a day    atenolol 50 mg oral tablet  -- 1 tab(s) by mouth once a day    amLODIPine 5 mg oral tablet  -- 1 tab(s) by mouth once a day

## 2018-05-10 NOTE — PROGRESS NOTE ADULT - PROBLEM SELECTOR PLAN 1
Patient found to have nephrotic syndrome (albumin 2.1, protein in urine and peripheral edema).   - Heparin gtt restarted as patient at risk for thrombotic disease as discussed with Dr. Kemp and Michelle, monitor PTT and dose accordingly. Per Dr. Kemp, discussed anticoagulation risks vs. benefits in detail and family does not want to continue anticoagulation upon discharge and is only agreeable to aspirin   - renal consulted, appreciate recs   - patient is not a candidate for kidney biopsy, empiric therapy with immunosuppressive agents, or therapy for amyloid/myeloma, even if present. Family does not prefer diagnostic interventions.   - c/w IV lasix 60mg TID, goal net neg 2L/day   - c/w coreg 3.125 mg BID   - monitor BUN/Creat

## 2018-05-10 NOTE — PROGRESS NOTE ADULT - PROBLEM SELECTOR PLAN 4
Patient presenting with Cr 1.90, baseline of 1.4 earlier this year though in prior records as low as 0.8. Likely element of CKD as well.  - FEUr consistent w/ prerenal disease  - Renal US, no hydronephrosis.   - Trend BUN/Crea, 52/1.88 today  - Avoid nephrotoxic agents  - Renally dose all meds  - Outpt valsartan HELD 2/2 ANNETTE on CKD, consider restarting as BUN/Creat downtrending   - F/u Renal Dr Martinez recs

## 2018-05-10 NOTE — PROGRESS NOTE ADULT - PROBLEM SELECTOR PLAN 7
Patient with albumin of 2.1 on admission with reports of nausea, decreased PO intake for the past few weeks, likely related to fluid overload and poor PO intake.  - Trend CMP  - Nutrition consult

## 2018-05-10 NOTE — PROGRESS NOTE ADULT - PROBLEM SELECTOR PLAN 2
Pt w/ previous Hx of HFpEF (EF 50-55% on echo 4/2018) presenting with worsening exertional dyspnea, decreased exercise tolerance, orthopnea, increased lazarus LE edema x 3 weeks. CXR w/ pulmonary vascular congestion, elevated cardiac enzymes peaked at 0.78, BNP 9464. Acute systolic CHF exacerbation likely 2/2 ischemia vs Lasix failure vs tachyarrhythmias. Patient given Lasix 60mg IV in ED with minimal urine output   - ECHO w/ Bubble Study 5/8 w/ EF 35-40%, mod conc LVH, LV global hypokinesis, LA mildly dilated, RA dilated, trace MR, mild TR, mild pulm HTN, PASP 42  - Nephrotic syndrome contributing to volume overload , which could be related to minimal change disease, membranous nephropathy, focal sclerosis, or amyloid/myeloma.  - c/w Lasix 60mg IV TID for diuresis, goal net neg 2L/day  - c/w coreg 3.125 mg BID   - Outpt valsartan HELD 2/2 ANNETTE on CKD, consider restarting as BUN/Creat downtrending   - Trop peaked at 0.78. Medical management for NSTEMI at this time per Dr Kemp  - EKG noting Afib w/ LBBB (prior EKGs w/ incomplete LBBB last admission)  - Strict I/Os, daily weights, 1L fluid restrictions  - Goal net neg 2L/day

## 2018-05-10 NOTE — PROGRESS NOTE ADULT - ASSESSMENT
89yo F with a PMHx of A fib on Eliquis, HFpEF 50%, and HTN who presents with progressive dyspnea on exertion, lazarus LE edema 5/7,  admitted to tele for acute on chronic CHF exacerbation likely in setting of NSTEMI and now found to have nephrotic syndrome     Problem/Plan - 1:  ·  Problem: Nephrotic syndrome.  Plan: Patient found to have nephrotic syndrome (albumin 2.1, protein in urine and peripheral edema).   - Heparin gtt restarted as patient at risk for thrombotic disease as discussed with Dr. Kemp and Michelle, monitor PTT and dose accordingly. Per Dr. Kemp, discussed anticoagulation risks vs. benefits in detail and family does not want to continue anticoagulation upon discharge and is only agreeable to aspirin   - renal consulted, appreciate recs   - patient is not a candidate for kidney biopsy, empiric therapy with immunosuppressive agents, or therapy for amyloid/myeloma, even if present. Family does not prefer diagnostic interventions.   - c/w IV lasix 60mg TID, goal net neg 2L/day   - c/w coreg 3.125 mg BID   - monitor BUN/Creat.     Problem/Plan - 2:  ·  Problem: CHF (congestive heart failure).  Plan: Pt w/ previous Hx of HFpEF (EF 50-55% on echo 4/2018) presenting with worsening exertional dyspnea, decreased exercise tolerance, orthopnea, increased alzarus LE edema x 3 weeks. CXR w/ pulmonary vascular congestion, elevated cardiac enzymes peaked at 0.78, BNP 9464. Acute systolic CHF exacerbation likely 2/2 ischemia vs Lasix failure vs tachyarrhythmias. Patient given Lasix 60mg IV in ED with minimal urine output   - ECHO w/ Bubble Study 5/8 w/ EF 35-40%, mod conc LVH, LV global hypokinesis, LA mildly dilated, RA dilated, trace MR, mild TR, mild pulm HTN, PASP 42  - Nephrotic syndrome contributing to volume overload , which could be related to minimal change disease, membranous nephropathy, focal sclerosis, or amyloid/myeloma.  - c/w Lasix 60mg IV TID for diuresis, goal net neg 2L/day  - c/w coreg 3.125 mg BID   - Outpt valsartan HELD 2/2 ANNETTE on CKD, consider restarting as BUN/Creat downtrending   - Trop peaked at 0.78. Medical management for NSTEMI at this time per Dr Kemp  - EKG noting Afib w/ LBBB (prior EKGs w/ incomplete LBBB last admission)  - Strict I/Os, daily weights, 1L fluid restrictions  - Goal net neg 2L/day.     Problem/Plan - 3:  ·  Problem: NSTEMI (non-ST elevated myocardial infarction).  Plan: Pt w/ elevated Troponin on admission to 0.06, complaining of occasional intermittent exertional chest tightness when bending over which relieves with rest and sitting up. No current chest pain, only complaining of epigastric pain.  - Trop peaked at 0.78  - EKG noting Afib w/ LBBB (previous EKG w/ incomplete LBBB)  - S/p ASA/Plavix load. C/w ASA 81mg qd, Plavix 75mg qd   - C/w Heparin drip as patient at risk for thrombotic disease as discussed with Dr. Kemp and Michelle, monitor PTT and dose accordingly. Per Dr. Kemp, discussed anticoagulation risks vs. benefits in detail and family does not want to continue anticoagulation upon discharge and is only agreeable to aspirin   - c/w Lipitor 80mg qd  - c/w Coreg 3.125mg BID   - Repeat EKG Afib w/ LBBB (previous EKG w/ incomplete LBBB)  - Echo w/ newly depressed EF 35-50%, from 50-55% last month.     Problem/Plan - 4:  ·  Problem: ANNETTE (acute kidney injury).  Plan: Patient presenting with Cr 1.90, baseline of 1.4 earlier this year though in prior records as low as 0.8. Likely element of CKD as well.  - FEUr consistent w/ prerenal disease  - Renal US, no hydronephrosis.   - Trend BUN/Crea, 52/1.88 today  - Avoid nephrotoxic agents  - Renally dose all meds  - Outpt valsartan HELD 2/2 ANNETTE on CKD, consider restarting as BUN/Creat downtrending   - F/u Renal Dr Michelle del angel.     Problem/Plan - 5:  ·  Problem: Hyponatremia.  Plan: Patient with hyponatremia on admission to 130, ECF volume increased. Pending urine studies, though likely due to acute on chronic CHF exacerbation, hypervolemic hyponatremia.  - Na 134 today  - Serum Osm and Urine Osm WNL   - F/u Renal Dr Michelle del angel.     Problem/Plan - 6:  Problem: Atrial fibrillation. Plan: Patient with history of Afib, previously on Cardizem and Eliquis, now on Atenolol for rate control. Patient's Eliquis was discontinued as outpt and placed on ASA. ADR listed to Metoprolol however family states she "got loopy".  - C/w Coreg 3.125 mg BID   - Pt taken off Eliquis per Dr Kemp as outpt per family request  - c/w ASA 81mg qd  - C/w Heparin drip as patient at risk for thrombotic disease as discussed with Dr. Kemp and Michelle, monitor PTT and dose accordingly. Per Dr. Kemp, discussed anticoagulation risks vs. benefits in detail and family does not want to continue anticoagulation upon discharge and is only agreeable to aspirin     #Hypertension  Patient with history of HTN on Atenolol, Norvasc, valsartan at home  - Currently normotensive  - C/w Coreg 3.125mg BID   - HOLD Valsartan in setting of ANNETTE on CKD.    Problem/Plan - 7:  ·  Problem: Hypoalbuminemia.  Plan: Patient with albumin of 2.1 on admission with reports of nausea, decreased PO intake for the past few weeks, likely related to fluid overload and poor nausea, decreased PO intake for the past few weeks, likely related to fluid overload and poor PO intake.  - Trend CMP  - Nutrition consult.     Problem/Plan - 8:  ·  Problem: Nutrition, metabolism, and development symptoms.  Plan: DASH/TLC diet, 1L fluid restriction  Nutrition consult as above given hypoalbuminemia and decreased appetite  No IVF at this time  Replete electrolytes PRN to K > 4, Mg > 2.     Problem/Plan - 9:  ·  Problem: Need for other prophylactic measure.  Plan: Weight adjusted HSQ  C: FULL CODE, Pt's 2 daughters are HCP.     Problem/Plan - 10:  Problem: Discharge planning issues. Plan; PT eval, home with home PT.

## 2018-05-10 NOTE — PROGRESS NOTE ADULT - SUBJECTIVE AND OBJECTIVE BOX
Patient is a 89yo F with a PMHx of A fib on Eliquis, HFpEF 50%, HTN with multiple recent admissions to [facility] for A fib w/RVR and CHF exacerbation who presents with progressive dyspnea on exertion and shortness of breath for ..............    In the ED, VS T 97.5, HR 92, /85, R 18, SpO2 94% on room air. Labs without leukocytosis or anemia, normal coags, hyponatremia to 130, Cr of 1.98 (baseline 1.4), albumin of 2.1, CKMB 7.6, Troponin 0.06, BNP 9464. EKG showing rate controlled atrial fibrillation with a LBBB unchanged from prior studies, CXR with pulmonary vascular congestion. Patient given 60mg IV Lasix and admitted to [facility] for management of acute on chronic CHF exacerbation.     Past Medical History:  Atrial fibrillation    CHF (congestive heart failure)    HTN (hypertension).    Past Surgical History:  H/O right knee surgery.     Pt seen and examined at bedside on      Reports feeling fatigue.        Renal Ultrasound performed, IV Heparin gtt d/c'd and restarted 2/2 nephrotic syndrome     TELEMETRY: Afib (50s-80s), HARI 130s on ambulation     ICU Vital Signs Last 24 Hrs  T(C): 36.6 (10 May 2018 14:06), Max: 36.6 (10 May 2018 05:47)  T(F): 97.8 (10 May 2018 14:06), Max: 97.9 (10 May 2018 05:47)  HR: 92 (10 May 2018 18:08) (72 - 100)  BP: 95/58 (10 May 2018 18:08) (95/58 - 141/74)  BP(mean): 77 (10 May 2018 18:08) (75 - 106)  ABP: --  ABP(mean): --  RR: 16 (10 May 2018 18:08) (14 - 23)  SpO2: 94% (10 May 2018 18:08) (94% - 95%)          PHYSICAL EXAM:    General: NAD  Neck: Supple, NO JVD  Cardiac: S1 S2, No M/R/G  Pulmonary: Bibasilar crackles   Abdomen: Soft, Non -tender, +BS x 4 quads  Extremities: +2- +3 pitting edema BL LE extending to knees, no rashes noted   Neuro: A/o x 3,           LABS:                          14.1   7.8   )-----------( 258      ( 10 May 2018 06:24 )             42.4                              05-10    134<L>  |  96  |  52<H>  ----------------------------<  109<H>  4.1   |  25  |  1.88<H>    Ca    8.4      10 May 2018 06:24  Mg     2.2     05-10                              PTT - ( 10 May 2018 06:24 )  PTT:30.2 sec  CAPILLARY BLOOD GLUCOSE                Allergies:  metoprolol (Unknown)  penicillin (Rash)    MEDICATIONS  (STANDING):  aspirin  chewable 81 milliGRAM(s) Oral daily  atorvastatin 80 milliGRAM(s) Oral at bedtime  carvedilol 3.125 milliGRAM(s) Oral every 12 hours  clopidogrel Tablet 75 milliGRAM(s) Oral daily  furosemide   Injectable 60 milliGRAM(s) IV Push every 8 hours  heparin  Infusion 700 Unit(s)/Hr (7 mL/Hr) IV Continuous <Continuous>    MEDICATIONS  (PRN):  acetaminophen   Tablet. 650 milliGRAM(s) Oral every 6 hours PRN Moderate Pain (4 - 6)  aluminum hydroxide/magnesium hydroxide/simethicone Suspension 30 milliLiter(s) Oral every 6 hours PRN Dyspepsia        DIAGNOSTIC TESTS:     < from: US Retroperitoneal Complete (05.09.18 @ 14:16) >      IMPRESSION:  No hydronephrosis.    Left pleural effusion.    < end of copied text >

## 2018-05-10 NOTE — DISCHARGE NOTE ADULT - ADDITIONAL INSTRUCTIONS
1. Please take your medications as prescribed and use the oxygen as needed while at home. You will be seen by home hospice services of [facility].

## 2018-05-10 NOTE — PROGRESS NOTE ADULT - PROBLEM SELECTOR PLAN 3
Pt w/ elevated Troponin on admission to 0.06, complaining of occasional intermittent exertional chest tightness when bending over which relieves with rest and sitting up. No current chest pain, only complaining of epigastric pain.  - Trop peaked at 0.78  - EKG noting Afib w/ LBBB (previous EKG w/ incomplete LBBB)  - S/p ASA/Plavix load. C/w ASA 81mg qd, Plavix 75mg qd   - C/w Heparin drip as patient at risk for thrombotic disease as discussed with Dr. Kemp and Michelle, monitor PTT and dose accordingly. Per Dr. Kemp, discussed anticoagulation risks vs. benefits in detail and family does not want to continue anticoagulation upon discharge and is only agreeable to aspirin   - c/w Lipitor 80mg qd  - c/w Coreg 3.125mg BID   - Repeat EKG Afib w/ LBBB (previous EKG w/ incomplete LBBB)  - Echo w/ newly depressed EF 35-50%, from 50-55% last month

## 2018-05-10 NOTE — DISCHARGE NOTE ADULT - PATIENT PORTAL LINK FT
You can access the [facility] Patient Portal, offered by [facility], by registering with the following website: [email or web address]

## 2018-05-10 NOTE — DISCHARGE NOTE ADULT - HOSPITAL COURSE
Patient is a 89yo F with a PMHx of A fib on Eliquis, HFpEF 50%, HTN with multiple recent admissions to [facility] for A fib w/RVR and CHF exacerbation who presents with progressive dyspnea on exertion and shortness of breath. Patient states that since she was hospitalized in April her lower extremity edema has been worse and her exercise tolerance has decreased to the point she will only ambulate to the bathroom with the aide of a cane, otherwise opting to stay in bed. Patient also reports decreasing urinary output over the last month despite increase in her home dose of Lasix to 40mg BID. Patient endorses that she sleeps with her back propped up and is unsure if she can lay flat without orthopnea. She states she has occasional chest pressure particularly when ambulating associated with shortness of breath, relieved by rest. Patient currently denies chest pain or palpitations, but endorses constant epigastric pain and decreased PO intake over the past few weeks. Denies fevers, chills, cough, dysuria, vomiting, diarrhea. Currently breathing comfortably on nasal cannula with lower extremity edema.    In the ED, VS T 97.5, HR 92, /85, R 18, SpO2 94% on room air. Labs without leukocytosis or anemia, normal coags, hyponatremia to 130, Cr of 1.98 (baseline 1.4), albumin of 2.1, CKMB 7.6, Troponin 0.06, BNP 9464. EKG showing rate controlled atrial fibrillation with a LBBB unchanged from prior studies, CXR with pulmonary vascular congestion. Patient given 60mg IV Lasix and admitted to [facility] for management of acute on chronic CHF exacerbation. 89yo F with a PMHx of Afib on Eliquis, HFpEF 50%, HTN with multiple recent admissions to [facility] for A fib w/RVR and CHF exacerbation presented 5/7 with progressive BACK and SOB. Per pt, since hospitalized in April, LE edema has worsened, has had a decreased exercise tolerance and is no longer responding to the increase in HD Lasix.   In the ED, VS T 97.5, HR 92, /85, R 18, SpO2 94% on room air. Labs without leukocytosis or anemia, normal coags, hyponatremia to 130, Cr of 1.98 (baseline 1.4), albumin of 2.1, CKMB 7.6, Troponin 0.06, BNP 9464. EKG showing rate controlled atrial fibrillation with a LBBB unchanged from prior studies, CXR with pulmonary vascular congestion. Patient given 60mg IV Lasix and admitted to [facility] for management of acute on chronic CHF exacerbation.  During hospitalization, c/o intermittent exertional CP with Troponin peaked 0.78.  and patient medically managed for NSTEMI as family wishes to not have invasive procedures. Patient medically managed with IV Heparin, ASA and Plavix. ECHO w/ Bubble Study 5/8 w/ EF 35-40% (newly reduced from 50-55%), mod conc LVH, LV global hypokinesis, LA mildly dilated, RA dilated, trace MR, mild TR, mild pulm HTN, PASP 42. Patient initially diuresed with IV Lasix, however, patient without appropriate response for which she was placed on Bumex and Metolazone. 89yo F with a PMHx of Afib on Eliquis, HFpEF 50%, HTN with multiple recent admissions to [facility] for A fib w/RVR and CHF exacerbation presented 5/7 with progressive BACK and SOB. Per pt, since hospitalized in April, LE edema has worsened, has had a decreased exercise tolerance and is no longer responding to the increase in HD Lasix.   In the ED, VS T 97.5, HR 92, /85, R 18, SpO2 94% on room air. Labs without leukocytosis or anemia, normal coags, hyponatremia to 130, Cr of 1.98 (baseline 1.4), albumin of 2.1, CKMB 7.6, Troponin 0.06, BNP 9464. EKG showing rate controlled atrial fibrillation with a LBBB unchanged from prior studies, CXR with pulmonary vascular congestion. Patient given 60mg IV Lasix and admitted to [facility] for management of acute on chronic CHF exacerbation.  During hospitalization, c/o intermittent exertional CP with Troponin peaked 0.78.  and patient medically managed for NSTEMI as family wishes not to have invasive procedures. Patient medically managed with IV Heparin, ASA and Plavix. ECHO w/ Bubble Study 5/8 w/ EF 35-40% (newly reduced from 50-55%), mod conc LVH, LV global hypokinesis, LA mildly dilated, RA dilated, trace MR, mild TR, mild pulm HTN, PASP 42. Patient initially diuresed with IV Lasix, however, patient without appropriate response for which she was placed on Bumex and Metolazone. During this time, patient noted to have ANNETTE on CKD stage 3 as creatinine 1.9, where as baseline between 0.8 and 1.4. FEUR c/w pre-renal disease and renal ultrasound without hydronephrosis.     With diuresis, creatinine now downtrending 89yo F with a PMHx of Afib on Eliquis, HFpEF 50%, HTN with multiple recent admissions to [facility] for A fib w/RVR and CHF exacerbation presented 5/7 with progressive BACK and SOB. Per pt, since hospitalized in April, LE edema has worsened, has had a decreased exercise tolerance and is no longer responding to the increase in HD Lasix.   In the ED, VS T 97.5, HR 92, /85, R 18, SpO2 94% on room air. Labs without leukocytosis or anemia, normal coags, hyponatremia to 130, Cr of 1.98 (baseline 1.4), albumin of 2.1, CKMB 7.6, Troponin 0.06, BNP 9464. EKG showing rate controlled atrial fibrillation with a LBBB unchanged from prior studies, CXR with pulmonary vascular congestion. Patient given 60mg IV Lasix and admitted to [facility] for management of acute on chronic CHF exacerbation.  During hospitalization, c/o intermittent exertional CP with Troponin peaked 0.78.  and patient medically managed for NSTEMI as family wished not to have invasive procedures. Patient medically managed with IV Heparin, ASA and Plavix. ECHO w/ Bubble Study 5/8 w/ EF 35-40% (newly reduced from 50-55%), mod conc LVH, LV global hypokinesis, LA mildly dilated, RA dilated, trace MR, mild TR, mild pulm HTN, PASP 42. Patient initially diuresed with IV Lasix, however, patient without appropriate response for which she was placed on IV Bumex and Metolazone. During this time, patient noted to have ANNETTE on CKD stage 3 as creatinine 1.9, where as baseline between 0.8-1.4. FEUR c/w pre-renal disease and renal ultrasound without hydronephrosis. With diuresis, creatinine downtrending and creatinine now ____. Patient was found to have nephrotic syndrome as she had creatinine in protein, LE edema and was hypoalbuminemic. Family did not want diagnostic interventions and she was not a candidate for a kidney biopsy, empiric therapy with immunosuppresive agents or therapy for amyloid/myeloma even if present. 89yo F with a PMHx of Afib on Eliquis, HFpEF 50%, HTN with multiple recent admissions to [facility] for A fib w/RVR and CHF exacerbation presented 5/7 with progressive BACK and SOB. Per pt, since hospitalized in April, LE edema has worsened, has had a decreased exercise tolerance and is no longer responding to the increase in HD Lasix.   In the ED, VS T 97.5, HR 92, /85, R 18, SpO2 94% on room air. Labs without leukocytosis or anemia, normal coags, hyponatremia to 130, Cr of 1.98 (baseline 1.4), albumin of 2.1, CKMB 7.6, Troponin 0.06, BNP 9464. EKG showing rate controlled atrial fibrillation with a LBBB unchanged from prior studies, CXR with pulmonary vascular congestion. Patient given 60mg IV Lasix and admitted to [facility] for management of acute on chronic CHF exacerbation.  During hospitalization, c/o intermittent exertional CP with Troponin peaked 0.78.  and patient medically managed for NSTEMI as family wished not to have invasive procedures. Patient medically managed with IV Heparin, ASA and Plavix. ECHO w/ Bubble Study 5/8 w/ EF 35-40% (newly reduced from 50-55%), mod conc LVH, LV global hypokinesis, LA mildly dilated, RA dilated, trace MR, mild TR, mild pulm HTN, PASP 42. Patient initially diuresed with IV Lasix, however, patient without appropriate response for which she was placed on IV Bumex and Metolazone. During this time, patient noted to have ANNETTE on CKD stage 3 as creatinine 1.9, where as baseline between 0.8-1.4. FEUR c/w pre-renal disease and renal ultrasound without hydronephrosis. With diuresis, creatinine downtrending and creatinine now 1.34. Patient was found to have nephrotic syndrome as she had creatinine in protein, LE edema and was hypoalbuminemic. Family did not want diagnostic interventions and she was not a candidate for a kidney biopsy, empiric therapy with immunosuppresive agents or therapy for amyloid/myeloma even if present. Palliative was consulted for goals of care and plan for dispo. Goals of care discussed for medical management of symptoms and no invasive procedures and both daughters in agreement that they want patient to be comfortable at home. Patient medically stable and to be discharged home to live with daughter Brooke in [place] with home hospice services provided by [facility] and  arranging for portable O2 for transport while driving home. Family declined signing MOLST form as they still need further discussion on CPR/intubation. Patient to be discharged home today with home hospice services on 89yo F with a PMHx of Afib on Eliquis, HFpEF 50%, HTN with multiple recent admissions to [facility] for A fib w/RVR and CHF exacerbation presented 5/7 with progressive BACK and SOB. Per pt, since hospitalized in April, LE edema has worsened, has had a decreased exercise tolerance and is no longer responding to the increase in HD Lasix.   In the ED, VS T 97.5, HR 92, /85, R 18, SpO2 94% on room air. Labs without leukocytosis or anemia, normal coags, hyponatremia to 130, Cr of 1.98 (baseline 1.4), albumin of 2.1, CKMB 7.6, Troponin 0.06, BNP 9464. EKG showing rate controlled atrial fibrillation with a LBBB unchanged from prior studies, CXR with pulmonary vascular congestion. Patient given 60mg IV Lasix and admitted to [facility] for management of acute on chronic CHF exacerbation.  During hospitalization, c/o intermittent exertional CP with Troponin peaked 0.78.  and patient medically managed for NSTEMI as family wished not to have invasive procedures. Patient medically managed with IV Heparin, ASA and Plavix. ECHO w/ Bubble Study 5/8 w/ EF 35-40% (newly reduced from 50-55%), mod conc LVH, LV global hypokinesis, LA mildly dilated, RA dilated, trace MR, mild TR, mild pulm HTN, PASP 42. Patient initially diuresed with IV Lasix, however, patient without appropriate response for which she was placed on IV Bumex and Metolazone. During this time, patient noted to have ANNETTE on CKD stage 3 as creatinine 1.9, where as baseline between 0.8-1.4. FEUR c/w pre-renal disease and renal ultrasound without hydronephrosis. With diuresis, creatinine downtrending and creatinine now 1.34. Patient was found to have nephrotic syndrome as she had creatinine in protein, LE edema and was hypoalbuminemic. Family did not want diagnostic interventions and she was not a candidate for a kidney biopsy, empiric therapy with immunosuppresive agents or therapy for amyloid/myeloma even if present. Palliative was consulted for goals of care and plan for dispo. Goals of care discussed for medical management of symptoms and no invasive procedures and both daughters in agreement that they want patient to be comfortable at home. Patient medically stable and to be discharged home to live with daughter Brooke in [place] with home hospice services provided by [facility] and  arranging for portable O2 for transport while driving home. Family declined signing MOLST form as they still need further discussion on CPR/intubation. Per Dr. Kemp, discussed anticoagulation risks vs. benefits in detail and family does not want to continue anticoagulation upon discharge and is only agreeable to aspirin. Patient to be discharged home today with home hospice services on Coreg 6.25mg BID, Bumex 2mg TID, Metolazone 5mg QD, Atorvastatin 40mg QHS, ASA 81mg and Diovan 160 mg.

## 2018-05-10 NOTE — DISCHARGE NOTE ADULT - PLAN OF CARE
Ms. Kimberlee Wise did not keep this appointment Continue medications as listed. Maintain a low salt diet and weigh yourself daily. For any significant increases in daily weight with associated swelling in the legs or abdomen with shortness of breath, please call your doctor or go to the emergency room. Please continue medications as prescribed You were admitted to the hospital for shortness of breath due to congestive heart failure. You were given intravenous Bumex to get rid of the fluid in your lungs and to help you breathe better. Please take bumex and zaroxolyn as prescribed without missing doses. Please maintain a low salt diet (less than 2grams per day). Weigh yourself daily and report any weight gain over 2 pounds/day. While you were in the hospital, you suffered a silent heart attack. You were medically treated You were admitted to the hospital for shortness of breath due to congestive heart failure. You were given intravenous Bumex to get rid of the fluid in your lungs and to help you breathe better. Please take bumex and zaroxolyn as prescribed without missing doses. Please maintain a low salt diet (less than 2grams per day). Weigh yourself daily and report any weight gain over 2 pounds/day. You will be seen by home hospice services when you are discharged. While you were in the hospital, you suffered a silent heart attack. You were medically treated as you wished not to have any diagnostic interventions. An echo or ultrasound of your heart was performed which showed an ejection fraction or pumping function of your heart to be 35-40% (newly reduced from 50-55%). This is a result of the silent heart attack.  Please continue the medications as prescribed. While you were in the hospital you were seen by palliative care. Goals of care were discussed with both you and your daughters. It was decided that you did not want diagnostic interventions and that you wanted to be medically managed for your symptoms. You are medically stable and being discharged to home with your daughter in [place] and will be followed by [facility] [place].

## 2018-05-10 NOTE — PROGRESS NOTE ADULT - SUBJECTIVE AND OBJECTIVE BOX
CC: SOB    INTERVAL HISTORY:    * Breathing more comfortably. Net - 1.2L. * CKD stable. * BP controlled.     ROS: No chest pain. SOB with exertion. No nausea.    PAST MEDICAL & SURGICAL HISTORY:  CHF (congestive heart failure)  Atrial fibrillation  HTN (hypertension)  H/O right knee surgery  No significant past surgical history  No significant past surgical history    PHYSICAL EXAM:  T(C): 36.3 (10 May 2018 11:00), Max: 36.7 (09 May 2018 13:17)  HR: 100 (10 May 2018 08:22)  BP: 110/77 (10 May 2018 08:22) (105/64 - 141/74)  RR: 18 (10 May 2018 08:22)  SpO2: 94% (10 May 2018 08:22)      09 May 2018 07:01  -  10 May 2018 07:00  --------------------------------------------------------  IN:    heparin Infusion: 14 mL    heparin Infusion: 60 mL    Oral Fluid: 894 mL  Total IN: 968 mL    OUT:    Voided: 2200 mL  Total OUT: 2200 mL    Total NET: -1232 mL        Weight 92.1 (07 May 2018 18:30)    Appearance: alert, pleasant, INAD.  ENT: oral mucosa moist, no pallor/cyanosis.  Neck: no JVD visible.  Cardiac: no rubs. no murmurs. Extremities: pitting legs  Skin: no rashes.  Extremities (digits): no clubbing or cyanosis.  Respratory effort: no access muscle use. Lungs: decreased breath sound bases  Abdomen: soft. nontender. no masses.  Psych affect: not depressed. Orientation: person, place, situation.     MEDICATIONS  (STANDING):  aspirin  chewable 81 milliGRAM(s) Oral daily  atorvastatin 80 milliGRAM(s) Oral at bedtime  carvedilol 3.125 milliGRAM(s) Oral every 12 hours  clopidogrel Tablet 75 milliGRAM(s) Oral daily  furosemide   Injectable 60 milliGRAM(s) IV Push every 8 hours    MEDICATIONS  (PRN):  acetaminophen   Tablet. 650 milliGRAM(s) Oral every 6 hours PRN Moderate Pain (4 - 6)    DATA:  134<L>  |  96     |  52<H>  ----------------------------<  109<H>  Ca:8.4   (10 May 2018 06:24)  4.1     |  25     |  1.88<H>      eGFR if Non : 23 <L>  eGFR if : 26 <L>    TPro  6.0    /  Alb  2.1<L>  /  TBili  0.2    /  DBili  x      /  AST  46<H>  /  ALT  16     /  AlkPhos  97     08 May 2018 02:28    SCr 1.88 [10 May 2018 06:24]  SCr 2.02 [09 May 2018 02:32]  SCr 2.14 [08 May 2018 02:28]  SCr 2.01 [07 May 2018 20:21]  SCr 1.90 [07 May 2018 16:40]                          14.1   7.8   )-----------( 258      ( 10 May 2018 06:24 )             42.4     Phos:-- M.2 mg/dL PTH:-- Uric acid:-- Serum Osm:--  Ferritin:-- Iron:-- TIBC:-- Tsat:--  B12:-- TSH:-- (10 May 2018 06:24)    Urinalysis Basic - ( 09 May 2018 07:59 )  Color: Straw / Appearance: Hazy<!!> / S.020 / pH: x  Gluc: x / Ketone: NEGATIVE  / Bili: Negative / Urobili: 0.2 E.U./dL   Blood: x / Protein: >=300 mg/dL<!!> / Nitrite: NEGATIVE   Leuk Esterase: NEGATIVE / RBC: < 5 /HPF / WBC < 5 /HPF   Sq Epi: x / Non Sq Epi: 0-5 /HPF / Bacteria: Present /HPF<!!>      UProt:-- UCr:30 mg/dL P/C Ratio:-- 24 hour Prot:-- UVol:-- CrCl:--  Gary:64 mmol/L UOsm:270 mosmol/kg UVol:-- UCl:-- UK:-- (09 May 2018 07:59)

## 2018-05-10 NOTE — DISCHARGE NOTE ADULT - CARE PLAN
Principal Discharge DX:	CHF (congestive heart failure)  Assessment and plan of treatment:	Continue medications as listed. Maintain a low salt diet and weigh yourself daily. For any significant increases in daily weight with associated swelling in the legs or abdomen with shortness of breath, please call your doctor or go to the emergency room.  Secondary Diagnosis:	NSTEMI (non-ST elevated myocardial infarction) Principal Discharge DX:	CHF (congestive heart failure)  Goal:	Please continue medications as prescribed  Assessment and plan of treatment:	You were admitted to the hospital for shortness of breath due to congestive heart failure. You were given intravenous Bumex to get rid of the fluid in your lungs and to help you breathe better. Please take bumex and zaroxolyn as prescribed without missing doses. Please maintain a low salt diet (less than 2grams per day). Weigh yourself daily and report any weight gain over 2 pounds/day.  Secondary Diagnosis:	NSTEMI (non-ST elevated myocardial infarction)  Assessment and plan of treatment:	While you were in the hospital, you suffered a silent heart attack. You were medically treated Principal Discharge DX:	CHF (congestive heart failure)  Goal:	Please continue medications as prescribed  Assessment and plan of treatment:	You were admitted to the hospital for shortness of breath due to congestive heart failure. You were given intravenous Bumex to get rid of the fluid in your lungs and to help you breathe better. Please take bumex and zaroxolyn as prescribed without missing doses. Please maintain a low salt diet (less than 2grams per day). Weigh yourself daily and report any weight gain over 2 pounds/day. You will be seen by home hospice services when you are discharged.  Secondary Diagnosis:	NSTEMI (non-ST elevated myocardial infarction)  Assessment and plan of treatment:	While you were in the hospital, you suffered a silent heart attack. You were medically treated as you wished not to have any diagnostic interventions. An echo or ultrasound of your heart was performed which showed an ejection fraction or pumping function of your heart to be 35-40% (newly reduced from 50-55%). This is a result of the silent heart attack.  Please continue the medications as prescribed. Principal Discharge DX:	CHF (congestive heart failure)  Goal:	Please continue medications as prescribed  Assessment and plan of treatment:	You were admitted to the hospital for shortness of breath due to congestive heart failure. You were given intravenous Bumex to get rid of the fluid in your lungs and to help you breathe better. Please take bumex and zaroxolyn as prescribed without missing doses. Please maintain a low salt diet (less than 2grams per day). Weigh yourself daily and report any weight gain over 2 pounds/day. You will be seen by home hospice services when you are discharged.  Secondary Diagnosis:	NSTEMI (non-ST elevated myocardial infarction)  Assessment and plan of treatment:	While you were in the hospital, you suffered a silent heart attack. You were medically treated as you wished not to have any diagnostic interventions. An echo or ultrasound of your heart was performed which showed an ejection fraction or pumping function of your heart to be 35-40% (newly reduced from 50-55%). This is a result of the silent heart attack.  Please continue the medications as prescribed.  Secondary Diagnosis:	Palliative care by specialist  Assessment and plan of treatment:	While you were in the hospital you were seen by palliative care. Goals of care were discussed with both you and your daughters. It was decided that you did not want diagnostic interventions and that you wanted to be medically managed for your symptoms. You are medically stable and being discharged to home with your daughter in [place] and will be followed by [facility].

## 2018-05-10 NOTE — PROGRESS NOTE ADULT - ASSESSMENT
89yo F with a PMHx of A fib on Eliquis, HFpEF 50%, and HTN who presents with progressive dyspnea on exertion, lazarus LE edema 5/7,  admitted to tele for acute on chronic CHF exacerbation likely in setting of NSTEMI and now found to have nephrotic syndrome

## 2018-05-10 NOTE — PROGRESS NOTE ADULT - ASSESSMENT
CKD stage 3, systolic CHF, HTN controlled. NEPHROTIC SYNDROME IS CONTRIBUTING TO FLUID OVERLOAD. This could be related to minimal change disease, membranous nephropathy, focal sclerosis, or amyloid/myeloma.      Suggest:        1. Continue lasix.    2. She is not a candidate for kidney biopsy, empiric therapy with immunosuppressive agents, or therapy for amyloid/myeloma, even if present. Discussed with Dr. Kemp and will discuss with family. Favor no further diagnostic interventions for nephrotic syndrome.    3. Follow SCr.    4. Continue coreg,      Please call with any questions.        Benjie Martinez MD, FACP, FASN | kidney.[facility]    Nephrology, Hypertension, and Internal Medicine    Mobile: [phone] (Daytime Hours Only)    Office/Answering Service: [phone]    Asst. Prof. of Medicine, [employer]    [facility] - Nephrology at [address]    ·	[address], [address], [place], [place]

## 2018-05-10 NOTE — DISCHARGE NOTE ADULT - MEDICATION SUMMARY - MEDICATIONS TO TAKE
I will START or STAY ON the medications listed below when I get home from the hospital:    aspirin 81 mg oral tablet, chewable  -- 1 tab(s) by mouth once a day  -- Indication: For NSTEMI (non-ST elevated myocardial infarction)    valsartan 160 mg oral tablet  -- 1 tab(s) by mouth once a day  -- Indication: For CHF (congestive heart failure)    atorvastatin 40 mg oral tablet  -- 1 tab(s) by mouth once a day (at bedtime)  -- Indication: For NSTEMI (non-ST elevated myocardial infarction)    carvedilol 6.25 mg oral tablet  -- 1 tab(s) by mouth every 12 hours  -- Indication: For CHF (congestive heart failure)    metOLazone 5 mg oral tablet  -- 1 tab(s) by mouth once a day  -- Indication: For CHF (congestive heart failure)    bumetanide 2 mg oral tablet  -- 1 tab(s) by mouth every 8 hours  -- Indication: For CHF (congestive heart failure)

## 2018-05-10 NOTE — PROGRESS NOTE ADULT - PROBLEM SELECTOR PLAN 6
Patient with history of Afib, previously on Cardizem and Eliquis, now on Atenolol for rate control. Patient's Eliquis was discontinued as outpt and placed on ASA. ADR listed to Metoprolol however family states she "got loopy".  - C/w Coreg 3.125 mg BID   - Pt taken off Eliquis per Dr Kemp as outpt per family request  - c/w ASA 81mg qd  - C/w Heparin drip as patient at risk for thrombotic disease as discussed with Dr. Kemp and Michelle, monitor PTT and dose accordingly. Per Dr. Kemp, discussed anticoagulation risks vs. benefits in detail and family does not want to continue anticoagulation upon discharge and is only agreeable to aspirin     #Hypertension  Patient with history of HTN on Atenolol, Norvasc, valsartan at home  - Currently normotensive  - C/w Coreg 3.125mg BID   - HOLD Valsartan in setting of ANNETTE on CKD

## 2018-05-10 NOTE — PROGRESS NOTE ADULT - SUBJECTIVE AND OBJECTIVE BOX
CARDIOLOGY NP PROGRESS NOTE    Subjective: Patient seen and examined at bedside. Per patient, b/l LE edema still present without significant improvement and c/o mild BACK.  Denies CP, dizziness/diaphoresis, n/v, palpitations. Remainder ROS otherwise negative.    Overnight Events:  Renal Ultrasound performed, IV Heparin gtt d/c'd and restarted 2/2 nephrotic syndrome     TELEMETRY: Afib (50s-80s), HARI 130s on ambulation       VITAL SIGNS:  T(C): 36.6 (05-10-18 @ 14:06), Max: 36.6 (05-10-18 @ 05:47)  HR: 76 (05-10-18 @ 13:53) (72 - 100)  BP: 106/58 (05-10-18 @ 13:53) (105/64 - 141/74)  RR: 17 (05-10-18 @ 13:53) (14 - 23)  SpO2: 95% (05-10-18 @ 13:53) (94% - 96%)  Wt(kg): --    I&O's Summary    09 May 2018 07:01  -  10 May 2018 07:00  --------------------------------------------------------  IN: 968 mL / OUT: 2200 mL / NET: -1232 mL    10 May 2018 07:01  -  10 May 2018 15:51  --------------------------------------------------------  IN: 314 mL / OUT: 350 mL / NET: -36 mL          PHYSICAL EXAM:    General: A/ox 3, No acute Distress  Neck: Supple, NO JVD  Cardiac: S1 S2, No M/R/G  Pulmonary: Bibasilar crackles   Abdomen: Soft, Non -tender, +BS x 4 quads  Extremities: +2- +3 pitting edema BL LE extending to knees, no rashes noted   Neuro: A/o x 3, No focal deficits          LABS:                          14.1   7.8   )-----------( 258      ( 10 May 2018 06:24 )             42.4                              05-10    134<L>  |  96  |  52<H>  ----------------------------<  109<H>  4.1   |  25  |  1.88<H>    Ca    8.4      10 May 2018 06:24  Mg     2.2     05-10                              PTT - ( 10 May 2018 06:24 )  PTT:30.2 sec  CAPILLARY BLOOD GLUCOSE                Allergies:  metoprolol (Unknown)  penicillin (Rash)    MEDICATIONS  (STANDING):  aspirin  chewable 81 milliGRAM(s) Oral daily  atorvastatin 80 milliGRAM(s) Oral at bedtime  carvedilol 3.125 milliGRAM(s) Oral every 12 hours  clopidogrel Tablet 75 milliGRAM(s) Oral daily  furosemide   Injectable 60 milliGRAM(s) IV Push every 8 hours  heparin  Infusion 700 Unit(s)/Hr (7 mL/Hr) IV Continuous <Continuous>    MEDICATIONS  (PRN):  acetaminophen   Tablet. 650 milliGRAM(s) Oral every 6 hours PRN Moderate Pain (4 - 6)  aluminum hydroxide/magnesium hydroxide/simethicone Suspension 30 milliLiter(s) Oral every 6 hours PRN Dyspepsia        DIAGNOSTIC TESTS:     < from: US Retroperitoneal Complete (05.09.18 @ 14:16) >      IMPRESSION:  No hydronephrosis.    Left pleural effusion.    < end of copied text >

## 2018-05-10 NOTE — PROGRESS NOTE ADULT - PROBLEM SELECTOR PLAN 5
Patient with hyponatremia on admission to 130, ECF volume increased. Pending urine studies, though likely due to acute on chronic CHF exacerbation, hypervolemic hyponatremia.  - Na 134 today  - Serum Osm and Urine Osm WNL   - F/u Renal Dr Michelle watsons

## 2018-05-11 LAB
ANION GAP SERPL CALC-SCNC: 10 MMOL/L — SIGNIFICANT CHANGE UP (ref 5–17)
APTT BLD: 35.6 SEC — SIGNIFICANT CHANGE UP (ref 27.5–37.4)
APTT BLD: 86.9 SEC — HIGH (ref 27.5–37.4)
APTT BLD: 95.9 SEC — HIGH (ref 27.5–37.4)
BUN SERPL-MCNC: 58 MG/DL — HIGH (ref 7–23)
CALCIUM SERPL-MCNC: 8.6 MG/DL — SIGNIFICANT CHANGE UP (ref 8.4–10.5)
CHLORIDE SERPL-SCNC: 94 MMOL/L — LOW (ref 96–108)
CO2 SERPL-SCNC: 30 MMOL/L — SIGNIFICANT CHANGE UP (ref 22–31)
CREAT SERPL-MCNC: 1.86 MG/DL — HIGH (ref 0.5–1.3)
GLUCOSE SERPL-MCNC: 114 MG/DL — HIGH (ref 70–99)
HCT VFR BLD CALC: 42.4 % — SIGNIFICANT CHANGE UP (ref 34.5–45)
HGB BLD-MCNC: 13.9 G/DL — SIGNIFICANT CHANGE UP (ref 11.5–15.5)
INR BLD: 1.02 — SIGNIFICANT CHANGE UP (ref 0.88–1.16)
MAGNESIUM SERPL-MCNC: 2.2 MG/DL — SIGNIFICANT CHANGE UP (ref 1.6–2.6)
MCHC RBC-ENTMCNC: 30.8 PG — SIGNIFICANT CHANGE UP (ref 27–34)
MCHC RBC-ENTMCNC: 32.8 G/DL — SIGNIFICANT CHANGE UP (ref 32–36)
MCV RBC AUTO: 94 FL — SIGNIFICANT CHANGE UP (ref 80–100)
PLATELET # BLD AUTO: 254 K/UL — SIGNIFICANT CHANGE UP (ref 150–400)
POTASSIUM SERPL-MCNC: 4.3 MMOL/L — SIGNIFICANT CHANGE UP (ref 3.5–5.3)
POTASSIUM SERPL-SCNC: 4.3 MMOL/L — SIGNIFICANT CHANGE UP (ref 3.5–5.3)
PROTHROM AB SERPL-ACNC: 11.3 SEC — SIGNIFICANT CHANGE UP (ref 9.8–12.7)
RBC # BLD: 4.51 M/UL — SIGNIFICANT CHANGE UP (ref 3.8–5.2)
RBC # FLD: 14.4 % — SIGNIFICANT CHANGE UP (ref 10.3–16.9)
SODIUM SERPL-SCNC: 134 MMOL/L — LOW (ref 135–145)
WBC # BLD: 7.6 K/UL — SIGNIFICANT CHANGE UP (ref 3.8–10.5)
WBC # FLD AUTO: 7.6 K/UL — SIGNIFICANT CHANGE UP (ref 3.8–10.5)

## 2018-05-11 PROCEDURE — 93010 ELECTROCARDIOGRAM REPORT: CPT

## 2018-05-11 PROCEDURE — 99233 SBSQ HOSP IP/OBS HIGH 50: CPT

## 2018-05-11 PROCEDURE — 71045 X-RAY EXAM CHEST 1 VIEW: CPT | Mod: 26

## 2018-05-11 RX ORDER — HEPARIN SODIUM 5000 [USP'U]/ML
800 INJECTION INTRAVENOUS; SUBCUTANEOUS
Qty: 25000 | Refills: 0 | Status: DISCONTINUED | OUTPATIENT
Start: 2018-05-11 | End: 2018-05-11

## 2018-05-11 RX ORDER — CARVEDILOL PHOSPHATE 80 MG/1
6.25 CAPSULE, EXTENDED RELEASE ORAL EVERY 12 HOURS
Qty: 0 | Refills: 0 | Status: DISCONTINUED | OUTPATIENT
Start: 2018-05-11 | End: 2018-05-12

## 2018-05-11 RX ORDER — FUROSEMIDE 40 MG
80 TABLET ORAL EVERY 8 HOURS
Qty: 0 | Refills: 0 | Status: DISCONTINUED | OUTPATIENT
Start: 2018-05-11 | End: 2018-05-12

## 2018-05-11 RX ORDER — ONDANSETRON 8 MG/1
4 TABLET, FILM COATED ORAL ONCE
Qty: 0 | Refills: 0 | Status: COMPLETED | OUTPATIENT
Start: 2018-05-11 | End: 2018-05-11

## 2018-05-11 RX ORDER — HEPARIN SODIUM 5000 [USP'U]/ML
1000 INJECTION INTRAVENOUS; SUBCUTANEOUS
Qty: 25000 | Refills: 0 | Status: DISCONTINUED | OUTPATIENT
Start: 2018-05-11 | End: 2018-05-12

## 2018-05-11 RX ORDER — HEPARIN SODIUM 5000 [USP'U]/ML
700 INJECTION INTRAVENOUS; SUBCUTANEOUS
Qty: 25000 | Refills: 0 | Status: DISCONTINUED | OUTPATIENT
Start: 2018-05-11 | End: 2018-05-11

## 2018-05-11 RX ADMIN — Medication 80 MILLIGRAM(S): at 21:21

## 2018-05-11 RX ADMIN — Medication 81 MILLIGRAM(S): at 09:48

## 2018-05-11 RX ADMIN — HEPARIN SODIUM 10 UNIT(S)/HR: 5000 INJECTION INTRAVENOUS; SUBCUTANEOUS at 19:40

## 2018-05-11 RX ADMIN — CLOPIDOGREL BISULFATE 75 MILLIGRAM(S): 75 TABLET, FILM COATED ORAL at 09:47

## 2018-05-11 RX ADMIN — CARVEDILOL PHOSPHATE 6.25 MILLIGRAM(S): 80 CAPSULE, EXTENDED RELEASE ORAL at 17:59

## 2018-05-11 RX ADMIN — HEPARIN SODIUM 7 UNIT(S)/HR: 5000 INJECTION INTRAVENOUS; SUBCUTANEOUS at 13:11

## 2018-05-11 RX ADMIN — Medication 60 MILLIGRAM(S): at 12:50

## 2018-05-11 RX ADMIN — ONDANSETRON 4 MILLIGRAM(S): 8 TABLET, FILM COATED ORAL at 07:51

## 2018-05-11 RX ADMIN — HEPARIN SODIUM 8 UNIT(S)/HR: 5000 INJECTION INTRAVENOUS; SUBCUTANEOUS at 08:11

## 2018-05-11 RX ADMIN — ATORVASTATIN CALCIUM 80 MILLIGRAM(S): 80 TABLET, FILM COATED ORAL at 21:21

## 2018-05-11 RX ADMIN — Medication 60 MILLIGRAM(S): at 06:21

## 2018-05-11 RX ADMIN — CARVEDILOL PHOSPHATE 3.12 MILLIGRAM(S): 80 CAPSULE, EXTENDED RELEASE ORAL at 06:22

## 2018-05-11 NOTE — PROGRESS NOTE ADULT - PROBLEM SELECTOR PLAN 3
Pt w/ elevated Troponin on admission to 0.06, complaining of occasional intermittent exertional chest tightness when bending over which relieves with rest and sitting up. No current chest pain, only complaining of epigastric pain.  - Trop peaked at 0.78  - EKG noting Afib w/ LBBB (previous EKG w/ incomplete LBBB)  - S/p ASA/Plavix load. C/w ASA 81mg qd, Plavix 75mg qd   - C/w Heparin drip as patient at risk for thrombotic disease as discussed with Dr. Kemp and Michelle, monitor PTT and dose accordingly. Per Dr. Kemp, discussed anticoagulation risks vs. benefits in detail and family does not want to continue anticoagulation upon discharge and is only agreeable to aspirin   - c/w Lipitor 80mg qd  - coreg 3.125 mg BID increased to 6.25 mg BID   - Repeat EKG Afib w/ LBBB (previous EKG w/ incomplete LBBB)  - Echo w/ newly depressed EF 35-50%, from 50-55% last month

## 2018-05-11 NOTE — PROGRESS NOTE ADULT - SUBJECTIVE AND OBJECTIVE BOX
Patient is a 89yo F with a PMHx of A fib on Eliquis, HFpEF 50%, HTN with multiple recent admissions to [facility] for A fib w/RVR and CHF exacerbation who presents with progressive dyspnea on exertion and shortness of breath for ..............    In the ED, VS T 97.5, HR 92, /85, R 18, SpO2 94% on room air. Labs without leukocytosis or anemia, normal coags, hyponatremia to 130, Cr of 1.98 (baseline 1.4), albumin of 2.1, CKMB 7.6, Troponin 0.06, BNP 9464. EKG showing rate controlled atrial fibrillation with a LBBB unchanged from prior studies, CXR with pulmonary vascular congestion. Patient given 60mg IV Lasix and admitted to [facility] for management of acute on chronic CHF exacerbation.     Past Medical History:  Atrial fibrillation    CHF (congestive heart failure)    HTN (hypertension).    Past Surgical History:  H/O right knee surgery.     Pt seen and examined at bedside on      Reports feeling fatigue.    Overnight Events:  No acute events overnight    TELEMETRY:  Afib (70s-90s), HARI 120s on exertion    ICU Vital Signs Last 24 Hrs  T(C): 36.3 (11 May 2018 14:00), Max: 36.8 (10 May 2018 19:01)  T(F): 97.3 (11 May 2018 14:00), Max: 98.3 (10 May 2018 19:01)  HR: 82 (11 May 2018 15:53) (74 - 106)  BP: 111/65 (11 May 2018 15:53) (95/58 - 139/76)  BP(mean): 90 (11 May 2018 15:53) (77 - 93)  ABP: --  ABP(mean): --  RR: 16 (11 May 2018 15:53) (16 - 16)  SpO2: 95% (11 May 2018 15:53) (94% - 95%)      PHYSICAL EXAM:    General: A/ox 3, No acute Distress  Neck: Supple, NO JVD  Cardiac: S1 S2, No M/R/G  Pulmonary: Diminished breath sounds throughout with Bibasilar crackles   Abdomen: Soft, Non -tender, +BS x 4 quads  Extremities: +3 pitting edema BL LE extending to knees, worse than yesterday. No rashes noted   Neuro: A/o x 3, No focal deficits          LABS:                          13.9   7.6   )-----------( 254      ( 11 May 2018 07:07 )             42.4                              05-11    134<L>  |  94<L>  |  58<H>  ----------------------------<  114<H>  4.3   |  30  |  1.86<H>    Ca    8.6      11 May 2018 07:07  Mg     2.2     05-11                              PT/INR - ( 11 May 2018 07:07 )   PT: 11.3 sec;   INR: 1.02          PTT - ( 11 May 2018 07:07 )  PTT:95.9 sec  CAPILLARY BLOOD GLUCOSE                Allergies:  metoprolol (Unknown)  penicillin (Rash)    MEDICATIONS  (STANDING):  aspirin  chewable 81 milliGRAM(s) Oral daily  atorvastatin 80 milliGRAM(s) Oral at bedtime  carvedilol 3.125 milliGRAM(s) Oral every 12 hours  clopidogrel Tablet 75 milliGRAM(s) Oral daily  furosemide   Injectable 60 milliGRAM(s) IV Push every 8 hours  heparin  Infusion 800 Unit(s)/Hr (8 mL/Hr) IV Continuous <Continuous>    MEDICATIONS  (PRN):  acetaminophen   Tablet. 650 milliGRAM(s) Oral every 6 hours PRN Moderate Pain (4 - 6)  aluminum hydroxide/magnesium hydroxide/simethicone Suspension 30 milliLiter(s) Oral every 6 hours PRN Dyspepsia

## 2018-05-11 NOTE — PROGRESS NOTE ADULT - PROBLEM SELECTOR PLAN 4
Patient presenting with Cr 1.90, baseline of 1.4 earlier this year though in prior records as low as 0.8. Likely element of CKD as well.  - FEUr consistent w/ prerenal disease  - Renal US, no hydronephrosis.   - Trend BUN/Crea, 58/1.86 today  - Avoid nephrotoxic agents  - Renally dose all meds  - Outpt valsartan HELD 2/2 ANNETTE on CKD, consider restarting as BUN/Creat downtrending   - F/u Renal Dr Martinez recs

## 2018-05-11 NOTE — PROGRESS NOTE ADULT - PROBLEM SELECTOR PLAN 2
Pt w/ previous Hx of HFpEF (EF 50-55% on echo 4/2018) presenting with worsening exertional dyspnea, decreased exercise tolerance, orthopnea, increased lazarus LE edema x 3 weeks. CXR w/ pulmonary vascular congestion, elevated cardiac enzymes peaked at 0.78, BNP 9464. Acute systolic CHF exacerbation likely 2/2 ischemia vs Lasix failure vs tachyarrhythmias. Patient given Lasix 60mg IV in ED with minimal urine output   - ECHO w/ Bubble Study 5/8 w/ EF 35-40%, mod conc LVH, LV global hypokinesis, LA mildly dilated, RA dilated, trace MR, mild TR, mild pulm HTN, PASP 42  - Nephrotic syndrome contributing to volume overload , which could be related to minimal change disease, membranous nephropathy, focal sclerosis, or amyloid/myeloma.  - c/w Lasix 60mg IV TID for diuresis, goal net neg 2L/day  - coreg 3.125 mg BID increased to 6.25 mg BID   - Outpt valsartan HELD 2/2 ANNETTE on CKD, consider restarting as BUN/Creat downtrending   - Trop peaked at 0.78. Medical management for NSTEMI at this time per Dr Kemp  - EKG noting Afib w/ LBBB (prior EKGs w/ incomplete LBBB last admission)  - Strict I/Os, daily weights, 1L fluid restrictions  - Goal net neg 2L/day  - palliative c/s pending

## 2018-05-11 NOTE — PROGRESS NOTE ADULT - PROBLEM SELECTOR PLAN 6
Patient with history of Afib, previously on Cardizem and Eliquis, now on Atenolol for rate control. Patient's Eliquis was discontinued as outpt and placed on ASA. ADR listed to Metoprolol however family states she "got loopy".  - coreg 3.125 mg BID increased to 6.25 mg BID   - Pt taken off Eliquis per Dr Kemp as outpt per family request  - c/w ASA 81mg qd  - C/w Heparin drip as patient at risk for thrombotic disease as discussed with Dr. Kemp and Michelle, monitor PTT and dose accordingly. Per Dr. Kemp, discussed anticoagulation risks vs. benefits in detail and family does not want to continue anticoagulation upon discharge and is only agreeable to aspirin     #Hypertension  Patient with history of HTN on Atenolol, Norvasc, valsartan at home  - Currently normotensive  - coreg 3.125 mg BID increased to 6.25 mg BID   - HOLD Valsartan in setting of ANNETTE on CKD, consider restarting as BUN/Creat downtrending

## 2018-05-11 NOTE — PROGRESS NOTE ADULT - SUBJECTIVE AND OBJECTIVE BOX
CARDIOLOGY NP PROGRESS NOTE    Subjective: Patient seen and examined at bedside. Patient states has not been feeling well since last night. She is c/o increased LE edema and mild nausea and dyspepsia not relieved with Maalox. Also still c/o mild BACK. Denies CP, dizziness/diaphoresis, vomiting.  Remainder ROS otherwise negative.    Overnight Events:  No acute events overnight    TELEMETRY:     EKG:      VITAL SIGNS:  T(C): 36.8 (05-11-18 @ 10:00), Max: 36.8 (05-10-18 @ 19:01)  HR: 76 (05-11-18 @ 08:30) (74 - 99)  BP: 114/60 (05-11-18 @ 08:30) (95/58 - 139/76)  RR: 16 (05-11-18 @ 08:30) (16 - 17)  SpO2: 95% (05-11-18 @ 08:30) (94% - 95%)  Wt(kg): --    I&O's Summary    10 May 2018 07:01  -  11 May 2018 07:00  --------------------------------------------------------  IN: 833 mL / OUT: 1700 mL / NET: -867 mL    11 May 2018 07:01  -  11 May 2018 12:16  --------------------------------------------------------  IN: 152 mL / OUT: 0 mL / NET: 152 mL          PHYSICAL EXAM:    General: A/ox 3, No acute Distress  Neck: Supple, NO JVD  Cardiac: S1 S2, No M/R/G  Pulmonary: CTAB, Breathing unlabored, No Rhonchi/Rales/Wheezing  Abdomen: Soft, Non -tender, +BS x 4 quads  Extremities: No Rashes, No edema  Neuro: A/o x 3, No focal deficits          LABS:                          13.9   7.6   )-----------( 254      ( 11 May 2018 07:07 )             42.4                              05-11    134<L>  |  94<L>  |  58<H>  ----------------------------<  114<H>  4.3   |  30  |  1.86<H>    Ca    8.6      11 May 2018 07:07  Mg     2.2     05-11                              PT/INR - ( 11 May 2018 07:07 )   PT: 11.3 sec;   INR: 1.02          PTT - ( 11 May 2018 07:07 )  PTT:95.9 sec  CAPILLARY BLOOD GLUCOSE                Allergies:  metoprolol (Unknown)  penicillin (Rash)    MEDICATIONS  (STANDING):  aspirin  chewable 81 milliGRAM(s) Oral daily  atorvastatin 80 milliGRAM(s) Oral at bedtime  carvedilol 3.125 milliGRAM(s) Oral every 12 hours  clopidogrel Tablet 75 milliGRAM(s) Oral daily  furosemide   Injectable 60 milliGRAM(s) IV Push every 8 hours  heparin  Infusion 800 Unit(s)/Hr (8 mL/Hr) IV Continuous <Continuous>    MEDICATIONS  (PRN):  acetaminophen   Tablet. 650 milliGRAM(s) Oral every 6 hours PRN Moderate Pain (4 - 6)  aluminum hydroxide/magnesium hydroxide/simethicone Suspension 30 milliLiter(s) Oral every 6 hours PRN Dyspepsia        DIAGNOSTIC TESTS: CARDIOLOGY NP PROGRESS NOTE    Subjective: Patient seen and examined at bedside. Patient states has not been feeling well since last night. She is c/o increased LE edema and mild nausea and dyspepsia not relieved with Maalox. Also still c/o mild BACK. Denies CP, dizziness/diaphoresis, vomiting.  Remainder ROS otherwise negative.    Overnight Events:  No acute events overnight    TELEMETRY:  Afib (70s-90s), HARI 120s on exertion      VITAL SIGNS:  T(C): 36.8 (05-11-18 @ 10:00), Max: 36.8 (05-10-18 @ 19:01)  HR: 76 (05-11-18 @ 08:30) (74 - 99)  BP: 114/60 (05-11-18 @ 08:30) (95/58 - 139/76)  RR: 16 (05-11-18 @ 08:30) (16 - 17)  SpO2: 95% (05-11-18 @ 08:30) (94% - 95%)  Wt(kg): --    I&O's Summary    10 May 2018 07:01  -  11 May 2018 07:00  --------------------------------------------------------  IN: 833 mL / OUT: 1700 mL / NET: -867 mL    11 May 2018 07:01  -  11 May 2018 12:16  --------------------------------------------------------  IN: 152 mL / OUT: 0 mL / NET: 152 mL          PHYSICAL EXAM:    General: A/ox 3, No acute Distress  Neck: Supple, NO JVD  Cardiac: S1 S2, No M/R/G  Pulmonary: Diminished breath sounds throughout with Bibasilar crackles   Abdomen: Soft, Non -tender, +BS x 4 quads  Extremities: +3 pitting edema BL LE extending to knees,  no rashes noted   Neuro: A/o x 3, No focal deficits          LABS:                          13.9   7.6   )-----------( 254      ( 11 May 2018 07:07 )             42.4                              05-11    134<L>  |  94<L>  |  58<H>  ----------------------------<  114<H>  4.3   |  30  |  1.86<H>    Ca    8.6      11 May 2018 07:07  Mg     2.2     05-11                              PT/INR - ( 11 May 2018 07:07 )   PT: 11.3 sec;   INR: 1.02          PTT - ( 11 May 2018 07:07 )  PTT:95.9 sec  CAPILLARY BLOOD GLUCOSE                Allergies:  metoprolol (Unknown)  penicillin (Rash)    MEDICATIONS  (STANDING):  aspirin  chewable 81 milliGRAM(s) Oral daily  atorvastatin 80 milliGRAM(s) Oral at bedtime  carvedilol 3.125 milliGRAM(s) Oral every 12 hours  clopidogrel Tablet 75 milliGRAM(s) Oral daily  furosemide   Injectable 60 milliGRAM(s) IV Push every 8 hours  heparin  Infusion 800 Unit(s)/Hr (8 mL/Hr) IV Continuous <Continuous>    MEDICATIONS  (PRN):  acetaminophen   Tablet. 650 milliGRAM(s) Oral every 6 hours PRN Moderate Pain (4 - 6)  aluminum hydroxide/magnesium hydroxide/simethicone Suspension 30 milliLiter(s) Oral every 6 hours PRN Dyspepsia CARDIOLOGY NP PROGRESS NOTE    Subjective: Patient seen and examined at bedside. Patient states has not been feeling well since last night. She is c/o increased LE edema and mild nausea and dyspepsia not relieved with Maalox. Also still c/o mild BACK. Denies CP, dizziness/diaphoresis, vomiting.  Remainder ROS otherwise negative.    Overnight Events:  No acute events overnight    TELEMETRY:  Afib (70s-90s), HARI 120s on exertion      VITAL SIGNS:  T(C): 36.8 (05-11-18 @ 10:00), Max: 36.8 (05-10-18 @ 19:01)  HR: 76 (05-11-18 @ 08:30) (74 - 99)  BP: 114/60 (05-11-18 @ 08:30) (95/58 - 139/76)  RR: 16 (05-11-18 @ 08:30) (16 - 17)  SpO2: 95% (05-11-18 @ 08:30) (94% - 95%)  Wt(kg): --    I&O's Summary    10 May 2018 07:01  -  11 May 2018 07:00  --------------------------------------------------------  IN: 833 mL / OUT: 1700 mL / NET: -867 mL    11 May 2018 07:01  -  11 May 2018 12:16  --------------------------------------------------------  IN: 152 mL / OUT: 0 mL / NET: 152 mL          PHYSICAL EXAM:    General: A/ox 3, No acute Distress  Neck: Supple, NO JVD  Cardiac: S1 S2, No M/R/G  Pulmonary: Diminished breath sounds throughout with Bibasilar crackles   Abdomen: Soft, Non -tender, +BS x 4 quads  Extremities: +3 pitting edema BL LE extending to knees, worse than yesterday. No rashes noted   Neuro: A/o x 3, No focal deficits          LABS:                          13.9   7.6   )-----------( 254      ( 11 May 2018 07:07 )             42.4                              05-11    134<L>  |  94<L>  |  58<H>  ----------------------------<  114<H>  4.3   |  30  |  1.86<H>    Ca    8.6      11 May 2018 07:07  Mg     2.2     05-11                              PT/INR - ( 11 May 2018 07:07 )   PT: 11.3 sec;   INR: 1.02          PTT - ( 11 May 2018 07:07 )  PTT:95.9 sec  CAPILLARY BLOOD GLUCOSE                Allergies:  metoprolol (Unknown)  penicillin (Rash)    MEDICATIONS  (STANDING):  aspirin  chewable 81 milliGRAM(s) Oral daily  atorvastatin 80 milliGRAM(s) Oral at bedtime  carvedilol 3.125 milliGRAM(s) Oral every 12 hours  clopidogrel Tablet 75 milliGRAM(s) Oral daily  furosemide   Injectable 60 milliGRAM(s) IV Push every 8 hours  heparin  Infusion 800 Unit(s)/Hr (8 mL/Hr) IV Continuous <Continuous>    MEDICATIONS  (PRN):  acetaminophen   Tablet. 650 milliGRAM(s) Oral every 6 hours PRN Moderate Pain (4 - 6)  aluminum hydroxide/magnesium hydroxide/simethicone Suspension 30 milliLiter(s) Oral every 6 hours PRN Dyspepsia

## 2018-05-11 NOTE — PROGRESS NOTE ADULT - SUBJECTIVE AND OBJECTIVE BOX
Physical Medicine and Rehabilitation Progress Note    MCKINLEY MCCARTY    MRN-[id number]    Patient is a 89y old  Female who presents with a chief complaint of SOB (10 May 2018 18:30)      Vital Signs Last 24 Hrs  T(C): 36.1 (11 May 2018 04:55), Max: 36.8 (10 May 2018 19:01)  T(F): 97 (11 May 2018 04:55), Max: 98.3 (10 May 2018 19:01)  HR: 76 (11 May 2018 08:30) (74 - 99)  BP: 114/60 (11 May 2018 08:30) (95/58 - 139/76)  BP(mean): 78 (11 May 2018 08:30) (75 - 93)  RR: 16 (11 May 2018 08:30) (16 - 17)  SpO2: 95% (11 May 2018 08:30) (94% - 95%)    Current Functional Status in Physical Therapy:  OOB in chair with leg elevation / edema, no changes, no pain.  c/o loose stool this morning and fatigue.  Seen by PT  on 05/09/18.  No SOB  or   chest pain at this time.    Bed Mobility:    Transfers: contact guarding    Ambulation:  contact guarding      Impression:  1.  Deconditioned   2.  CHF / AF  3.. NSTEMI  4. Nephrotic syndrome / ABDIRIZAK        Recommendations:   1. Continue PT as tolerated    2.  ?  Home PT

## 2018-05-11 NOTE — PROGRESS NOTE ADULT - SUBJECTIVE AND OBJECTIVE BOX
CC: SOB    INTERVAL HISTORY:    * Breathing more comfortably. Net negative -800 on lasix. * CKD stable. * BP controlled.     ROS: No chest pain. SOB with exertion. No nausea.    PAST MEDICAL & SURGICAL HISTORY:  CHF (congestive heart failure)  Atrial fibrillation  HTN (hypertension)  H/O right knee surgery  No significant past surgical history  No significant past surgical history    PHYSICAL EXAM:  T(C): 36.8 (11 May 2018 18:47), Max: 36.8 (11 May 2018 10:00)  HR: 90 (11 May 2018 18:00)  BP: 100/74 (11 May 2018 18:00) (100/74 - 139/76)  RR: 16 (11 May 2018 18:00)  SpO2: 95% (11 May 2018 18:00)      10 May 2018 07:01  -  11 May 2018 07:00  --------------------------------------------------------  IN:    heparin Infusion: 14 mL    heparin Infusion: 99 mL    Oral Fluid: 720 mL  Total IN: 833 mL    OUT:    Voided: 1700 mL  Total OUT: 1700 mL    Total NET: -867 mL      11 May 2018 07:01  -  11 May 2018 19:04  --------------------------------------------------------  IN:    heparin Infusion: 40 mL    heparin Infusion: 49 mL    Oral Fluid: 797 mL  Total IN: 886 mL    OUT:    Voided: 800 mL  Total OUT: 800 mL    Total NET: 86 mL        Weight 92.1 (07 May 2018 18:30)    Appearance: alert, pleasant, INAD.  ENT: oral mucosa moist, no pallor/cyanosis.  Neck: no JVD visible.  Cardiac: no rubs. no murmurs. Extremities: pitting legs  Skin: no rashes.  Extremities (digits): no clubbing or cyanosis.  Respratory effort: no access muscle use. Lungs: decreased breath sounds bases  Abdomen: soft. nontender. no masses.  Psych affect: not depressed. Orientation: person, place, situation.     MEDICATIONS  (STANDING):  aspirin  chewable 81 milliGRAM(s) Oral daily  atorvastatin 80 milliGRAM(s) Oral at bedtime  carvedilol 6.25 milliGRAM(s) Oral every 12 hours  clopidogrel Tablet 75 milliGRAM(s) Oral daily  furosemide   Injectable 60 milliGRAM(s) IV Push every 8 hours  heparin  Infusion 700 Unit(s)/Hr (7 mL/Hr) IV Continuous <Continuous>    MEDICATIONS  (PRN):  acetaminophen   Tablet. 650 milliGRAM(s) Oral every 6 hours PRN Moderate Pain (4 - 6)  aluminum hydroxide/magnesium hydroxide/simethicone Suspension 30 milliLiter(s) Oral every 6 hours PRN Dyspepsia    DATA:  134<L>  |  94<L>  |  58<H>  ----------------------------<  114<H>  Ca:8.6   (11 May 2018 07:07)  4.3     |  30     |  1.86<H>      eGFR if Non : 23 <L>  eGFR if : 27 <L>        SCr 1.86 [11 May 2018 07:07]  SCr 1.88 [10 May 2018 06:24]  SCr 2.02 [09 May 2018 02:32]  SCr 2.14 [08 May 2018 02:28]  SCr 2.01 [07 May 2018 20:21]                          13.9   7.6   )-----------( 254      ( 11 May 2018 07:07 )             42.4     Phos:-- M.2 mg/dL PTH:-- Uric acid:-- Serum Osm:--  Ferritin:-- Iron:-- TIBC:-- Tsat:--  B12:-- TSH:-- (11 May 2018 07:07)    Urinalysis Basic - ( 09 May 2018 07:59 )  Color: Straw / Appearance: Hazy<!!> / S.020 / pH: x  Gluc: x / Ketone: NEGATIVE  / Bili: Negative / Urobili: 0.2 E.U./dL   Blood: x / Protein: >=300 mg/dL<!!> / Nitrite: NEGATIVE   Leuk Esterase: NEGATIVE / RBC: < 5 /HPF / WBC < 5 /HPF   Sq Epi: x / Non Sq Epi: 0-5 /HPF / Bacteria: Present /HPF<!!>      UProt:-- UCr:30 mg/dL P/C Ratio:-- 24 hour Prot:-- UVol:-- CrCl:--  Gary:64 mmol/L UOsm:270 mosmol/kg UVol:-- UCl:-- UK:-- (09 May 2018 07:59)

## 2018-05-11 NOTE — PROGRESS NOTE ADULT - PROBLEM SELECTOR PLAN 1
Patient found to have nephrotic syndrome (albumin 2.1, protein in urine and peripheral edema).   - Heparin gtt restarted as patient at risk for thrombotic disease as discussed with Dr. Kemp and Michelle, monitor PTT and dose accordingly. Per Dr. Kemp, discussed anticoagulation risks vs. benefits in detail and family does not want to continue anticoagulation upon discharge and is only agreeable to aspirin   - renal consulted, appreciate recs   - patient is not a candidate for kidney biopsy, empiric therapy with immunosuppressive agents, or therapy for amyloid/myeloma, even if present. Family does not prefer diagnostic interventions.   - c/w IV lasix 60mg TID, goal net neg 2L/day   - coreg 3.125 mg BID increased to 6.25 mg BID   - monitor BUN/Creat  - palliative c/s pending

## 2018-05-11 NOTE — PROGRESS NOTE ADULT - ASSESSMENT
89yo F with a PMHx of A fib on Eliquis, HFpEF 50%, and HTN who presents with progressive dyspnea on exertion, lazarus LE edema 5/7,  admitted to tele for acute on chronic CHF exacerbation likely in setting of NSTEMI, now found to have nephrotic syndrome

## 2018-05-11 NOTE — PROGRESS NOTE ADULT - ASSESSMENT
CKD stage 3, systolic CHF. Newly diagnosed nephrotic syndrome is contributing ot fluid overload.    Suggest:    1. Increase lasix to 80 tid. Aim net negative 1 - 2 L daily.  2. Not a candidate for renal biopsy or immunosupressive treatment for nephrotic syndrome. Family elects conservative treatment.  3. Follow SCr.  4. Continue coreg.    Please call with any questions.    Benjie Martinez MD, FACP, FASN | kidney.[facility]  Nephrology, Hypertension, and Internal Medicine  Mobile: [phone] (Daytime Hours Only)  Office/Answering Service: [phone]  Asst. Prof. of Medicine, [employer]  [facility] - Nephrology at [place] [address]  [address], [address], [place], [place]

## 2018-05-11 NOTE — PROGRESS NOTE ADULT - ASSESSMENT
89yo F with a PMHx of A fib on Eliquis, HFpEF 50%, and HTN who presents with progressive dyspnea on exertion, lazarus LE edema 5/7,  admitted to tele for acute on chronic CHF exacerbation likely in setting of NSTEMI, now found to have nephrotic syndrome     Problem/Plan - 1:  ·  Problem: Nephrotic syndrome.  Plan: Patient found to have nephrotic syndrome (albumin 2.1, protein in urine and peripheral edema).   - Heparin gtt restarted as patient at risk for thrombotic disease as discussed with Dr. Kemp and Michelle, monitor PTT and dose accordingly. Per Dr. Kemp, discussed anticoagulation risks vs. benefits in detail and family does not want to continue anticoagulation upon discharge and is only agreeable to aspirin   - renal consulted, appreciate recs   - patient is not a candidate for kidney biopsy, empiric therapy with immunosuppressive agents, or therapy for amyloid/myeloma, even if present. Family does not prefer diagnostic interventions.   - c/w IV lasix 60mg TID, goal net neg 2L/day   - coreg 3.125 mg BID increased to 6.25 mg BID   - monitor BUN/Creat  - palliative c/s pending.     Problem/Plan - 2:  ·  Problem: CHF (congestive heart failure).  Plan: Pt w/ previous Hx of HFpEF (EF 50-55% on echo 4/2018) presenting with worsening exertional dyspnea, decreased exercise tolerance, orthopnea, increased lazarus LE edema x 3 weeks. CXR w/ pulmonary vascular congestion, elevated cardiac enzymes peaked at 0.78, BNP 9464. Acute systolic CHF exacerbation likely 2/2 ischemia vs Lasix failure vs tachyarrhythmias. Patient given Lasix 60mg IV in ED with minimal urine output   - ECHO w/ Bubble Study 5/8 w/ EF 35-40%, mod conc LVH, LV global hypokinesis, LA mildly dilated, RA dilated, trace MR, mild TR, mild pulm HTN, PASP 42  - Nephrotic syndrome contributing to volume overload , which could be related to minimal change disease, membranous nephropathy, focal sclerosis, or amyloid/myeloma.  - c/w Lasix 60mg IV TID for diuresis, goal net neg 2L/day  - coreg 3.125 mg BID increased to 6.25 mg BID   - Outpt valsartan HELD 2/2 ANNETTE on CKD, consider restarting as BUN/Creat downtrending   - Trop peaked at 0.78. Medical management for NSTEMI at this time per Dr Kemp  - EKG noting Afib w/ LBBB (prior EKGs w/ incomplete LBBB last admission)  - Strict I/Os, daily weights, 1L fluid restrictions  - Goal net neg 2L/day  - palliative c/s pending.     Problem/Plan - 3:  ·  Problem: NSTEMI (non-ST elevated myocardial infarction).  Plan: Pt w/ elevated Troponin on admission to 0.06, complaining of occasional intermittent exertional chest tightness when bending over which relieves with rest and sitting up. No current chest pain, only complaining of epigastric pain.  - Trop peaked at 0.78  - EKG noting Afib w/ LBBB (previous EKG w/ incomplete LBBB)  - S/p ASA/Plavix load. C/w ASA 81mg qd, Plavix 75mg qd   - C/w Heparin drip as patient at risk for thrombotic disease as discussed with Dr. Kemp and Michelle, monitor PTT and dose accordingly. Per Dr. Kemp, discussed anticoagulation risks vs. benefits in detail and family does not want to continue anticoagulation upon discharge and is only agreeable to aspirin   - c/w Lipitor 80mg qd  - coreg 3.125 mg BID increased to 6.25 mg BID   - Repeat EKG Afib w/ LBBB (previous EKG w/ incomplete LBBB)  - Echo w/ newly depressed EF 35-50%, from 50-55% last month.     Problem/Plan - 4:  ·  Problem: ANNETTE (acute kidney injury).  Plan: Patient presenting with Cr 1.90, baseline of 1.4 earlier this year though in prior records as low as 0.8. Likely element of CKD as well.  - FEUr consistent w/ prerenal disease  - Renal US, no hydronephrosis.   - Trend BUN/Crea, 58/1.86 today  - Avoid nephrotoxic agents  - Renally dose all meds  - Outpt valsartan HELD 2/2 ANNETTE on CKD, consider restarting as BUN/Creat downtrending   - F/u Renal Dr Martinez recs.     Problem/Plan - 5:  ·  Problem: Hyponatremia.  Plan: Patient with hyponatremia on admission to 130, ECF volume increased. Pending urine studies, though likely due to acute on chronic CHF exacerbation, hypervolemic hyponatremia.  - Na 134 today  - Serum Osm and Urine Osm WNL   - F/u Renal Dr Martinez recs.     Problem/Plan - 6:  Problem: Atrial fibrillation. Plan: Patient with history of Afib, previously on Cardizem and Eliquis, now on Atenolol for rate control. Patient's Eliquis was discontinued as outpt and placed on ASA. ADR listed to Metoprolol however family states she "got loopy".  - coreg 3.125 mg BID increased to 6.25 mg BID   - Pt taken off Eliquis per Dr Kemp as outpt per family request  - c/w ASA 81mg qd  - C/w Heparin drip as patient at risk for thrombotic disease as discussed with Dr. Kemp and Michelle, monitor PTT and dose accordingly. Per Dr. Kemp, discussed anticoagulation risks vs. benefits in detail and family does not want to continue anticoagulation upon discharge and is only agreeable to aspirin     #Hypertension  Patient with history of HTN on Atenolol, Norvasc, valsartan at home  - Currently normotensive  - coreg 3.125 mg BID increased to 6.25 mg BID   - HOLD Valsartan in setting of ANNETTE on CKD, consider restarting as BUN/Creat downtrending.    Problem/Plan - 7:  ·  Problem: Hypoalbuminemia.  Plan: Patient with albumin of 2.1 on admission with reports of nausea, decreased PO intake for the past few weeks, likely related to fluid overload and poor PO intake.  - Trend CMP  - Nutrition consult.     Problem/Plan - 8:  ·  Problem: Nutrition, metabolism, and development symptoms.  Plan: DASH/TLC diet, 1L fluid restriction  Nutrition consult as above given hypoalbuminemia and decreased appetite  No IVF at this time  Replete electrolytes PRN to K > 4, Mg > 2.     Problem/Plan - 9:  ·  Problem: Need for other prophylactic measure.  Plan: DVT PPX: IV Heparin gtt   C: FULL CODE, Pt's 2 daughters are HCP.

## 2018-05-12 LAB
ANION GAP SERPL CALC-SCNC: 10 MMOL/L — SIGNIFICANT CHANGE UP (ref 5–17)
ANION GAP SERPL CALC-SCNC: 9 MMOL/L — SIGNIFICANT CHANGE UP (ref 5–17)
APTT BLD: 128.4 SEC — CRITICAL HIGH (ref 27.5–37.4)
APTT BLD: 69.8 SEC — HIGH (ref 27.5–37.4)
APTT BLD: 84.5 SEC — HIGH (ref 27.5–37.4)
BUN SERPL-MCNC: 56 MG/DL — HIGH (ref 7–23)
BUN SERPL-MCNC: 59 MG/DL — HIGH (ref 7–23)
CALCIUM SERPL-MCNC: 7.9 MG/DL — LOW (ref 8.4–10.5)
CALCIUM SERPL-MCNC: 8.4 MG/DL — SIGNIFICANT CHANGE UP (ref 8.4–10.5)
CHLORIDE SERPL-SCNC: 94 MMOL/L — LOW (ref 96–108)
CHLORIDE SERPL-SCNC: 96 MMOL/L — SIGNIFICANT CHANGE UP (ref 96–108)
CO2 SERPL-SCNC: 28 MMOL/L — SIGNIFICANT CHANGE UP (ref 22–31)
CO2 SERPL-SCNC: 31 MMOL/L — SIGNIFICANT CHANGE UP (ref 22–31)
CREAT SERPL-MCNC: 1.8 MG/DL — HIGH (ref 0.5–1.3)
CREAT SERPL-MCNC: 1.91 MG/DL — HIGH (ref 0.5–1.3)
GLUCOSE SERPL-MCNC: 113 MG/DL — HIGH (ref 70–99)
GLUCOSE SERPL-MCNC: 125 MG/DL — HIGH (ref 70–99)
HCT VFR BLD CALC: 37.6 % — SIGNIFICANT CHANGE UP (ref 34.5–45)
HGB BLD-MCNC: 12.2 G/DL — SIGNIFICANT CHANGE UP (ref 11.5–15.5)
INR BLD: 1.1 — SIGNIFICANT CHANGE UP (ref 0.88–1.16)
MAGNESIUM SERPL-MCNC: 2.1 MG/DL — SIGNIFICANT CHANGE UP (ref 1.6–2.6)
MAGNESIUM SERPL-MCNC: 2.2 MG/DL — SIGNIFICANT CHANGE UP (ref 1.6–2.6)
MCHC RBC-ENTMCNC: 30.8 PG — SIGNIFICANT CHANGE UP (ref 27–34)
MCHC RBC-ENTMCNC: 32.4 G/DL — SIGNIFICANT CHANGE UP (ref 32–36)
MCV RBC AUTO: 94.9 FL — SIGNIFICANT CHANGE UP (ref 80–100)
PLATELET # BLD AUTO: 202 K/UL — SIGNIFICANT CHANGE UP (ref 150–400)
POTASSIUM SERPL-MCNC: 3.6 MMOL/L — SIGNIFICANT CHANGE UP (ref 3.5–5.3)
POTASSIUM SERPL-MCNC: 4.2 MMOL/L — SIGNIFICANT CHANGE UP (ref 3.5–5.3)
POTASSIUM SERPL-SCNC: 3.6 MMOL/L — SIGNIFICANT CHANGE UP (ref 3.5–5.3)
POTASSIUM SERPL-SCNC: 4.2 MMOL/L — SIGNIFICANT CHANGE UP (ref 3.5–5.3)
PROTHROM AB SERPL-ACNC: 12.2 SEC — SIGNIFICANT CHANGE UP (ref 9.8–12.7)
RBC # BLD: 3.96 M/UL — SIGNIFICANT CHANGE UP (ref 3.8–5.2)
RBC # FLD: 14.1 % — SIGNIFICANT CHANGE UP (ref 10.3–16.9)
SODIUM SERPL-SCNC: 134 MMOL/L — LOW (ref 135–145)
SODIUM SERPL-SCNC: 134 MMOL/L — LOW (ref 135–145)
WBC # BLD: 7.3 K/UL — SIGNIFICANT CHANGE UP (ref 3.8–10.5)
WBC # FLD AUTO: 7.3 K/UL — SIGNIFICANT CHANGE UP (ref 3.8–10.5)

## 2018-05-12 PROCEDURE — 71045 X-RAY EXAM CHEST 1 VIEW: CPT | Mod: 26

## 2018-05-12 PROCEDURE — 99232 SBSQ HOSP IP/OBS MODERATE 35: CPT

## 2018-05-12 PROCEDURE — 99233 SBSQ HOSP IP/OBS HIGH 50: CPT

## 2018-05-12 RX ORDER — HEPARIN SODIUM 5000 [USP'U]/ML
700 INJECTION INTRAVENOUS; SUBCUTANEOUS
Qty: 25000 | Refills: 0 | Status: DISCONTINUED | OUTPATIENT
Start: 2018-05-12 | End: 2018-05-15

## 2018-05-12 RX ORDER — ALBUMIN HUMAN 25 %
50 VIAL (ML) INTRAVENOUS EVERY 8 HOURS
Qty: 0 | Refills: 0 | Status: DISCONTINUED | OUTPATIENT
Start: 2018-05-12 | End: 2018-05-17

## 2018-05-12 RX ORDER — CARVEDILOL PHOSPHATE 80 MG/1
3.12 CAPSULE, EXTENDED RELEASE ORAL EVERY 12 HOURS
Qty: 0 | Refills: 0 | Status: DISCONTINUED | OUTPATIENT
Start: 2018-05-12 | End: 2018-05-14

## 2018-05-12 RX ORDER — BUMETANIDE 0.25 MG/ML
2 INJECTION INTRAMUSCULAR; INTRAVENOUS EVERY 8 HOURS
Qty: 0 | Refills: 0 | Status: DISCONTINUED | OUTPATIENT
Start: 2018-05-12 | End: 2018-05-17

## 2018-05-12 RX ORDER — ATORVASTATIN CALCIUM 80 MG/1
40 TABLET, FILM COATED ORAL AT BEDTIME
Qty: 0 | Refills: 0 | Status: DISCONTINUED | OUTPATIENT
Start: 2018-05-12 | End: 2018-05-17

## 2018-05-12 RX ORDER — ONDANSETRON 8 MG/1
4 TABLET, FILM COATED ORAL ONCE
Qty: 0 | Refills: 0 | Status: COMPLETED | OUTPATIENT
Start: 2018-05-12 | End: 2018-05-12

## 2018-05-12 RX ORDER — POTASSIUM CHLORIDE 20 MEQ
40 PACKET (EA) ORAL ONCE
Qty: 0 | Refills: 0 | Status: COMPLETED | OUTPATIENT
Start: 2018-05-12 | End: 2018-05-12

## 2018-05-12 RX ADMIN — HEPARIN SODIUM 7 UNIT(S)/HR: 5000 INJECTION INTRAVENOUS; SUBCUTANEOUS at 21:52

## 2018-05-12 RX ADMIN — Medication 30 MILLILITER(S): at 09:27

## 2018-05-12 RX ADMIN — HEPARIN SODIUM 7 UNIT(S)/HR: 5000 INJECTION INTRAVENOUS; SUBCUTANEOUS at 12:31

## 2018-05-12 RX ADMIN — Medication 50 MILLILITER(S): at 13:19

## 2018-05-12 RX ADMIN — CLOPIDOGREL BISULFATE 75 MILLIGRAM(S): 75 TABLET, FILM COATED ORAL at 09:27

## 2018-05-12 RX ADMIN — Medication 50 MILLILITER(S): at 21:51

## 2018-05-12 RX ADMIN — Medication 40 MILLIEQUIVALENT(S): at 07:20

## 2018-05-12 RX ADMIN — Medication 81 MILLIGRAM(S): at 09:27

## 2018-05-12 RX ADMIN — BUMETANIDE 2 MILLIGRAM(S): 0.25 INJECTION INTRAMUSCULAR; INTRAVENOUS at 23:24

## 2018-05-12 RX ADMIN — CARVEDILOL PHOSPHATE 3.12 MILLIGRAM(S): 80 CAPSULE, EXTENDED RELEASE ORAL at 18:06

## 2018-05-12 RX ADMIN — CARVEDILOL PHOSPHATE 6.25 MILLIGRAM(S): 80 CAPSULE, EXTENDED RELEASE ORAL at 07:21

## 2018-05-12 RX ADMIN — ATORVASTATIN CALCIUM 40 MILLIGRAM(S): 80 TABLET, FILM COATED ORAL at 21:52

## 2018-05-12 RX ADMIN — ONDANSETRON 4 MILLIGRAM(S): 8 TABLET, FILM COATED ORAL at 02:41

## 2018-05-12 RX ADMIN — BUMETANIDE 2 MILLIGRAM(S): 0.25 INJECTION INTRAMUSCULAR; INTRAVENOUS at 15:28

## 2018-05-12 RX ADMIN — Medication 30 MILLILITER(S): at 03:00

## 2018-05-12 RX ADMIN — Medication 80 MILLIGRAM(S): at 07:20

## 2018-05-12 NOTE — PROGRESS NOTE ADULT - PROBLEM SELECTOR PLAN 6
Patient with history of Afib, previously on Cardizem and Eliquis, now on Atenolol for rate control. Patient's Eliquis was discontinued as outpt and placed on ASA. ADR listed to Metoprolol however family states she "got loopy".  - c/w Coreg 6.25 mg BID   - Pt taken off Eliquis per Dr Kemp as outpt per family request  - c/w ASA 81mg qd  - C/w Heparin drip as patient at risk for thrombotic disease as discussed with Dr. Kemp and Michelle, monitor PTT and dose accordingly. Per Dr. Kemp, discussed anticoagulation risks vs. benefits in detail and family does not want to continue anticoagulation upon discharge and is only agreeable to aspirin     #Hypertension  Patient with history of HTN on Atenolol, Norvasc, valsartan at home  - Currently normotensive  - c/w Coreg 6.25 mg BID   - HOLD Valsartan in setting of ANNETTE on CKD, consider restarting as BUN/Creat downtrending

## 2018-05-12 NOTE — PROGRESS NOTE ADULT - PROBLEM SELECTOR PLAN 4
Patient presenting with Cr 1.90, baseline of 1.4 earlier this year though in prior records as low as 0.8. Likely element of CKD as well.  - FEUr consistent w/ prerenal disease  - Renal US, no hydronephrosis.   - Trend BUN/Crea, 59/1.80 today  - Avoid nephrotoxic agents  - Renally dose all meds  - Outpt valsartan HELD 2/2 ANNETTE on CKD, consider restarting as BUN/Creat downtrending   - F/u Renal Dr Martinez recs

## 2018-05-12 NOTE — PROGRESS NOTE ADULT - SUBJECTIVE AND OBJECTIVE BOX
[facility] DIVISION OF KIDNEY DISEASES AND HYPERTENSION -- FOLLOW UP NOTE  --------------------------------------------------------------------------------  HPI:  89yo F with anasarca 2/2 decompensated HF and newly diagnosed nephrotic syndrome with improving ANNETTE:        PAST HISTORY  --------------------------------------------------------------------------------  No significant changes to PMH, PSH, FHx, SHx, unless otherwise noted    ALLERGIES & MEDICATIONS  --------------------------------------------------------------------------------  Allergies    metoprolol (Unknown)  penicillin (Rash)    Intolerances      Standing Inpatient Medications  aspirin  chewable 81 milliGRAM(s) Oral daily  atorvastatin 40 milliGRAM(s) Oral at bedtime  carvedilol 6.25 milliGRAM(s) Oral every 12 hours  clopidogrel Tablet 75 milliGRAM(s) Oral daily  furosemide   Injectable 80 milliGRAM(s) IV Push every 8 hours  heparin  Infusion 1000 Unit(s)/Hr IV Continuous <Continuous>  metolazone 5 milliGRAM(s) Oral daily    PRN Inpatient Medications  acetaminophen   Tablet. 650 milliGRAM(s) Oral every 6 hours PRN  aluminum hydroxide/magnesium hydroxide/simethicone Suspension 30 milliLiter(s) Oral every 6 hours PRN      REVIEW OF SYSTEMS  --------------------------------------------------------------------------------  Gen: No weight changes, fatigue, fevers/chills, weakness  Skin: No rashes  Head/Eyes/Ears/Mouth: No headache; Normal hearing; Normal vision w/o blurriness; No sinus pain/discomfort, sore throat  Respiratory: No dyspnea, cough, wheezing, hemoptysis  CV: No chest pain, PND, orthopnea  GI: No abdominal pain, diarrhea, constipation, nausea, vomiting, melena, hematochezia  : No increased frequency, dysuria, hematuria, nocturia  MSK: No joint pain/swelling; no back pain; no edema  Neuro: No dizziness/lightheadedness, weakness, seizures, numbness, tingling  Heme: No easy bruising or bleeding  Endo: No heat/cold intolerance  Psych: No significant nervousness, anxiety, stress, depression    All other systems were reviewed and are negative, except as noted.    VITALS/PHYSICAL EXAM  --------------------------------------------------------------------------------  T(C): 37 (05-12-18 @ 09:19), Max: 37 (05-12-18 @ 09:19)  HR: 99 (05-12-18 @ 08:10) (72 - 112)  BP: 95/54 (05-12-18 @ 08:10) (95/54 - 132/70)  RR: 16 (05-12-18 @ 08:10) (16 - 16)  SpO2: 95% (05-12-18 @ 08:10) (92% - 98%)  Wt(kg): --        05-11-18 @ 07:01  -  05-12-18 @ 07:00  --------------------------------------------------------  IN: 976 mL / OUT: 1450 mL / NET: -474 mL    05-12-18 @ 07:01  -  05-12-18 @ 11:20  --------------------------------------------------------  IN: 360 mL / OUT: 100 mL / NET: 260 mL      Physical Exam:  	Gen: NAD, well-appearing  	HEENT: PERRL, supple neck, clear oropharynx  	Pulm: CTA B/L  	CV: RRR, S1S2; no rub  	Back: No spinal or CVA tenderness; no sacral edema  	Abd: +BS, soft, nontender/nondistended  	: No suprapubic tenderness  	UE: Warm, FROM, no clubbing, intact strength; no edema; no asterixis  	LE: Warm, FROM, no clubbing, intact strength; no edema  	Neuro: No focal deficits, intact gait  	Psych: Normal affect and mood  	Skin: Warm, without rashes  	Vascular access:    LABS/STUDIES  --------------------------------------------------------------------------------              12.2   7.3   >-----------<  202      [05-12-18 @ 02:46]              37.6     134  |  96  |  59  ----------------------------<  113      [05-12-18 @ 02:46]  3.6   |  28  |  1.80        Ca     7.9     [05-12-18 @ 02:46]      Mg     2.1     [05-12-18 @ 02:46]      PT/INR: PT 12.2 , INR 1.10       [05-12-18 @ 02:46]  PTT: 128.4      [05-12-18 @ 11:11]      Creatinine Trend:  SCr 1.80 [05-12 @ 02:46]  SCr 1.86 [05-11 @ 07:07]  SCr 1.88 [05-10 @ 06:24]  SCr 2.02 [05-09 @ 02:32]  SCr 2.14 [05-08 @ 02:28]    Urinalysis - [05-09-18 @ 07:59]      Color Straw / Appearance Hazy / SG 1.020 / pH 6.5      Gluc NEGATIVE / Ketone NEGATIVE  / Bili Negative / Urobili 0.2       Blood Trace / Protein >=300 / Leuk Est NEGATIVE / Nitrite NEGATIVE      RBC < 5 / WBC < 5 / Hyaline 0-2 / Gran  / Sq Epi  / Non Sq Epi 0-5 / Bacteria Present    Urine Creatinine 30      [05-09-18 @ 07:59]  Urine Albumin 185.5      [05-09-18 @ 07:59]  Urine Sodium 64      [05-09-18 @ 07:59]  Urine Urea Nitrogen 258      [05-09-18 @ 07:59]  Urine Osmolality 270      [05-09-18 @ 07:59]    HbA1c 6.1      [04-08-18 @ 04:38]  TSH 1.371      [04-08-18 @ 04:37]  Lipid: chol 134, TG 88, HDL 56, LDL 60      [04-08-18 @ 04:37] [facility] DIVISION OF KIDNEY DISEASES AND HYPERTENSION -- FOLLOW UP NOTE  --------------------------------------------------------------------------------  HPI:  89yo F with anasarca 2/2 decompensated HF and newly diagnosed nephrotic syndrome with improving ANNETTE:  Feels more swollen, still with significant orthopnea, BACK and weakness/fatigue when walks to bathroom.  No urinary complaints. 2 episodes of diarrhea between yesterday and today, occasional nausea, decreased appetite.  All other ROS negative    PAST HISTORY  --------------------------------------------------------------------------------  No significant changes to PMH, PSH, FHx, SHx, unless otherwise noted    ALLERGIES & MEDICATIONS  --------------------------------------------------------------------------------  Allergies    metoprolol (Unknown)  penicillin (Rash)    Intolerances      Standing Inpatient Medications  aspirin  chewable 81 milliGRAM(s) Oral daily  atorvastatin 40 milliGRAM(s) Oral at bedtime  carvedilol 6.25 milliGRAM(s) Oral every 12 hours  clopidogrel Tablet 75 milliGRAM(s) Oral daily  furosemide   Injectable 80 milliGRAM(s) IV Push every 8 hours  heparin  Infusion 1000 Unit(s)/Hr IV Continuous <Continuous>  metolazone 5 milliGRAM(s) Oral daily    PRN Inpatient Medications  acetaminophen   Tablet. 650 milliGRAM(s) Oral every 6 hours PRN  aluminum hydroxide/magnesium hydroxide/simethicone Suspension 30 milliLiter(s) Oral every 6 hours PRN      REVIEW OF SYSTEMS  --------------------------------------------------------------------------------  Gen: No weight changes, + fatigue and weakness  Skin: No rashes  Head/Eyes/Ears/Mouth: No headache; Normal hearing; Normal vision w/o blurriness; No sinus pain/discomfort, sore throat  Respiratory: + dyspnea on exertion, cough, wheezing, hemoptysis  CV: No chest pain, PND, orthopnea  GI: + epigastric abdominal discomfort for 3 months, 2x diarrhea, +nausea, novomiting, melena, hematochezia  : No increased frequency, dysuria, hematuria, nocturia  MSK: No joint pain/swelling; no back pain; + edema  Neuro: No dizziness/lightheadedness, weakness, seizures, numbness, tingling  Heme: No easy bruising or bleeding  Endo: No heat/cold intolerance  Psych: No significant nervousness, anxiety, stress, depression    All other systems were reviewed and are negative, except as noted.    VITALS/PHYSICAL EXAM  --------------------------------------------------------------------------------  T(C): 37 (05-12-18 @ 09:19), Max: 37 (05-12-18 @ 09:19)  HR: 99 (05-12-18 @ 08:10) (72 - 112)  BP: 95/54 (05-12-18 @ 08:10) (95/54 - 132/70)  RR: 16 (05-12-18 @ 08:10) (16 - 16)  SpO2: 95% (05-12-18 @ 08:10) (92% - 98%)  Wt(kg): --        05-11-18 @ 07:01  -  05-12-18 @ 07:00  --------------------------------------------------------  IN: 976 mL / OUT: 1450 mL / NET: -474 mL    05-12-18 @ 07:01  -  05-12-18 @ 11:20  --------------------------------------------------------  IN: 360 mL / OUT: 100 mL / NET: 260 mL      Physical Exam:  	Gen: NAD, obese  	HEENT: PERRL, supple neck, clear oropharynx  	Pulm: b/l crackles, speaking in full sentences  	CV: RRR, S1S2; no rub  	Abd: +BS, soft, nontender/nondistended  	: No suprapubic tenderness  	UE: Warm,i + edema no asterixis  	LE: Warm, 2-3+ edema  	Neuro: No focal deficits  	Psych: Normal affect and mood  	Skin: Warm, without rashes, pale    LABS/STUDIES  --------------------------------------------------------------------------------              12.2   7.3   >-----------<  202      [05-12-18 @ 02:46]              37.6     134  |  96  |  59  ----------------------------<  113      [05-12-18 @ 02:46]  3.6   |  28  |  1.80        Ca     7.9     [05-12-18 @ 02:46]      Mg     2.1     [05-12-18 @ 02:46]      PT/INR: PT 12.2 , INR 1.10       [05-12-18 @ 02:46]  PTT: 128.4      [05-12-18 @ 11:11]      Creatinine Trend:  SCr 1.80 [05-12 @ 02:46]  SCr 1.86 [05-11 @ 07:07]  SCr 1.88 [05-10 @ 06:24]  SCr 2.02 [05-09 @ 02:32]  SCr 2.14 [05-08 @ 02:28]    Urinalysis - [05-09-18 @ 07:59]      Color Straw / Appearance Hazy / SG 1.020 / pH 6.5      Gluc NEGATIVE / Ketone NEGATIVE  / Bili Negative / Urobili 0.2       Blood Trace / Protein >=300 / Leuk Est NEGATIVE / Nitrite NEGATIVE      RBC < 5 / WBC < 5 / Hyaline 0-2 / Gran  / Sq Epi  / Non Sq Epi 0-5 / Bacteria Present    Urine Creatinine 30      [05-09-18 @ 07:59]  Urine Albumin 185.5      [05-09-18 @ 07:59]  Urine Sodium 64      [05-09-18 @ 07:59]  Urine Urea Nitrogen 258      [05-09-18 @ 07:59]  Urine Osmolality 270      [05-09-18 @ 07:59]    HbA1c 6.1      [04-08-18 @ 04:38]  TSH 1.371      [04-08-18 @ 04:37]  Lipid: chol 134, TG 88, HDL 56, LDL 60      [04-08-18 @ 04:37]

## 2018-05-12 NOTE — PROGRESS NOTE ADULT - SUBJECTIVE AND OBJECTIVE BOX
CARDIOLOGY NP PROGRESS NOTE    Subjective: Patient seen and examined at bedside. Patient reports having diarrhea last night and a decreased appetite today associated with nausea for which Zofran did not help. Still exhibits BACK with ambulation to the bathroom. Denies CP, dizziness/diaphoresis, vomiting palpitations. Remainder ROS otherwise negative.    Overnight Events: Lasix increased from 60mg IV TID to 80mg IV TID. Patient received Zofran for nausea.     TELEMETRY: Afib (50s-100s), HARI 130s on ambulation       VITAL SIGNS:  T(C): 36.9 (05-12-18 @ 05:18), Max: 36.9 (05-12-18 @ 05:18)  HR: 72 (05-12-18 @ 06:09) (72 - 112)  BP: 126/69 (05-12-18 @ 06:09) (100/74 - 132/70)  RR: 16 (05-12-18 @ 06:09) (16 - 16)  SpO2: 98% (05-12-18 @ 06:09) (92% - 98%)  Wt(kg): --    I&O's Summary    11 May 2018 07:01  -  12 May 2018 07:00  --------------------------------------------------------  IN: 976 mL / OUT: 1450 mL / NET: -474 mL          PHYSICAL EXAM:    General: A/ox 3, No acute Distress  Neck: Supple, NO JVD  Cardiac: S1 S2, No M/R/G  Pulmonary: Diminished breath sounds throughout with Bibasilar crackles   Abdomen: Soft, Non -tender, +BS x 4 quads  Extremities: +3 pitting edema BL LE extending to knees, no improvement from yesterday. No rashes noted   Neuro: A/o x 3, No focal deficits            LABS:                          12.2   7.3   )-----------( 202      ( 12 May 2018 02:46 )             37.6                              05-12    134<L>  |  96  |  59<H>  ----------------------------<  113<H>  3.6   |  28  |  1.80<H>    Ca    7.9<L>      12 May 2018 02:46  Mg     2.1     05-12                              PT/INR - ( 12 May 2018 02:46 )   PT: 12.2 sec;   INR: 1.10          PTT - ( 12 May 2018 02:46 )  PTT:84.5 sec  CAPILLARY BLOOD GLUCOSE                Allergies:  metoprolol (Unknown)  penicillin (Rash)    MEDICATIONS  (STANDING):  aspirin  chewable 81 milliGRAM(s) Oral daily  atorvastatin 80 milliGRAM(s) Oral at bedtime  carvedilol 6.25 milliGRAM(s) Oral every 12 hours  clopidogrel Tablet 75 milliGRAM(s) Oral daily  furosemide   Injectable 80 milliGRAM(s) IV Push every 8 hours  heparin  Infusion 1000 Unit(s)/Hr (10 mL/Hr) IV Continuous <Continuous>    MEDICATIONS  (PRN):  acetaminophen   Tablet. 650 milliGRAM(s) Oral every 6 hours PRN Moderate Pain (4 - 6)  aluminum hydroxide/magnesium hydroxide/simethicone Suspension 30 milliLiter(s) Oral every 6 hours PRN Dyspepsia

## 2018-05-12 NOTE — PROGRESS NOTE ADULT - ASSESSMENT
89yo F with anasarca 2/2 decompensated HF and newly diagnosed nephrotic syndrome with improving ANNETTE:    # ANNETTE with nephrotic syndrome  - patient and daughter prefer conservative care, no further w/u just symptomatic treatment  - Cr improving, non oliguric    # Anasarca/decomp HF  - advise adding albumin to diuretic regimen d/w cardiology attending, Only net negative 2L in last 5 days with wt stable the last 2 days on high doses of lasix. Will change to bumex 2mg IV BID and metolazone 5mg with concentrated albumin given prior to bumex doses, assess UO, redose Bumex/metolazone if needed to keep goal UO 100ml/hr net negative 1.5L-2L as bp tolerates in next 24hrs. Would decrease carvedilol if needed if bp remains low.    # Hyponatremia - mild, asymp, 2/2 volume overload, check BID along with K/Mg while on diuresis

## 2018-05-12 NOTE — PROGRESS NOTE ADULT - PROBLEM SELECTOR PLAN 3
Pt w/ elevated Troponin on admission to 0.06, complaining of occasional intermittent exertional chest tightness when bending over which relieves with rest and sitting up. No current chest pain, only complaining of epigastric pain.  - Trop peaked at 0.78  - EKG noting Afib w/ LBBB (previous EKG w/ incomplete LBBB)  - S/p ASA/Plavix load. C/w ASA 81mg qd, Plavix 75mg qd   - C/w Heparin drip as patient at risk for thrombotic disease as discussed with Dr. Kemp and Michelle, monitor PTT and dose accordingly. Per Dr. Kemp, discussed anticoagulation risks vs. benefits in detail and family does not want to continue anticoagulation upon discharge and is only agreeable to aspirin   - c/w Lipitor 80mg qd  - c/w Coreg 6.25 mg BID   - Repeat EKG Afib w/ LBBB (previous EKG w/ incomplete LBBB)  - Echo w/ newly depressed EF 35-50%, from 50-55% last month Pt w/ elevated Troponin on admission to 0.06, complaining of occasional intermittent exertional chest tightness when bending over which relieves with rest and sitting up. No current chest pain, only complaining of epigastric pain.  - Trop peaked at 0.78  - EKG noting Afib w/ LBBB (previous EKG w/ incomplete LBBB)  - S/p ASA/Plavix load. C/w ASA 81mg qd, Plavix 75mg qd   - C/w Heparin drip as patient at risk for thrombotic disease as discussed with Dr. Kemp and Michelle, monitor PTT and dose accordingly. Per Dr. Kemp, discussed anticoagulation risks vs. benefits in detail and family does not want to continue anticoagulation upon discharge and is only agreeable to aspirin   - Lipitor 80 mg decreased to 40mg QHS   - c/w Coreg 6.25 mg BID   - Repeat EKG Afib w/ LBBB (previous EKG w/ incomplete LBBB)  - Echo w/ newly depressed EF 35-50%, from 50-55% last month

## 2018-05-12 NOTE — PROGRESS NOTE ADULT - PROBLEM SELECTOR PLAN 7
Patient with albumin of 2.1 on admission with reports of nausea, decreased PO intake for the past few weeks, likely related to fluid overload and poor PO intake.  - Trend CMP  - Nutrition consult Patient with albumin of 2.1 on admission with reports of nausea, decreased PO intake for the past few weeks, likely related to fluid overload and poor PO intake.  - Concentrated albumin 25% added to regimen to be given 15 min prior to bumex doses  - Trend CMP  - Nutrition consult

## 2018-05-12 NOTE — PROGRESS NOTE ADULT - PROBLEM SELECTOR PLAN 1
Patient found to have nephrotic syndrome (albumin 2.1, protein in urine and peripheral edema).   - Heparin gtt restarted as patient at risk for thrombotic disease as discussed with Dr. Kemp and Michelle, monitor PTT and dose accordingly. Per Dr. Kemp, discussed anticoagulation risks vs. benefits in detail and family does not want to continue anticoagulation upon discharge and are only agreeable to aspirin   - renal consulted, appreciate recs   - patient is not a candidate for kidney biopsy, empiric therapy with immunosuppressive agents, or therapy for amyloid/myeloma, even if present. Family does not prefer diagnostic interventions.   - IV lasix increased from 60mg TID to 80mg TID, goal net neg 2L/day   - c/w Coreg 6.25 mg BID   - monitor BUN/Creat  - palliative c/s pending Patient found to have nephrotic syndrome (albumin 2.1, protein in urine and peripheral edema).   - Heparin gtt restarted as patient at risk for thrombotic disease as discussed with Dr. Kemp and Michelle, monitor PTT and dose accordingly. Per Dr. Kemp, discussed anticoagulation risks vs. benefits in detail and family does not want to continue anticoagulation upon discharge and are only agreeable to aspirin   - renal consulted, appreciate recs   - patient is not a candidate for kidney biopsy, empiric therapy with immunosuppressive agents, or therapy for amyloid/myeloma, even if present. Family does not prefer diagnostic interventions.   - IV Lasix 80mg TID switched to IV Bumex 2mg TID   - Metolazone 5mg QD added to regimen prior to Bumex  - Concentrated albumin 25% added to regimen to be given 15 min prior to bumex doses  - Goal net negative 1.5L-2L   - Monitor Lytes BID and replete PRN  - c/w Coreg 6.25 mg BID   - monitor BUN/Creat  - palliative c/s pending

## 2018-05-12 NOTE — PROGRESS NOTE ADULT - ASSESSMENT
89yo F with a PMHx of A fib on Eliquis, HFpEF 50%, and HTN who presents with progressive dyspnea on exertion, lazarus LE edema 5/7,  admitted to tele for acute on chronic CHF exacerbation likely in setting of NSTEMI, now found to have nephrotic syndrome

## 2018-05-12 NOTE — PROGRESS NOTE ADULT - PROBLEM SELECTOR PLAN 2
Pt w/ previous Hx of HFpEF (EF 50-55% on echo 4/2018) presenting with worsening exertional dyspnea, decreased exercise tolerance, orthopnea, increased lazarus LE edema x 3 weeks. CXR w/ pulmonary vascular congestion, elevated cardiac enzymes peaked at 0.78, BNP 9464. Acute systolic CHF exacerbation likely 2/2 ischemia vs Lasix failure vs tachyarrhythmias. Patient given Lasix 60mg IV in ED with minimal urine output   - ECHO w/ Bubble Study 5/8 w/ EF 35-40%, mod conc LVH, LV global hypokinesis, LA mildly dilated, RA dilated, trace MR, mild TR, mild pulm HTN, PASP 42  - Nephrotic syndrome contributing to volume overload , which could be related to minimal change disease, membranous nephropathy, focal sclerosis, or amyloid/myeloma.  - IV lasix increased from 60mg TID to 80mg TID, goal net neg 2L/day   - c/w Coreg 6.25 mg BID   - Outpt valsartan HELD 2/2 ANNETTE on CKD, consider restarting as BUN/Creat downtrending   - Trop peaked at 0.78. Medical management for NSTEMI at this time per Dr Kemp  - EKG noting Afib w/ LBBB (prior EKGs w/ incomplete LBBB last admission)  - Strict I/Os, daily weights, 1L fluid restrictions  - Goal net neg 2L/day  - palliative c/s pending Pt w/ previous Hx of HFpEF (EF 50-55% on echo 4/2018) presenting with worsening exertional dyspnea, decreased exercise tolerance, orthopnea, increased lazarus LE edema x 3 weeks. CXR w/ pulmonary vascular congestion, elevated cardiac enzymes peaked at 0.78, BNP 9464. Acute systolic CHF exacerbation likely 2/2 ischemia vs Lasix failure vs tachyarrhythmias. Patient given Lasix 60mg IV in ED with minimal urine output   - ECHO w/ Bubble Study 5/8 w/ EF 35-40%, mod conc LVH, LV global hypokinesis, LA mildly dilated, RA dilated, trace MR, mild TR, mild pulm HTN, PASP 42  - Nephrotic syndrome contributing to volume overload , which could be related to minimal change disease, membranous nephropathy, focal sclerosis, or amyloid/myeloma.  - IV Lasix 80mg TID switched to IV Bumex 2mg TID   - Metolazone 5mg QD added to regimen prior to Bumex  - Concentrated albumin 25% added to regimen to be given 15 min prior to bumex doses  - Goal net negative 1.5L-2L   - Monitor Lytes BID and replete PRN  - c/w Coreg 6.25 mg BID   - Outpt valsartan HELD 2/2 ANNETTE on CKD, consider restarting as BUN/Creat downtrending   - Trop peaked at 0.78. Medical management for NSTEMI at this time per Dr Kemp  - EKG noting Afib w/ LBBB (prior EKGs w/ incomplete LBBB last admission)  - Strict I/Os, daily weights, 1L fluid restrictions  - palliative c/s pending

## 2018-05-13 LAB
ANION GAP SERPL CALC-SCNC: 13 MMOL/L — SIGNIFICANT CHANGE UP (ref 5–17)
APTT BLD: 62.6 SEC — HIGH (ref 27.5–37.4)
APTT BLD: 64.9 SEC — HIGH (ref 27.5–37.4)
BUN SERPL-MCNC: 52 MG/DL — HIGH (ref 7–23)
CALCIUM SERPL-MCNC: 8.7 MG/DL — SIGNIFICANT CHANGE UP (ref 8.4–10.5)
CHLORIDE SERPL-SCNC: 93 MMOL/L — LOW (ref 96–108)
CO2 SERPL-SCNC: 27 MMOL/L — SIGNIFICANT CHANGE UP (ref 22–31)
CREAT ?TM UR-MCNC: 26 MG/DL — SIGNIFICANT CHANGE UP
CREAT ?TM UR-MCNC: 28 MG/DL — SIGNIFICANT CHANGE UP
CREAT SERPL-MCNC: 1.79 MG/DL — HIGH (ref 0.5–1.3)
GLUCOSE BLDC GLUCOMTR-MCNC: 122 MG/DL — HIGH (ref 70–99)
GLUCOSE SERPL-MCNC: 109 MG/DL — HIGH (ref 70–99)
HCT VFR BLD CALC: 40.3 % — SIGNIFICANT CHANGE UP (ref 34.5–45)
HGB BLD-MCNC: 13.1 G/DL — SIGNIFICANT CHANGE UP (ref 11.5–15.5)
INR BLD: 1.01 — SIGNIFICANT CHANGE UP (ref 0.88–1.16)
MAGNESIUM SERPL-MCNC: 2.3 MG/DL — SIGNIFICANT CHANGE UP (ref 1.6–2.6)
MCHC RBC-ENTMCNC: 30.3 PG — SIGNIFICANT CHANGE UP (ref 27–34)
MCHC RBC-ENTMCNC: 32.5 G/DL — SIGNIFICANT CHANGE UP (ref 32–36)
MCV RBC AUTO: 93.3 FL — SIGNIFICANT CHANGE UP (ref 80–100)
OSMOLALITY UR: 259 MOSMOL/KG — SIGNIFICANT CHANGE UP (ref 100–650)
PLATELET # BLD AUTO: 223 K/UL — SIGNIFICANT CHANGE UP (ref 150–400)
POTASSIUM SERPL-MCNC: 4.1 MMOL/L — SIGNIFICANT CHANGE UP (ref 3.5–5.3)
POTASSIUM SERPL-SCNC: 4.1 MMOL/L — SIGNIFICANT CHANGE UP (ref 3.5–5.3)
PROTHROM AB SERPL-ACNC: 11.2 SEC — SIGNIFICANT CHANGE UP (ref 9.8–12.7)
RBC # BLD: 4.32 M/UL — SIGNIFICANT CHANGE UP (ref 3.8–5.2)
RBC # FLD: 14.1 % — SIGNIFICANT CHANGE UP (ref 10.3–16.9)
SODIUM SERPL-SCNC: 133 MMOL/L — LOW (ref 135–145)
SODIUM UR-SCNC: 62 MMOL/L — SIGNIFICANT CHANGE UP
SODIUM UR-SCNC: 72 MMOL/L — SIGNIFICANT CHANGE UP
UUN UR-MCNC: 177 MG/DL — SIGNIFICANT CHANGE UP
WBC # BLD: 6.3 K/UL — SIGNIFICANT CHANGE UP (ref 3.8–10.5)
WBC # FLD AUTO: 6.3 K/UL — SIGNIFICANT CHANGE UP (ref 3.8–10.5)

## 2018-05-13 PROCEDURE — 99233 SBSQ HOSP IP/OBS HIGH 50: CPT

## 2018-05-13 RX ORDER — ONDANSETRON 8 MG/1
4 TABLET, FILM COATED ORAL ONCE
Qty: 0 | Refills: 0 | Status: COMPLETED | OUTPATIENT
Start: 2018-05-13 | End: 2018-05-13

## 2018-05-13 RX ORDER — IPRATROPIUM/ALBUTEROL SULFATE 18-103MCG
3 AEROSOL WITH ADAPTER (GRAM) INHALATION ONCE
Qty: 0 | Refills: 0 | Status: COMPLETED | OUTPATIENT
Start: 2018-05-13 | End: 2018-05-13

## 2018-05-13 RX ADMIN — CLOPIDOGREL BISULFATE 75 MILLIGRAM(S): 75 TABLET, FILM COATED ORAL at 10:05

## 2018-05-13 RX ADMIN — BUMETANIDE 2 MILLIGRAM(S): 0.25 INJECTION INTRAMUSCULAR; INTRAVENOUS at 07:44

## 2018-05-13 RX ADMIN — Medication 50 MILLILITER(S): at 13:08

## 2018-05-13 RX ADMIN — Medication 50 MILLILITER(S): at 22:08

## 2018-05-13 RX ADMIN — Medication 81 MILLIGRAM(S): at 10:05

## 2018-05-13 RX ADMIN — Medication 650 MILLIGRAM(S): at 14:55

## 2018-05-13 RX ADMIN — Medication 3 MILLILITER(S): at 02:16

## 2018-05-13 RX ADMIN — Medication 50 MILLILITER(S): at 06:11

## 2018-05-13 RX ADMIN — BUMETANIDE 2 MILLIGRAM(S): 0.25 INJECTION INTRAMUSCULAR; INTRAVENOUS at 23:26

## 2018-05-13 RX ADMIN — CARVEDILOL PHOSPHATE 3.12 MILLIGRAM(S): 80 CAPSULE, EXTENDED RELEASE ORAL at 17:39

## 2018-05-13 RX ADMIN — ATORVASTATIN CALCIUM 40 MILLIGRAM(S): 80 TABLET, FILM COATED ORAL at 21:35

## 2018-05-13 RX ADMIN — CARVEDILOL PHOSPHATE 3.12 MILLIGRAM(S): 80 CAPSULE, EXTENDED RELEASE ORAL at 06:12

## 2018-05-13 RX ADMIN — Medication 650 MILLIGRAM(S): at 15:21

## 2018-05-13 RX ADMIN — ONDANSETRON 4 MILLIGRAM(S): 8 TABLET, FILM COATED ORAL at 20:50

## 2018-05-13 RX ADMIN — BUMETANIDE 2 MILLIGRAM(S): 0.25 INJECTION INTRAMUSCULAR; INTRAVENOUS at 14:55

## 2018-05-13 NOTE — PROGRESS NOTE ADULT - SUBJECTIVE AND OBJECTIVE BOX
[facility] DIVISION OF KIDNEY DISEASES AND HYPERTENSION -- FOLLOW UP NOTE  --------------------------------------------------------------------------------  HPI:  89yo F with anasarca 2/2 decompensated HF and newly diagnosed nephrotic syndrome with improving ANNETTE:  Feeling better, less SOB and notable decrease in LE swelling, urinating frequently.  All other ROS negative    PAST HISTORY  --------------------------------------------------------------------------------  No significant changes to PMH, PSH, FHx, SHx, unless otherwise noted    ALLERGIES & MEDICATIONS  --------------------------------------------------------------------------------  Allergies    metoprolol (Unknown)  penicillin (Rash)    Intolerances      Standing Inpatient Medications  aspirin  chewable 81 milliGRAM(s) Oral daily  atorvastatin 40 milliGRAM(s) Oral at bedtime  carvedilol 6.25 milliGRAM(s) Oral every 12 hours  clopidogrel Tablet 75 milliGRAM(s) Oral daily  furosemide   Injectable 80 milliGRAM(s) IV Push every 8 hours  heparin  Infusion 1000 Unit(s)/Hr IV Continuous <Continuous>  metolazone 5 milliGRAM(s) Oral daily    PRN Inpatient Medications  acetaminophen   Tablet. 650 milliGRAM(s) Oral every 6 hours PRN  aluminum hydroxide/magnesium hydroxide/simethicone Suspension 30 milliLiter(s) Oral every 6 hours PRN      REVIEW OF SYSTEMS  --------------------------------------------------------------------------------  Gen: No weight changes, + fatigue and weakness  Skin: No rashes  Head/Eyes/Ears/Mouth: No headache; Normal hearing; Normal vision w/o blurriness; No sinus pain/discomfort, sore throat  Respiratory: + dyspnea on exertion, cough, wheezing, hemoptysis  CV: No chest pain, PND, orthopnea  GI: + epigastric abdominal discomfort for 3 months, 2x diarrhea, +nausea, novomiting, melena, hematochezia  : No increased frequency, dysuria, hematuria, nocturia  MSK: No joint pain/swelling; no back pain; + edema  Neuro: No dizziness/lightheadedness, weakness, seizures, numbness, tingling  Heme: No easy bruising or bleeding  Endo: No heat/cold intolerance  Psych: No significant nervousness, anxiety, stress, depression    All other systems were reviewed and are negative, except as noted.    VITALS/PHYSICAL EXAM  --------------------------------------------------------------------------------  T(C): 37 (05-12-18 @ 09:19), Max: 37 (05-12-18 @ 09:19)  HR: 99 (05-12-18 @ 08:10) (72 - 112)  BP: 95/54 (05-12-18 @ 08:10) (95/54 - 132/70)  RR: 16 (05-12-18 @ 08:10) (16 - 16)  SpO2: 95% (05-12-18 @ 08:10) (92% - 98%)  Wt(kg): --        05-11-18 @ 07:01  -  05-12-18 @ 07:00  --------------------------------------------------------  IN: 976 mL / OUT: 1450 mL / NET: -474 mL    05-12-18 @ 07:01  -  05-12-18 @ 11:20  --------------------------------------------------------  IN: 360 mL / OUT: 100 mL / NET: 260 mL      Physical Exam:  	Gen: NAD, obese  	HEENT: PERRL, supple neck, clear oropharynx  	Pulm: b/l crackles, speaking in full sentences  	CV: RRR, S1S2; no rub  	Abd: +BS, soft, nontender/nondistended  	: No suprapubic tenderness  	UE: Warm,i + edema no asterixis  	LE: Warm, 2-3+ edema  	Neuro: No focal deficits  	Psych: Normal affect and mood  	Skin: Warm, without rashes, pale    LABS/STUDIES  --------------------------------------------------------------------------------              12.2   7.3   >-----------<  202      [05-12-18 @ 02:46]              37.6     134  |  96  |  59  ----------------------------<  113      [05-12-18 @ 02:46]  3.6   |  28  |  1.80        Ca     7.9     [05-12-18 @ 02:46]      Mg     2.1     [05-12-18 @ 02:46]      PT/INR: PT 12.2 , INR 1.10       [05-12-18 @ 02:46]  PTT: 128.4      [05-12-18 @ 11:11]      Creatinine Trend:  SCr 1.80 [05-12 @ 02:46]  SCr 1.86 [05-11 @ 07:07]  SCr 1.88 [05-10 @ 06:24]  SCr 2.02 [05-09 @ 02:32]  SCr 2.14 [05-08 @ 02:28]    Urinalysis - [05-09-18 @ 07:59]      Color Straw / Appearance Hazy / SG 1.020 / pH 6.5      Gluc NEGATIVE / Ketone NEGATIVE  / Bili Negative / Urobili 0.2       Blood Trace / Protein >=300 / Leuk Est NEGATIVE / Nitrite NEGATIVE      RBC < 5 / WBC < 5 / Hyaline 0-2 / Gran  / Sq Epi  / Non Sq Epi 0-5 / Bacteria Present    Urine Creatinine 30      [05-09-18 @ 07:59]  Urine Albumin 185.5      [05-09-18 @ 07:59]  Urine Sodium 64      [05-09-18 @ 07:59]  Urine Urea Nitrogen 258      [05-09-18 @ 07:59]  Urine Osmolality 270      [05-09-18 @ 07:59]    HbA1c 6.1      [04-08-18 @ 04:38]  TSH 1.371      [04-08-18 @ 04:37]  Lipid: chol 134, TG 88, HDL 56, LDL 60      [04-08-18 @ 04:37]

## 2018-05-13 NOTE — PROGRESS NOTE ADULT - PROBLEM SELECTOR PLAN 3
Pt w/ elevated Troponin on admission to 0.06, complaining of occasional intermittent exertional chest tightness when bending over which relieves with rest and sitting up. No current chest pain, only complaining of epigastric pain.  - Trop peaked at 0.78  - EKG noting Afib w/ LBBB (previous EKG w/ incomplete LBBB)  - S/p ASA/Plavix load. C/w ASA 81mg qd, Plavix 75mg qd   - C/w Heparin drip as patient at risk for thrombotic disease as discussed with Dr. Kemp and Michelle, monitor PTT and dose accordingly. Per Dr. Kemp, discussed anticoagulation risks vs. benefits in detail and family does not want to continue anticoagulation upon discharge and is only agreeable to aspirin   - Lipitor 80 mg decreased to 40mg QHS   - c/w Coreg 6.25 mg BID   - Repeat EKG Afib w/ LBBB (previous EKG w/ incomplete LBBB)  - Echo w/ newly depressed EF 35-50%, from 50-55% last month

## 2018-05-13 NOTE — PROGRESS NOTE ADULT - SUBJECTIVE AND OBJECTIVE BOX
OVERNIGHT EVENTS: NAEO     SUBJECTIVE / INTERVAL HPI: Patient seen and examined at bedside. Patient denies any CP, palpitations, SOB, N/V/D. All remaining ROS negative.     VITAL SIGNS:  Vital Signs Last 24 Hrs  T(C): 36.1 (13 May 2018 04:45), Max: 37.2 (12 May 2018 19:01)  T(F): 97 (13 May 2018 04:45), Max: 98.9 (12 May 2018 19:01)  HR: 82 (13 May 2018 08:14) (72 - 116)  BP: 115/52 (13 May 2018 08:14) (93/48 - 145/66)  BP(mean): 70 (13 May 2018 08:14) (67 - 93)  RR: 16 (13 May 2018 08:14) (16 - 16)  SpO2: 99% (13 May 2018 08:14) (94% - 99%)    PHYSICAL EXAM:    General: NAD  HEENT: NC/AT; PERRL, anicteric sclera; MMM  Neck: supple  Cardiovascular: +S1/S2;   Respiratory: bibasilar crackles.   Gastrointestinal: soft, NT/ND; +BSx4  Extremities: WWP; 2+pitting edema B/L LE.   Vascular: 2+ radial, DP/PT pulses B/L  Neurological: AAOx3    MEDICATIONS:  MEDICATIONS  (STANDING):  albumin human 25% IVPB 50 milliLiter(s) IV Intermittent every 8 hours  aspirin  chewable 81 milliGRAM(s) Oral daily  atorvastatin 40 milliGRAM(s) Oral at bedtime  buMETAnide Injectable 2 milliGRAM(s) IV Push every 8 hours  carvedilol 3.125 milliGRAM(s) Oral every 12 hours  clopidogrel Tablet 75 milliGRAM(s) Oral daily  heparin  Infusion 700 Unit(s)/Hr (7 mL/Hr) IV Continuous <Continuous>  metolazone 5 milliGRAM(s) Oral daily    MEDICATIONS  (PRN):  acetaminophen   Tablet. 650 milliGRAM(s) Oral every 6 hours PRN Moderate Pain (4 - 6)  aluminum hydroxide/magnesium hydroxide/simethicone Suspension 30 milliLiter(s) Oral every 6 hours PRN Dyspepsia      ALLERGIES:  Allergies    metoprolol (Unknown)  penicillin (Rash)    Intolerances        LABS:                        13.1   6.3   )-----------( 223      ( 13 May 2018 07:13 )             40.3     05-13    133<L>  |  93<L>  |  52<H>  ----------------------------<  109<H>  4.1   |  27  |  1.79<H>    Ca    8.7      13 May 2018 07:13  Mg     2.3     05-13      PT/INR - ( 13 May 2018 07:13 )   PT: 11.2 sec;   INR: 1.01          PTT - ( 13 May 2018 07:13 )  PTT:64.9 sec    CAPILLARY BLOOD GLUCOSE    RADIOLOGY & ADDITIONAL TESTS: Reviewed.

## 2018-05-13 NOTE — PROGRESS NOTE ADULT - PROBLEM SELECTOR PLAN 4
Patient presenting with Cr 1.90, baseline of 1.4 earlier this year though in prior records as low as 0.8. Likely element of CKD as well.  - FEUr consistent w/ prerenal disease  - Renal US, no hydronephrosis.   - Trend BUN/Crea, 59/1.80 today  - Avoid nephrotoxic agents  - Renally dose all meds  - Outpt valsartan HELD 2/2 ANNETTE on CKD, consider restarting as BUN/Creat downtrending   - F/u Renal Dr Martinez recs Patient presenting with Cr 1.90, baseline of 1.4 earlier this year though in prior records as low as 0.8. Likely element of CKD as well.  - FEUr consistent w/ prerenal disease  - Renal US, no hydronephrosis.   - Avoid nephrotoxic agents  - Renally dose all meds  - Outpt valsartan HELD 2/2 ANNETTE on CKD, consider restarting as BUN/Creat downtrending   - F/u Renal Dr Martinez recs

## 2018-05-13 NOTE — PROGRESS NOTE ADULT - ASSESSMENT
89yo F with PMHx of Afib on Eliquis, HFpEF 50%, and HTN who presents with progressive dyspnea on exertion, lazarus LE edema 5/7,  admitted to tele for acute on chronic CHF exacerbation likely in setting of NSTEMI, now found to have nephrotic syndrome

## 2018-05-13 NOTE — PROGRESS NOTE ADULT - PROBLEM SELECTOR PLAN 7
Patient with albumin of 2.1 on admission with reports of nausea, decreased PO intake for the past few weeks, likely related to fluid overload and poor PO intake.  - Concentrated albumin 25% added to regimen to be given 15 min prior to bumex doses  - Trend CMP  - Nutrition consult

## 2018-05-13 NOTE — PROGRESS NOTE ADULT - ASSESSMENT
89yo F with anasarca 2/2 decompensated HF and newly diagnosed nephrotic syndrome with improving ANNETTE:    # ANNETTE with nephrotic syndrome  - patient and daughter prefer conservative care, no further w/u just symptomatic treatment  - Cr improving, non oliguric    # Anasarca/decomp HF  - diuresing well, continue    # Hyponatremia - mild, asymp, 2/2 volume overload, check BID along with K/Mg while on diuresis

## 2018-05-13 NOTE — PROGRESS NOTE ADULT - PROBLEM SELECTOR PLAN 5
Patient with hyponatremia on admission to 130, likely due to acute on chronic CHF exacerbation, hypervolemic hyponatremia.  - Na 133 today  - Serum Osm and Urine Osm WNL   - F/u Renal Dr Michelle del angel

## 2018-05-13 NOTE — PROGRESS NOTE ADULT - PROBLEM SELECTOR PLAN 2
Pt w/ previous Hx of HFpEF (EF 50-55% on echo 4/2018) presenting with worsening exertional dyspnea, decreased exercise tolerance, orthopnea, increased lazarus LE edema x 3 weeks. CXR w/ pulmonary vascular congestion, elevated cardiac enzymes peaked at 0.78, BNP 9464. Acute systolic CHF exacerbation likely 2/2 ischemia vs Lasix failure vs tachyarrhythmias. Patient given Lasix 60mg IV in ED with minimal urine output   - ECHO w/ Bubble Study 5/8 w/ EF 35-40%, mod conc LVH, LV global hypokinesis, LA mildly dilated, RA dilated, trace MR, mild TR, mild pulm HTN, PASP 42  - Nephrotic syndrome contributing to volume overload , which could be related to minimal change disease, membranous nephropathy, focal sclerosis, or amyloid/myeloma.  - IV Lasix 80mg TID switched to IV Bumex 2mg TID   - Metolazone 5mg QD added to regimen prior to Bumex  - Concentrated albumin 25% added to regimen to be given 15 min prior to bumex doses  - Goal net negative 1.5L-2L   - Monitor Lytes BID and replete PRN  - c/w Coreg 6.25 mg BID   - Outpt valsartan HELD 2/2 ANNETTE on CKD, consider restarting as BUN/Creat downtrending   - Trop peaked at 0.78. Medical management for NSTEMI at this time per Dr Kemp  - EKG noting Afib w/ LBBB (prior EKGs w/ incomplete LBBB last admission)  - Strict I/Os, daily weights, 1L fluid restrictions  - palliative c/s pending

## 2018-05-13 NOTE — PROGRESS NOTE ADULT - PROBLEM SELECTOR PLAN 1
Patient found to have nephrotic syndrome (albumin 2.1, protein in urine and peripheral edema).   -Heparin gtt restarted as patient at risk for thrombotic disease as discussed with Dr. Kemp and Michelle, monitor PTT and dose accordingly. Per Dr. Kemp, discussed anticoagulation risks vs. benefits in detail and family does not want to continue anticoagulation upon discharge and are only agreeable to aspirin   - renal consulted, appreciate recs   - patient is not a candidate for kidney biopsy, empiric therapy with immunosuppressive agents, or therapy for amyloid/myeloma, even if present. Family does not prefer diagnostic interventions.   - IV Lasix 80mg TID switched to IV Bumex 2mg TID   - Metolazone 5mg QD added to regimen prior to Bumex  - Concentrated albumin 25% added to regimen to be given 15 min prior to bumex doses  - Goal net negative 1.5L-2L   - Monitor Lytes BID and replete PRN  - c/w Coreg 6.25 mg BID   - monitor BUN/Creat  - palliative c/s pending

## 2018-05-14 DIAGNOSIS — R68.89 OTHER GENERAL SYMPTOMS AND SIGNS: ICD-10-CM

## 2018-05-14 DIAGNOSIS — Z71.89 OTHER SPECIFIED COUNSELING: ICD-10-CM

## 2018-05-14 DIAGNOSIS — I48.91 UNSPECIFIED ATRIAL FIBRILLATION: ICD-10-CM

## 2018-05-14 DIAGNOSIS — Z51.5 ENCOUNTER FOR PALLIATIVE CARE: ICD-10-CM

## 2018-05-14 DIAGNOSIS — I50.33 ACUTE ON CHRONIC DIASTOLIC (CONGESTIVE) HEART FAILURE: ICD-10-CM

## 2018-05-14 DIAGNOSIS — Z78.9 OTHER SPECIFIED HEALTH STATUS: ICD-10-CM

## 2018-05-14 LAB
ALBUMIN SERPL ELPH-MCNC: 2.5 G/DL — LOW (ref 3.3–5)
ALP SERPL-CCNC: 73 U/L — SIGNIFICANT CHANGE UP (ref 40–120)
ALT FLD-CCNC: 14 U/L — SIGNIFICANT CHANGE UP (ref 10–45)
ANION GAP SERPL CALC-SCNC: 11 MMOL/L — SIGNIFICANT CHANGE UP (ref 5–17)
ANION GAP SERPL CALC-SCNC: 8 MMOL/L — SIGNIFICANT CHANGE UP (ref 5–17)
APTT BLD: 60.8 SEC — HIGH (ref 27.5–37.4)
AST SERPL-CCNC: 24 U/L — SIGNIFICANT CHANGE UP (ref 10–40)
BILIRUB DIRECT SERPL-MCNC: <0.2 MG/DL — SIGNIFICANT CHANGE UP (ref 0–0.2)
BILIRUB INDIRECT FLD-MCNC: >0.1 MG/DL — LOW (ref 0.2–1)
BILIRUB SERPL-MCNC: 0.3 MG/DL — SIGNIFICANT CHANGE UP (ref 0.2–1.2)
BUN SERPL-MCNC: 50 MG/DL — HIGH (ref 7–23)
BUN SERPL-MCNC: 54 MG/DL — HIGH (ref 7–23)
CALCIUM SERPL-MCNC: 8.6 MG/DL — SIGNIFICANT CHANGE UP (ref 8.4–10.5)
CALCIUM SERPL-MCNC: 9 MG/DL — SIGNIFICANT CHANGE UP (ref 8.4–10.5)
CHLORIDE SERPL-SCNC: 89 MMOL/L — LOW (ref 96–108)
CHLORIDE SERPL-SCNC: 91 MMOL/L — LOW (ref 96–108)
CO2 SERPL-SCNC: 31 MMOL/L — SIGNIFICANT CHANGE UP (ref 22–31)
CO2 SERPL-SCNC: 33 MMOL/L — HIGH (ref 22–31)
CREAT SERPL-MCNC: 1.67 MG/DL — HIGH (ref 0.5–1.3)
CREAT SERPL-MCNC: 1.67 MG/DL — HIGH (ref 0.5–1.3)
GLUCOSE SERPL-MCNC: 114 MG/DL — HIGH (ref 70–99)
GLUCOSE SERPL-MCNC: 94 MG/DL — SIGNIFICANT CHANGE UP (ref 70–99)
HCT VFR BLD CALC: 35 % — SIGNIFICANT CHANGE UP (ref 34.5–45)
HGB BLD-MCNC: 11.4 G/DL — LOW (ref 11.5–15.5)
INR BLD: 1.07 — SIGNIFICANT CHANGE UP (ref 0.88–1.16)
MAGNESIUM SERPL-MCNC: 2.1 MG/DL — SIGNIFICANT CHANGE UP (ref 1.6–2.6)
MAGNESIUM SERPL-MCNC: 2.1 MG/DL — SIGNIFICANT CHANGE UP (ref 1.6–2.6)
MCHC RBC-ENTMCNC: 31 PG — SIGNIFICANT CHANGE UP (ref 27–34)
MCHC RBC-ENTMCNC: 32.6 G/DL — SIGNIFICANT CHANGE UP (ref 32–36)
MCV RBC AUTO: 95.1 FL — SIGNIFICANT CHANGE UP (ref 80–100)
PLATELET # BLD AUTO: 186 K/UL — SIGNIFICANT CHANGE UP (ref 150–400)
POTASSIUM SERPL-MCNC: 3.4 MMOL/L — LOW (ref 3.5–5.3)
POTASSIUM SERPL-MCNC: 4 MMOL/L — SIGNIFICANT CHANGE UP (ref 3.5–5.3)
POTASSIUM SERPL-SCNC: 3.4 MMOL/L — LOW (ref 3.5–5.3)
POTASSIUM SERPL-SCNC: 4 MMOL/L — SIGNIFICANT CHANGE UP (ref 3.5–5.3)
PROT SERPL-MCNC: 5.8 G/DL — LOW (ref 6–8.3)
PROTHROM AB SERPL-ACNC: 11.9 SEC — SIGNIFICANT CHANGE UP (ref 9.8–12.7)
RBC # BLD: 3.68 M/UL — LOW (ref 3.8–5.2)
RBC # FLD: 14 % — SIGNIFICANT CHANGE UP (ref 10.3–16.9)
SODIUM SERPL-SCNC: 130 MMOL/L — LOW (ref 135–145)
SODIUM SERPL-SCNC: 133 MMOL/L — LOW (ref 135–145)
WBC # BLD: 9.4 K/UL — SIGNIFICANT CHANGE UP (ref 3.8–10.5)
WBC # FLD AUTO: 9.4 K/UL — SIGNIFICANT CHANGE UP (ref 3.8–10.5)

## 2018-05-14 PROCEDURE — 99232 SBSQ HOSP IP/OBS MODERATE 35: CPT

## 2018-05-14 PROCEDURE — 99223 1ST HOSP IP/OBS HIGH 75: CPT

## 2018-05-14 PROCEDURE — 71045 X-RAY EXAM CHEST 1 VIEW: CPT | Mod: 26

## 2018-05-14 PROCEDURE — 99233 SBSQ HOSP IP/OBS HIGH 50: CPT

## 2018-05-14 RX ORDER — POTASSIUM CHLORIDE 20 MEQ
40 PACKET (EA) ORAL EVERY 4 HOURS
Qty: 0 | Refills: 0 | Status: COMPLETED | OUTPATIENT
Start: 2018-05-14 | End: 2018-05-14

## 2018-05-14 RX ORDER — CARVEDILOL PHOSPHATE 80 MG/1
6.25 CAPSULE, EXTENDED RELEASE ORAL EVERY 12 HOURS
Qty: 0 | Refills: 0 | Status: DISCONTINUED | OUTPATIENT
Start: 2018-05-14 | End: 2018-05-17

## 2018-05-14 RX ORDER — POTASSIUM CHLORIDE 20 MEQ
40 PACKET (EA) ORAL EVERY 4 HOURS
Qty: 0 | Refills: 0 | Status: DISCONTINUED | OUTPATIENT
Start: 2018-05-14 | End: 2018-05-14

## 2018-05-14 RX ADMIN — CLOPIDOGREL BISULFATE 75 MILLIGRAM(S): 75 TABLET, FILM COATED ORAL at 10:08

## 2018-05-14 RX ADMIN — CARVEDILOL PHOSPHATE 3.12 MILLIGRAM(S): 80 CAPSULE, EXTENDED RELEASE ORAL at 06:10

## 2018-05-14 RX ADMIN — CARVEDILOL PHOSPHATE 6.25 MILLIGRAM(S): 80 CAPSULE, EXTENDED RELEASE ORAL at 17:26

## 2018-05-14 RX ADMIN — HEPARIN SODIUM 7 UNIT(S)/HR: 5000 INJECTION INTRAVENOUS; SUBCUTANEOUS at 06:10

## 2018-05-14 RX ADMIN — Medication 50 MILLILITER(S): at 13:00

## 2018-05-14 RX ADMIN — Medication 50 MILLILITER(S): at 06:10

## 2018-05-14 RX ADMIN — ATORVASTATIN CALCIUM 40 MILLIGRAM(S): 80 TABLET, FILM COATED ORAL at 22:46

## 2018-05-14 RX ADMIN — BUMETANIDE 2 MILLIGRAM(S): 0.25 INJECTION INTRAMUSCULAR; INTRAVENOUS at 22:46

## 2018-05-14 RX ADMIN — Medication 50 MILLILITER(S): at 21:13

## 2018-05-14 RX ADMIN — BUMETANIDE 2 MILLIGRAM(S): 0.25 INJECTION INTRAMUSCULAR; INTRAVENOUS at 14:00

## 2018-05-14 RX ADMIN — Medication 81 MILLIGRAM(S): at 10:08

## 2018-05-14 RX ADMIN — BUMETANIDE 2 MILLIGRAM(S): 0.25 INJECTION INTRAMUSCULAR; INTRAVENOUS at 07:32

## 2018-05-14 RX ADMIN — Medication 40 MILLIEQUIVALENT(S): at 10:08

## 2018-05-14 RX ADMIN — Medication 40 MILLIEQUIVALENT(S): at 07:45

## 2018-05-14 NOTE — CONSULT NOTE ADULT - PROBLEM SELECTOR RECOMMENDATION 8
Began conversation with pt dganatoly Moss on phone Plan for further discussion tomorrow at 3:30. Will establish GOC, discuss code status and discuss dispo planning

## 2018-05-14 NOTE — CONSULT NOTE ADULT - PROBLEM SELECTOR RECOMMENDATION 5
ECHO w/ Bubble Study 5/8 w/ EF 35-40%, mod conc LVH, LV global hypokinesis, LA mildly dilated, RA dilated, trace MR, mild TR, mild pulm HTN,  EF 4/20 50%  tx per cardiology team

## 2018-05-14 NOTE — CONSULT NOTE ADULT - PROBLEM SELECTOR RECOMMENDATION 9
pt found with nephrotic syndrome as evidenced by Alb 2.1, peripheral edema and protein spilling into urine   Cardiology team with discussion to benefit vs burdens of anticoagulation therapy . Ok with Heparin, Plavix and ASA while inpt but agree only to ASA once D/C   not a candidate for renal biopsy, family deferring any workup  Bumex, Albumin and Zaroxylyn per card team

## 2018-05-14 NOTE — PROGRESS NOTE ADULT - SUBJECTIVE AND OBJECTIVE BOX
CC: SOB    INTERVAL HISTORY:    * Breathing more comfortably. * HTN borderline controlled. * CKD stable. * Net - on lasix.    ROS: No chest pain. SOB on exertion No nausea.    PAST MEDICAL & SURGICAL HISTORY:  CHF (congestive heart failure)  Atrial fibrillation  HTN (hypertension)  H/O right knee surgery  No significant past surgical history  No significant past surgical history    PHYSICAL EXAM:  T(C): 36.9 (14 May 2018 18:25), Max: 37.3 (14 May 2018 05:11)  HR: 96 (14 May 2018 16:53)  BP: 140/73 (14 May 2018 16:53) (112/62 - 163/67)  RR: 16 (14 May 2018 16:53)  SpO2: 96% (14 May 2018 16:53)      13 May 2018 07:01  -  14 May 2018 07:00  --------------------------------------------------------  IN:    heparin Infusion: 168 mL    IV PiggyBack: 150 mL    Oral Fluid: 510 mL  Total IN: 828 mL    OUT:    Voided: 3775 mL  Total OUT: 3775 mL    Total NET: -2947 mL      14 May 2018 07:01  -  14 May 2018 18:42  --------------------------------------------------------  IN:    heparin Infusion: 77 mL    IV PiggyBack: 50 mL    Oral Fluid: 60 mL  Total IN: 187 mL    OUT:    Voided: 1025 mL  Total OUT: 1025 mL    Total NET: -838 mL        Weight     Appearance: alert, pleasant, INAD.  ENT: oral mucosa moist, no pallor/cyanosis.  Neck: no JVD visible.  Cardiac: no rubs. no murmurs. Extremities: pitting legs  Skin: no rashes.  Extremities (digits): no clubbing or cyanosis.  Respratory effort: no access muscle use. Lungs: decreased breath sounds  Abdomen: soft. nontender. no masses.  Psych affect: not depressed. Orientation: person, place, situation.     MEDICATIONS  (STANDING):  albumin human 25% IVPB 50 milliLiter(s) IV Intermittent every 8 hours  aspirin  chewable 81 milliGRAM(s) Oral daily  atorvastatin 40 milliGRAM(s) Oral at bedtime  buMETAnide Injectable 2 milliGRAM(s) IV Push every 8 hours  carvedilol 6.25 milliGRAM(s) Oral every 12 hours  clopidogrel Tablet 75 milliGRAM(s) Oral daily  heparin  Infusion 700 Unit(s)/Hr (7 mL/Hr) IV Continuous <Continuous>  metolazone 5 milliGRAM(s) Oral daily    MEDICATIONS  (PRN):  acetaminophen   Tablet. 650 milliGRAM(s) Oral every 6 hours PRN Moderate Pain (4 - 6)  aluminum hydroxide/magnesium hydroxide/simethicone Suspension 30 milliLiter(s) Oral every 6 hours PRN Dyspepsia    DATA:  130<L>  |  89<L>  |  50<H>  ----------------------------<  114<H>  Ca:8.6   (14 May 2018 06:28)  3.4<L>   |  33<H>  |  1.67<H>      eGFR if Non : 26 <L>  eGFR if : 30 <L>    TPro  5.8<L>  /  Alb  2.5<L>  /  TBili  0.3    /  DBili  <0.2   /  AST  24     /  ALT  14     /  AlkPhos  73     14 May 2018 06:28    SCr 1.67 [14 May 2018 06:28]  SCr 1.79 [13 May 2018 07:13]  SCr 1.91 [12 May 2018 19:38]  SCr 1.80 [12 May 2018 02:46]  SCr 1.86 [11 May 2018 07:07]                          11.4<L>  9.4   )-----------( 186      ( 14 May 2018 06:28 )             35.0     Phos:-- M.1 mg/dL PTH:-- Uric acid:-- Serum Osm:--  Ferritin:-- Iron:-- TIBC:-- Tsat:--  B12:-- TSH:-- (14 May 2018 06:28)      UProt:-- UCr:26 mg/dL P/C Ratio:-- 24 hour Prot:-- UVol:-- CrCl:--  Gary:72 mmol/L UOsm:-- UVol:-- UCl:-- UK:-- (13 May 2018 15:44)

## 2018-05-14 NOTE — PROGRESS NOTE ADULT - PROBLEM SELECTOR PLAN 3
Pt w/ elevated Troponin on admission to 0.06, complaining of occasional intermittent exertional chest tightness when bending over which relieves with rest and sitting up. No current chest pain, only complaining of epigastric pain.  - Trop peaked at 0.78  - EKG noting Afib w/ LBBB (previous EKG w/ incomplete LBBB)  - S/p ASA/Plavix load. C/w ASA 81mg qd, Plavix 75mg qd   - C/w Heparin drip as patient at risk for thrombotic disease as discussed with Dr. Kemp and Michelle, monitor PTT and dose accordingly. Per Dr. Kemp, discussed anticoagulation risks vs. benefits in detail and family does not want to continue anticoagulation upon discharge and is only agreeable to aspirin   - c/w Lipitor 40mg QHS   - c/w Coreg 6.25 mg BID   - Repeat EKG Afib w/ LBBB (previous EKG w/ incomplete LBBB)  - Echo w/ newly depressed EF 35-50%, from 50-55% last month

## 2018-05-14 NOTE — PROGRESS NOTE ADULT - PROBLEM SELECTOR PLAN 5
Patient with hyponatremia on admission to 130, likely due to acute on chronic CHF exacerbation, hypervolemic hyponatremia.  - Na 130 today  - Serum Osm and Urine Osm WNL   - F/u Renal Dr Michelle del angel

## 2018-05-14 NOTE — PROGRESS NOTE ADULT - PROBLEM SELECTOR PLAN 1
Patient found to have nephrotic syndrome (albumin 2.1, protein in urine and peripheral edema).   -Heparin gtt restarted as patient at risk for thrombotic disease as discussed with Dr. Kemp and Michelle, monitor PTT and dose accordingly. Per Dr. Kemp, discussed anticoagulation risks vs. benefits in detail and family does not want to continue anticoagulation upon discharge and are only agreeable to aspirin   - renal consulted, appreciate recs   - patient is not a candidate for kidney biopsy, empiric therapy with immunosuppressive agents, or therapy for amyloid/myeloma, even if present. Family does not prefer diagnostic interventions.   - c/w IV Bumex 2mg TID   - c/w Metolazone 5mg QD   - c/w Concentrated albumin 25%, to be given 15 min prior to bumex doses  - Goal net negative 1.5L-2L   - Monitor Lytes BID and replete PRN  - c/w Coreg 6.25 mg BID   - monitor BUN/Creat  - palliative c/s pending

## 2018-05-14 NOTE — CONSULT NOTE ADULT - PROBLEM SELECTOR RECOMMENDATION 7
Support provided to patient and family. Patient to have access to supportive services during rest of hospital stay as the patient/family deemed necessary ie. Chaplaincy, Massage therapy, Music therapy, Patient and family supportive services, Palliative SW, etc.    once PSSA comple, recs to follow

## 2018-05-14 NOTE — CONSULT NOTE ADULT - PROBLEM SELECTOR RECOMMENDATION 3
Albumin 2.1 on admission likely 2/2 poor po intake over past few weeks and fluid overload  Albumin given prior to Bumex

## 2018-05-14 NOTE — CONSULT NOTE ADULT - PROBLEM SELECTOR RECOMMENDATION 4
pt with hx of HERNANDO on home Cardizem and Eliquis  now on Atenelol for rate control   Taken off Eliquis and stared on ASA, Heparin, Plavix

## 2018-05-14 NOTE — PROGRESS NOTE ADULT - PROBLEM SELECTOR PLAN 4
Patient presenting with Cr 1.90, baseline of 1.4 earlier this year though in prior records as low as 0.8. Likely element of CKD as well.  - FEUr consistent w/ prerenal disease  - Renal US, no hydronephrosis.   - Avoid nephrotoxic agents  - Renally dose all meds  - Outpt valsartan HELD 2/2 ANNETTE on CKD, consider restarting as BUN/Creat downtrending   - F/u Renal Dr Martinez recs

## 2018-05-14 NOTE — CONSULT NOTE ADULT - ASSESSMENT
89yo F with PMHx of Afib on Eliquis and HTN who presents with progressive dyspnea on exertion, lazarus LE edema admitted for acute on chronic CHF exacerbation likely in setting of NSTEMI, now found to have nephrotic syndrome
CKD stage 3, systolic CHF (EF now 35 - 40), HTN controlled.    Suggest:    1. Increase lasix to 60 mg IV TID.  2. Aim for net negative at least 2L/d.  3. Follow SCr.  4. Continue atenolol. (Consider changing to toprol  qd given reduced renal function.)    Please call with any questions.    Benjie Martinez MD, FACP, FASN | kidney.[facility]ly [facility]  Nephrology, Hypertension, and Internal Medicine  Mobile: [phone] (Daytime Hours Only)  Office/Answering Service: [phone]  Asst. Prof. of Medicine, [facility] - Nephrology at [address]  [address], [address], [place], [place]

## 2018-05-14 NOTE — PROGRESS NOTE ADULT - SUBJECTIVE AND OBJECTIVE BOX
Patient is a 89yo F with a PMHx of A fib on Eliquis, HFpEF 50%, HTN with multiple recent admissions to [facility] for A fib w/RVR and CHF exacerbation who presents with progressive dyspnea on exertion and shortness of breath for ..............    In the ED, VS T 97.5, HR 92, /85, R 18, SpO2 94% on room air. Labs without leukocytosis or anemia, normal coags, hyponatremia to 130, Cr of 1.98 (baseline 1.4), albumin of 2.1, CKMB 7.6, Troponin 0.06, BNP 9464. EKG showing rate controlled atrial fibrillation with a LBBB unchanged from prior studies, CXR with pulmonary vascular congestion. Patient given 60mg IV Lasix and admitted to [facility] for management of acute on chronic CHF exacerbation.     Past Medical History:  Atrial fibrillation    CHF (congestive heart failure)    HTN (hypertension).    Past Surgical History:  H/O right knee surgery.     Pt seen and examined at bedside on     ICU Vital Signs Last 24 Hrs  T(C): 36.9 (14 May 2018 18:25), Max: 37.3 (14 May 2018 05:11)  T(F): 98.5 (14 May 2018 18:25), Max: 99.2 (14 May 2018 05:11)  HR: 90 (14 May 2018 18:55) (78 - 100)  BP: 129/70 (14 May 2018 18:55) (112/62 - 163/67)  BP(mean): 95 (14 May 2018 18:55) (76 - 107)  ABP: --  ABP(mean): --  RR: 16 (14 May 2018 18:55) (16 - 16)  SpO2: 96% (14 May 2018 18:55) (94% - 96%)        PHYSICAL EXAM:    General: NAD  Neck: Supple, NO JVD  Cardiac: S1 S2, No M/R/G  Pulmonary:  Diminished breath sounds throughout with Bibasilar crackles. No wheezing   Abdomen: Soft, Non -tender, +BS x 4 quads  Extremities: +3 pitting edema RLE/+2 pitting edema LLE extending to knees   Neuro: AAOx3          LABS:                          11.4   9.4   )-----------( 186      ( 14 May 2018 06:28 )             35.0                              05-14    130<L>  |  89<L>  |  50<H>  ----------------------------<  114<H>  3.4<L>   |  33<H>  |  1.67<H>    Ca    8.6      14 May 2018 06:28  Mg     2.1     05-14    TPro  5.8<L>  /  Alb  2.5<L>  /  TBili  0.3  /  DBili  <0.2  /  AST  24  /  ALT  14  /  AlkPhos  73  05-14    LIVER FUNCTIONS - ( 14 May 2018 06:28 )  Alb: 2.5 g/dL / Pro: 5.8 g/dL / ALK PHOS: 73 U/L / ALT: 14 U/L / AST: 24 U/L / GGT: x                                 PT/INR - ( 14 May 2018 06:28 )   PT: 11.9 sec;   INR: 1.07          PTT - ( 14 May 2018 06:28 )  PTT:60.8 sec  CAPILLARY BLOOD GLUCOSE      POCT Blood Glucose.: 122 mg/dL (13 May 2018 21:11)            Allergies:  penicillin (Rash)    MEDICATIONS  (STANDING):  albumin human 25% IVPB 50 milliLiter(s) IV Intermittent every 8 hours  aspirin  chewable 81 milliGRAM(s) Oral daily  atorvastatin 40 milliGRAM(s) Oral at bedtime  buMETAnide Injectable 2 milliGRAM(s) IV Push every 8 hours  carvedilol 6.25 milliGRAM(s) Oral every 12 hours  clopidogrel Tablet 75 milliGRAM(s) Oral daily  heparin  Infusion 700 Unit(s)/Hr (7 mL/Hr) IV Continuous <Continuous>  metolazone 5 milliGRAM(s) Oral daily    MEDICATIONS  (PRN):  acetaminophen   Tablet. 650 milliGRAM(s) Oral every 6 hours PRN Moderate Pain (4 - 6)  aluminum hydroxide/magnesium hydroxide/simethicone Suspension 30 milliLiter(s) Oral every 6 hours PRN Dyspepsia

## 2018-05-14 NOTE — CONSULT NOTE ADULT - SUBJECTIVE AND OBJECTIVE BOX
MCKINLEY MCCARTY    [id number]    Patient is a 89y old  Female who presents with a chief complaint of     HPI:  Patient is a 89yo F with a PMHx of A fib on Eliquis, HFpEF 50%, HTN with multiple recent admissions to [facility] for A fib w/RVR and CHF exacerbation who presents with progressive dyspnea on exertion and shortness of breath. Patient states that since she was hospitalized in April her lower extremity edema has been worse and her exercise tolerance has decreased to the point she will only ambulate to the bathroom with the aide of a cane, otherwise opting to stay in bed. Patient also reports decreasing urinary output over the last month despite increase in her home dose of Lasix to 40mg BID. Patient endorses that she sleeps with her back propped up and is unsure if she can lay flat without orthopnea. She states she has occasional chest pressure particularly when ambulating associated with shortness of breath, relieved by rest. Patient currently denies chest pain or palpitations, but endorses constant epigastric pain and decreased PO intake over the past few weeks. Denies fevers, chills, cough, dysuria, vomiting, diarrhea. Currently breathing comfortably on nasal cannula with lower extremity edema.    In the ED, VS T 97.5, HR 92, /85, R 18, SpO2 94% on room air. Labs without leukocytosis or anemia, normal coags, hyponatremia to 130, Cr of 1.98 (baseline 1.4), albumin of 2.1, CKMB 7.6, Troponin 0.06, BNP 9464. EKG showing rate controlled atrial fibrillation with a LBBB unchanged from prior studies, CXR with pulmonary vascular congestion. Patient given 60mg IV Lasix and admitted to [facility] for management of acute on chronic CHF exacerbation. (07 May 2018 19:41)      PAST MEDICAL & SURGICAL HISTORY:  CHF (congestive heart failure)  Atrial fibrillation  HTN (hypertension)  H/O right knee surgery        Social History:    FAMILY HISTORY:  No pertinent family history in first degree relatives      Functional Level Prior to Admission:                          12.5   7.9   )-----------( 228      ( 08 May 2018 02:28 )             38.4       05-08    132<L>  |  95<L>  |  45<H>  ----------------------------<  109<H>  4.3   |  27  |  2.14<H>    Ca    8.0<L>      08 May 2018 02:28  Phos  5.2     05-08  Mg     2.2     05-08    TPro  6.0  /  Alb  2.1<L>  /  TBili  0.2  /  DBili  x   /  AST  46<H>  /  ALT  16  /  AlkPhos  97  05-08      Vital Signs Last 24 Hrs  T(C): 36.3 (08 May 2018 10:00), Max: 36.9 (08 May 2018 05:29)  T(F): 97.4 (08 May 2018 10:00), Max: 98.4 (08 May 2018 05:29)  HR: 82 (08 May 2018 08:24) (72 - 98)  BP: 119/85 (08 May 2018 08:24) (119/79 - 172/81)  BP(mean): 103 (08 May 2018 08:24) (93 - 105)  RR: 16 (08 May 2018 08:24) (15 - 27)  SpO2: 94% (08 May 2018 08:24) (94% - 98%)      PHYSICAL EXAM:  This 89 y-o hb4obvr was admitted on 05/07/18  with ADARSH and chest pressure and lower ext.  edema.  h/o AF / CHF Lives alone and was independent,  ambulation with cane.      Constitutional: OOB in chair, alert, oriented and stable., Obese    Eyes:  neg.      ENMT:  neg.    Neck: supple, no neck pain    Breasts:    Back:  no back pain    Respiratory: no SOB    Cardiovascular:  no chest pain or palpitation    Gastrointestinal :no abdominal pain, obese    Genitourinary:  no dysuris    Rectal:   full ROM 4 ext., no joint pain, edema both legs.  Extremities:    Vascular:    Neurological:  no neurological deficits, deconditioned 3+-4/5, no focal deficits.  eeds minimal assistance to stand for low chair.    Skin:    Lymph Nodes:    Musculoskeletal:    Psychiatric:            Impression:  1.  Deconditioned.  2. CHF  3.  AF    Recommendations: PT for therpautic ex.  and gait traing with device when Pt. is appropriate as presenting  up trending Troponins , 0.78 05/08/18,  0.41 05/07/18
MCKINLEY MCCARTY   [id number]    : 1924    HPI:  Patient is a 89yo F with a PMHx of A fib on Eliquis, HFpEF 50%, HTN with multiple recent admissions to [facility] for A fib w/RVR and CHF exacerbation who presents with progressive dyspnea on exertion and shortness of breath. Patient states that since she was hospitalized in April her lower extremity edema has been worse and her exercise tolerance has decreased to the point she will only ambulate to the bathroom with the aide of a cane, otherwise opting to stay in bed. Patient also reports decreasing urinary output over the last month despite increase in her home dose of Lasix to 40mg BID. Patient endorses that she sleeps with her back propped up and is unsure if she can lay flat without orthopnea. She states she has occasional chest pressure particularly when ambulating associated with shortness of breath, relieved by rest. Patient currently denies chest pain or palpitations, but endorses constant epigastric pain and decreased PO intake over the past few weeks. Denies fevers, chills, cough, dysuria, vomiting, diarrhea. Currently breathing comfortably on nasal cannula with lower extremity edema.  Patient found to have nephrotic syndrome (albumin 2.1, protein in urine and peripheral edema).     In the ED, VS T 97.5, HR 92, /85, R 18, SpO2 94% on room air. Labs without leukocytosis or anemia, normal coags, hyponatremia to 130, Cr of 1.98 (baseline 1.4), albumin of 2.1, CKMB 7.6, Troponin 0.06, BNP 9464. EKG showing rate controlled atrial fibrillation with a LBBB unchanged from prior studies, CXR with pulmonary vascular congestion. Patient given 60mg IV Lasix and admitted to [facility] for management of acute on chronic CHF exacerbation. (07 May 2018 19:41)      PAST MEDICAL & SURGICAL HISTORY:  CHF (congestive heart failure)  Atrial fibrillation  HTN (hypertension)  H/O right knee surgery  H/O hip surgery     FAMILY HISTORY:  No pertinent family history in first degree relatives    ROS:                    Dyspnea (Geo 0-10):    Yes  Geo 3/10                    N/V (Y/N):  N                            Secretions (Y/N) : N                Agitation(Y/N): N  Pain (Y/N):  pt denies       General:  Denied  HEENT:    Denied  Neck:  Denied  CVS: anasarca, SOB  Resp: Denied  GI:  dyspepsia  :  Denied  Musc:  weakness  Neuro:  Denied  Psych:  Denied  Skin:  Denied  Lymph:  Denied    Allergies    metoprolol (Unknown)  penicillin (Rash)    Intolerances    Opiate Naive (Y/N): Yes  -iStop reviewed (Y/N): Yes | Reference #: [id number]     Medications:      MEDICATIONS  (STANDING):  albumin human 25% IVPB 50 milliLiter(s) IV Intermittent every 8 hours  aspirin  chewable 81 milliGRAM(s) Oral daily  atorvastatin 40 milliGRAM(s) Oral at bedtime  buMETAnide Injectable 2 milliGRAM(s) IV Push every 8 hours  carvedilol 6.25 milliGRAM(s) Oral every 12 hours  clopidogrel Tablet 75 milliGRAM(s) Oral daily  heparin  Infusion 700 Unit(s)/Hr (7 mL/Hr) IV Continuous <Continuous>  metolazone 5 milliGRAM(s) Oral daily    MEDICATIONS  (PRN):  acetaminophen   Tablet. 650 milliGRAM(s) Oral every 6 hours PRN Moderate Pain (4 - 6)  aluminum hydroxide/magnesium hydroxide/simethicone Suspension 30 milliLiter(s) Oral every 6 hours PRN Dyspepsia      Labs:    CBC:                        11.4   9.4   )-----------( 186      ( 14 May 2018 06:28 )             35.0     CMP:        130<L>  |  89<L>  |  50<H>  ----------------------------<  114<H>  3.4<L>   |  33<H>  |  1.67<H>    Ca    8.6      14 May 2018 06:28  Mg     2.1         TPro  5.8<L>  /  Alb  2.5<L>  /  TBili  0.3  /  DBili  <0.2  /  AST  24  /  ALT  14  /  AlkPhos  73      PT/INR - ( 14 May 2018 06:28 )   PT: 11.9 sec;   INR: 1.07          PTT - ( 14 May 2018 06:28 )  PTT:60.8 sec      Imaging:   EXAM:  XR CHEST PORTABLE ROUTINE 1V                          PROCEDURE DATE:  2018         INTERPRETATION:    PORTABLE CHEST X-RAY    Indication: CHF    Bedside examination of the chest dated 2018 7:28 AM is compared to   the previous study of 2018.    Findings: No change mild pulmonary vascular congestion, bilateral pleural   effusions, and bibasilar atelectasis.    Impression: No change.  EXAM:  ECHO W BUBBLE AND DOPPLER COMP                          PROCEDURE DATE:  2018       INTERPRETATION:  Patient Height: 160.0 cm  Patient Weight: 92.0 kg  Heart Rate: 94 bpm  Systolic Pressure: 130 mmHg  Diastolic Pressure: 86 mmHg  BSA: 1.9 m^2  Interpretation Summary  There is moderate concentric left ventricular hypertrophy. There is moderate  global hypokinesis of the left ventricle. The left ventricular ejection  fraction is estimated to be 35-40%The left atrium is mildly dilated .The   right  atrium is dilated. There is mild to moderate aortic valve thickening .No aortic  regurgitation noted. No hemodynamically significant valvular aortic  stenosis.  There is moderate mitral valve thickening. There is trace mitral  regurgitation. Structurally normal tricuspid valve.  There is mild tricuspid  regurgitation. There is mild pulmonary hypertension. .There  is no pericardial effusion.    PEx:  T(C): 36.8 (18 @ 14:03), Max: 37.3 (18 @ 05:11)  HR: 96 (18 @ 16:53) (78 - 100)  BP: 140/73 (18 @ 16:53) (112/62 - 163/67)  RR: 16 (18 @ 16:53) (16 - 16)  SpO2: 96% (18 @ 16:53) (94% - 98%)  Wt(kg): -- 92.1 on admission    Daily Weight in k.2 (14 May 2018 05:11)    POCT Blood Glucose.: 122 mg/dL (13 May 2018 21:11)    I&O's Summary    13 May 2018 07:  -  14 May 2018 07:00  --------------------------------------------------------  IN: 828 mL / OUT: 3775 mL / NET: -2947 mL    14 May 2018 07:01  -  14 May 2018 17:50  --------------------------------------------------------  IN: 187 mL / OUT: 1025 mL / NET: -838 mL    General: elderly female appearing younger than stated age, c/o gas   HEENT: N/C A/T PERRL, sclera anicteric   Neck: supple NO JVD   CVS: S1S2 RRR tachycardic, no MRG +3 B/L anasarca t thighs  Resp: bibasilar crackles, poor respiratory effort  GI:  softly distended, + excessive flatus, last BM   :  voiding large amounts of urine   Musc: weakness    Neuro: no focal deficits  Psych: calm and cooperative    Skin: WDI  Lymph: No LAD  Preadmit Karnofsky: 60 %           Current Karnofsky: 40%  Cachexia (Y/N): N  BMI: 36    Advanced Directives:     Full Code     Decision maker: pt along with Riana Mccarty: [phone] and Brooke Mccarty [phone] (dgts)  Legal surrogates: Riana Mccarty: [phone] and Brooke Mccarty [phone] (dgts)      Social History: born in [place], came to [place] at age 26, lives  alone with cleaning help 1x weekly,  x 30 yrs, 2 daughters (one in [place], the other in [place]) worked as homemaker    GOALS OF CARE DISCUSSION       Palliative care info/counseling provided	           Family meeting scheduled for  3:30 PM       Advanced Directivesto be discussed at           Documentation of GOC: conservative medical management of symptoms, will futher        discuss dispo     	          REFERRALS	        Palliative Med        Unit SW/Case Mgmt       Massage Therapy       Music Therapy       Hospice       Nutrition        PT/OT
HPI:  Patient is a 89yo F with a PMHx of A fib on Eliquis, HFpEF 50%, HTN with multiple recent admissions to [facility] for A fib w/RVR and CHF exacerbation who presents with progressive dyspnea on exertion and shortness of breath. Patient states that since she was hospitalized in April her lower extremity edema has been worse and her exercise tolerance has decreased to the point she will only ambulate to the bathroom with the aide of a cane, otherwise opting to stay in bed. Patient also reports decreasing urinary output over the last month despite increase in her home dose of Lasix to 40mg BID. Patient endorses that she sleeps with her back propped up and is unsure if she can lay flat without orthopnea. She states she has occasional chest pressure particularly when ambulating associated with shortness of breath, relieved by rest. Patient currently denies chest pain or palpitations, but endorses constant epigastric pain and decreased PO intake over the past few weeks. Denies fevers, chills, cough, dysuria, vomiting, diarrhea. Currently breathing comfortably on nasal cannula with lower extremity edema.    In the ED, VS T 97.5, HR 92, /85, R 18, SpO2 94% on room air. Labs without leukocytosis or anemia, normal coags, hyponatremia to 130, Cr of 1.98 (baseline 1.4), albumin of 2.1, CKMB 7.6, Troponin 0.06, BNP 9464. EKG showing rate controlled atrial fibrillation with a LBBB unchanged from prior studies, CXR with pulmonary vascular congestion. Patient given 60mg IV Lasix and admitted to [facility] for management of acute on chronic CHF exacerbation. (07 May 2018 19:41)    Creatinine increase to 2 on admission. -1L/d diuresis to lasix 60 bid.       PAST MEDICAL & SURGICAL HISTORY:  CHF (congestive heart failure)  Atrial fibrillation  HTN (hypertension)  H/O right knee surgery  No significant past surgical history  No significant past surgical history      MEDICATIONS:  aspirin  chewable 81 milliGRAM(s) Oral daily  ATENolol  Tablet 50 milliGRAM(s) Oral daily  atorvastatin 80 milliGRAM(s) Oral at bedtime  clopidogrel Tablet 75 milliGRAM(s) Oral daily  furosemide   Injectable 60 milliGRAM(s) IV Push every 12 hours  heparin  Infusion 800 Unit(s)/Hr IV Continuous <Continuous>      No pertinent family history in first degree relatives      SOCIAL HISTORY: no smoking     REVIEW OF SYSTEMS:  Constitutional: No fevers.   Card: No chest pain.   Resp: SOB with exertion  GI: No nausea or vomiting. No abdominal pain.  : No dysuria. Neuro: No focal weakness or sensory loss.  Eyes: No visual symptoms. MS: No joint swelling.  Skin: No rashes. Psych: No psychosis.    PHYSICAL EXAM:    07 May 2018 07:  -  08 May 2018 07:00  --------------------------------------------------------  IN: 360 mL / OUT: 1175 mL / NET: -815 mL    08 May 2018 07:  -  08 May 2018 22:15  --------------------------------------------------------  IN: 416 mL / OUT: 1300 mL / NET: -884 mL      Constitutional: T(C): 36.6 (08 May 2018 18:01), Max: 36.9 (08 May 2018 05:29)  HR: 96 (08 May 2018 20:03) (72 - 96)  BP: 120/96 (08 May 2018 20:03) (114/61 - 136/71)  RR: 20 (08 May 2018 20:03) (15 - 20)  SpO2: 96% (08 May 2018 20:03) (94% - 98%)  Appearance: Alert, pleasant, INAD.  Eyes: Conjunctivae pink, moist. Pupils equal and reactive.  ENT: External ears/nose normal appearing. Lips normal, dentition good.  Lymphatic: No cervical lymphadenopathy. No supraclavicular lymphadenopathy.  Cardiac: No rubs. NO MURMURS. Extremities: PITTING BILATERAL LEG EDEMA  Respiratory: Effort no accessory muscle use. Lungs: decreaesd breath sounds bases  Abdomen: Soft. No masses. Nontender. Liver: Not palpable. Spleen: Not palpable.  Musculoskeltal digits: No clubbing or cyanosis. Tone normal. Moves all four extremities.  Skin inspection: No rashes. Palpation: No abnormalities.  Neuro: Cranial nerves: no facial assymetry or deficits noted. Sensation: LE intact to light touch.  Psych: Oriented to person, place, situation. Mood/affect: Not depressed.     DATA:  132<L>  |  95<L>  |  45<H>  ----------------------------<  109<H>  Ca:8.0<L> (08 May 2018 02:28)  4.3     |  27     |  2.14<H>      eGFR if Non : 19 <L>  eGFR if : 22 <L>    TPro  6.0    /  Alb  2.1<L>  /  TBili  0.2    /  DBili  x      /  AST  46<H>  /  ALT  16     /  AlkPhos  97     08 May 2018 02:28    SCr 2.14 [08 May 2018 02:28]  SCr 2.01 [07 May 2018 20:21]  SCr 1.90 [07 May 2018 16:40]  SCr 1.46 [2018 06:36]  SCr 1.63 [10 Apr 2018 06:17]                          12.5   7.9   )-----------( 228      ( 08 May 2018 02:28 )             38.4     Phos:5.2 mg/dL<H> M.2 mg/dL PTH:-- Uric acid:-- Serum Osm:292 mosm/kg  Ferritin:-- Iron:-- TIBC:-- Tsat:--  B12:-- TSH:-- (08 May 2018 02:28)      UProt:-- UCr:41 mg/dL P/C Ratio:-- 24 hour Prot:-- UVol:-- CrCl:--  Gary:65 mmol/L UOsm:369 mosmol/kg UVol:-- UCl:-- UK:-- (10 Apr 2018 12:34)

## 2018-05-14 NOTE — CONSULT NOTE ADULT - PROBLEM SELECTOR RECOMMENDATION 6
2/2 fluid overload  using cane prior to admission, now with use of walker  followed by PT, activity as sharon

## 2018-05-14 NOTE — CONSULT NOTE ADULT - PROBLEM SELECTOR RECOMMENDATION 2
elevated troponins on admission with chest tightness  No further chest discomfort  continues with c/o epigastric discomfort  Lipitor and Coreg in addition to AC per card team

## 2018-05-14 NOTE — PROGRESS NOTE ADULT - PROBLEM SELECTOR PLAN 7
Patient with albumin of 2.1 on admission with reports of nausea, decreased PO intake for the past few weeks, likely related to fluid overload and poor PO intake. Albumin 2.5 today.  - Concentrated albumin 25% added to regimen to be given 15 min prior to bumex doses  - Trend CMP  - Nutrition consult

## 2018-05-14 NOTE — PROGRESS NOTE ADULT - ASSESSMENT
89yo F with PMHx of Afib on Eliquis, HFpEF 50%, and HTN who presents with progressive dyspnea on exertion, lazarus LE edema 5/7,  admitted to tele for acute on chronic CHF exacerbation likely in setting of NSTEMI, now found to have nephrotic syndrome

## 2018-05-14 NOTE — PROGRESS NOTE ADULT - ASSESSMENT
89yo F with PMHx of Afib on Eliquis, HFpEF 50%, and HTN who presents with progressive dyspnea on exertion, lazarus LE edema 5/7,  admitted to [facility] for acute on chronic CHF exacerbation likely in setting of NSTEMI, now found to have nephrotic syndrome     Problem/Plan - 1:  ·  Problem: Nephrotic syndrome.  Plan: Patient found to have nephrotic syndrome (albumin 2.1, protein in urine and peripheral edema).   -Heparin gtt restarted as patient at risk for thrombotic disease as discussed with Dr. Kemp and Michelle, monitor PTT and dose accordingly. Per Dr. Kemp, discussed anticoagulation risks vs. benefits in detail and family does not want to continue anticoagulation upon discharge and are only agreeable to aspirin   - renal consulted, appreciate recs   - patient is not a candidate for kidney biopsy, empiric therapy with immunosuppressive agents, or therapy for amyloid/myeloma, even if present. Family does not prefer diagnostic interventions.   - c/w IV Bumex 2mg TID   - c/w Metolazone 5mg QD   - c/w Concentrated albumin 25%, to be given 15 min prior to bumex doses  - Goal net negative 1.5L-2L   - Monitor Lytes BID and replete PRN  - c/w Coreg 6.25 mg BID   - monitor BUN/Creat  - palliative c/s pending.     Problem/Plan - 2:  ·  Problem: CHF (congestive heart failure).  Plan: Pt w/ previous Hx of HFpEF (EF 50-55% on echo 4/2018) presenting with worsening exertional dyspnea, decreased exercise tolerance, orthopnea, increased lazarus LE edema x 3 weeks. CXR w/ pulmonary vascular congestion, elevated cardiac enzymes peaked at 0.78, BNP 9464. Acute systolic CHF exacerbation likely 2/2 ischemia vs Lasix failure vs tachyarrhythmias. Patient given Lasix 60mg IV in ED with minimal urine output   - ECHO w/ Bubble Study 5/8 w/ EF 35-40%, mod conc LVH, LV global hypokinesis, LA mildly dilated, RA dilated, trace MR, mild TR, mild pulm HTN, PASP 42  - Nephrotic syndrome contributing to volume overload , which could be related to minimal change disease, membranous nephropathy, focal sclerosis, or amyloid/myeloma.  - c/w IV Bumex 2mg TID   - c/w Metolazone 5mg QD   - c/w Concentrated albumin 25%, to be given 15 min prior to bumex doses  - Goal net negative 1.5L-2L   - Monitor Lytes BID and replete PRN  - c/w Coreg 6.25 mg BID   - Outpt valsartan HELD 2/2 ANNETTE on CKD, consider restarting as BUN/Creat downtrending   - Trop peaked at 0.78. Medical management for NSTEMI at this time per Dr Kemp  - EKG noting Afib w/ LBBB (prior EKGs w/ incomplete LBBB last admission)  - Strict I/Os, daily weights, 1L fluid restrictions  - palliative c/s pending.     Problem/Plan - 3:  ·  Problem: NSTEMI (non-ST elevated myocardial infarction).  Plan: Pt w/ elevated Troponin on admission to 0.06, complaining of occasional intermittent exertional chest tightness when bending over which relieves with rest and sitting up. No current chest pain, only complaining of epigastric pain.  - Trop peaked at 0.78  - EKG noting Afib w/ LBBB (previous EKG w/ incomplete LBBB)  - S/p ASA/Plavix load. C/w ASA 81mg qd, Plavix 75mg qd   - C/w Heparin drip as patient at risk for thrombotic disease as discussed with Dr. Kemp and Michelle, monitor PTT and dose accordingly. Per Dr. Kemp, discussed anticoagulation risks vs. benefits in detail and family does not want to continue anticoagulation upon discharge and is only agreeable to aspirin   - c/w Lipitor 40mg QHS   - c/w Coreg 6.25 mg BID   - Repeat EKG Afib w/ LBBB (previous EKG w/ incomplete LBBB)  - Echo w/ newly depressed EF 35-50%, from 50-55% last month.     Problem/Plan - 4:  ·  Problem: ANNETTE (acute kidney injury).  Plan: Patient presenting with Cr 1.90, baseline of 1.4 earlier this year though in prior records as low as 0.8. Likely element of CKD as well.  - FEUr consistent w/ prerenal disease  - Renal US, no hydronephrosis.   - Avoid nephrotoxic agents  - Renally dose all meds  - Outpt valsartan HELD 2/2 ANNETTE on CKD, consider restarting as BUN/Creat downtrending   - F/u Renal Dr Martinez recs.     Problem/Plan - 5:  ·  Problem: Hyponatremia.  Plan: Patient with hyponatremia on admission to 130, likely due to acute on chronic CHF exacerbation, hypervolemic hyponatremia.  - Na 130 today  - Serum Osm and Urine Osm WNL   - F/u Renal Dr Martinez recs.     Problem/Plan - 6:  Problem: Atrial fibrillation. Plan: Patient with history of Afib, previously on Cardizem and Eliquis, now on Atenolol for rate control. Patient's Eliquis was discontinued as outpt and placed on ASA. ADR listed to Metoprolol however family states she "got loopy".  - c/w Coreg 6.25 mg BID   - Pt taken off Eliquis per Dr Kemp as outpt per family request  - c/w ASA 81mg qd  - C/w Heparin drip as patient at risk for thrombotic disease as discussed with Dr. Kemp and Michelle, monitor PTT and dose accordingly. Per Dr. Kemp, discussed anticoagulation risks vs. benefits in detail and family does not want to continue anticoagulation upon discharge and is only agreeable to aspirin     #Hypertension  Patient with history of HTN on Atenolol, Norvasc, valsartan at home  - Currently normotensive  - c/w Coreg 6.25 mg BID   - HOLD Valsartan in setting of ANNETTE on CKD, consider restarting as BUN/Creat downtrending.    Problem/Plan - 7:  ·  Problem: Hypoalbuminemia.  Plan: Patient with albumin of 2.1 on admission with reports of nausea, decreased PO intake for the past few weeks, likely related to fluid overload and poor PO intake. Albumin 2.5 today.  - Concentrated albumin 25% added to regimen to be given 15 min prior to bumex doses  - Trend CMP  - Nutrition consult.     Problem/Plan - 8:  ·  Problem: Nutrition, metabolism, and development symptoms.  Plan: DASH/TLC diet, 1L fluid restriction  Nutrition consult as above given hypoalbuminemia and decreased appetite  No IVF at this time  Replete electrolytes PRN to K > 4, Mg > 2.     Problem/Plan - 9:  ·  Problem: Need for other prophylactic measure.  Plan: DVT PPX: IV Heparin gtt   C: FULL CODE, Pt's 2 daughters are HCP.     Problem/Plan - 10:  Problem: Discharge planning issues. Plan; PT eval, home with home PT.

## 2018-05-14 NOTE — PROGRESS NOTE ADULT - PROBLEM SELECTOR PLAN 2
Pt w/ previous Hx of HFpEF (EF 50-55% on echo 4/2018) presenting with worsening exertional dyspnea, decreased exercise tolerance, orthopnea, increased lazarus LE edema x 3 weeks. CXR w/ pulmonary vascular congestion, elevated cardiac enzymes peaked at 0.78, BNP 9464. Acute systolic CHF exacerbation likely 2/2 ischemia vs Lasix failure vs tachyarrhythmias. Patient given Lasix 60mg IV in ED with minimal urine output   - ECHO w/ Bubble Study 5/8 w/ EF 35-40%, mod conc LVH, LV global hypokinesis, LA mildly dilated, RA dilated, trace MR, mild TR, mild pulm HTN, PASP 42  - Nephrotic syndrome contributing to volume overload , which could be related to minimal change disease, membranous nephropathy, focal sclerosis, or amyloid/myeloma.  - c/w IV Bumex 2mg TID   - c/w Metolazone 5mg QD   - c/w Concentrated albumin 25%, to be given 15 min prior to bumex doses  - Goal net negative 1.5L-2L   - Monitor Lytes BID and replete PRN  - c/w Coreg 6.25 mg BID   - Outpt valsartan HELD 2/2 ANNETTE on CKD, consider restarting as BUN/Creat downtrending   - Trop peaked at 0.78. Medical management for NSTEMI at this time per Dr Kemp  - EKG noting Afib w/ LBBB (prior EKGs w/ incomplete LBBB last admission)  - Strict I/Os, daily weights, 1L fluid restrictions  - palliative c/s pending

## 2018-05-14 NOTE — PROGRESS NOTE ADULT - ASSESSMENT
CKD stage 3. Systolic CHF. Nephrotic syndrome.    Suggest:    1. Continue lasix, aim net negative 1 - 2 L/d.  2. Follow SCr.  3. Continue cureg.    Please call with any questions.    Benjie Martinez MD, FACP, FASN | kidney.[facility]  Nephrology, Hypertension, and Internal Medicine  Mobile: [phone] (Daytime Hours Only)  Office/Answering Service: [phone]  Asst. Prof. of Medicine, [facility]  [facility] - Nephrology at [address]  [address], [address], [place], [place]

## 2018-05-15 DIAGNOSIS — Z71.89 OTHER SPECIFIED COUNSELING: ICD-10-CM

## 2018-05-15 LAB
ANION GAP SERPL CALC-SCNC: 11 MMOL/L — SIGNIFICANT CHANGE UP (ref 5–17)
ANION GAP SERPL CALC-SCNC: 12 MMOL/L — SIGNIFICANT CHANGE UP (ref 5–17)
ANION GAP SERPL CALC-SCNC: 9 MMOL/L — SIGNIFICANT CHANGE UP (ref 5–17)
APTT BLD: 39.9 SEC — HIGH (ref 27.5–37.4)
APTT BLD: 53.3 SEC — HIGH (ref 27.5–37.4)
APTT BLD: 83.4 SEC — HIGH (ref 27.5–37.4)
BUN SERPL-MCNC: 49 MG/DL — HIGH (ref 7–23)
BUN SERPL-MCNC: 52 MG/DL — HIGH (ref 7–23)
BUN SERPL-MCNC: 53 MG/DL — HIGH (ref 7–23)
CALCIUM SERPL-MCNC: 8.7 MG/DL — SIGNIFICANT CHANGE UP (ref 8.4–10.5)
CALCIUM SERPL-MCNC: 9 MG/DL — SIGNIFICANT CHANGE UP (ref 8.4–10.5)
CALCIUM SERPL-MCNC: 9.3 MG/DL — SIGNIFICANT CHANGE UP (ref 8.4–10.5)
CHLORIDE SERPL-SCNC: 85 MMOL/L — LOW (ref 96–108)
CHLORIDE SERPL-SCNC: 88 MMOL/L — LOW (ref 96–108)
CHLORIDE SERPL-SCNC: 92 MMOL/L — LOW (ref 96–108)
CO2 SERPL-SCNC: 32 MMOL/L — HIGH (ref 22–31)
CO2 SERPL-SCNC: 33 MMOL/L — HIGH (ref 22–31)
CO2 SERPL-SCNC: 34 MMOL/L — HIGH (ref 22–31)
CREAT SERPL-MCNC: 1.58 MG/DL — HIGH (ref 0.5–1.3)
CREAT SERPL-MCNC: 1.59 MG/DL — HIGH (ref 0.5–1.3)
CREAT SERPL-MCNC: 1.61 MG/DL — HIGH (ref 0.5–1.3)
GLUCOSE BLDC GLUCOMTR-MCNC: 102 MG/DL — HIGH (ref 70–99)
GLUCOSE BLDC GLUCOMTR-MCNC: 106 MG/DL — HIGH (ref 70–99)
GLUCOSE BLDC GLUCOMTR-MCNC: 132 MG/DL — HIGH (ref 70–99)
GLUCOSE SERPL-MCNC: 105 MG/DL — HIGH (ref 70–99)
GLUCOSE SERPL-MCNC: 109 MG/DL — HIGH (ref 70–99)
GLUCOSE SERPL-MCNC: 163 MG/DL — HIGH (ref 70–99)
HCT VFR BLD CALC: 34.7 % — SIGNIFICANT CHANGE UP (ref 34.5–45)
HGB BLD-MCNC: 11.3 G/DL — LOW (ref 11.5–15.5)
INR BLD: 1.04 — SIGNIFICANT CHANGE UP (ref 0.88–1.16)
MAGNESIUM SERPL-MCNC: 2 MG/DL — SIGNIFICANT CHANGE UP (ref 1.6–2.6)
MAGNESIUM SERPL-MCNC: 2 MG/DL — SIGNIFICANT CHANGE UP (ref 1.6–2.6)
MCHC RBC-ENTMCNC: 31.3 PG — SIGNIFICANT CHANGE UP (ref 27–34)
MCHC RBC-ENTMCNC: 32.6 G/DL — SIGNIFICANT CHANGE UP (ref 32–36)
MCV RBC AUTO: 96.1 FL — SIGNIFICANT CHANGE UP (ref 80–100)
PLATELET # BLD AUTO: 172 K/UL — SIGNIFICANT CHANGE UP (ref 150–400)
POTASSIUM SERPL-MCNC: 3.7 MMOL/L — SIGNIFICANT CHANGE UP (ref 3.5–5.3)
POTASSIUM SERPL-MCNC: 4 MMOL/L — SIGNIFICANT CHANGE UP (ref 3.5–5.3)
POTASSIUM SERPL-MCNC: 5.5 MMOL/L — HIGH (ref 3.5–5.3)
POTASSIUM SERPL-SCNC: 3.7 MMOL/L — SIGNIFICANT CHANGE UP (ref 3.5–5.3)
POTASSIUM SERPL-SCNC: 4 MMOL/L — SIGNIFICANT CHANGE UP (ref 3.5–5.3)
POTASSIUM SERPL-SCNC: 5.5 MMOL/L — HIGH (ref 3.5–5.3)
PROTHROM AB SERPL-ACNC: 11.6 SEC — SIGNIFICANT CHANGE UP (ref 9.8–12.7)
RBC # BLD: 3.61 M/UL — LOW (ref 3.8–5.2)
RBC # FLD: 14.2 % — SIGNIFICANT CHANGE UP (ref 10.3–16.9)
SODIUM SERPL-SCNC: 130 MMOL/L — LOW (ref 135–145)
SODIUM SERPL-SCNC: 132 MMOL/L — LOW (ref 135–145)
SODIUM SERPL-SCNC: 134 MMOL/L — LOW (ref 135–145)
WBC # BLD: 6.7 K/UL — SIGNIFICANT CHANGE UP (ref 3.8–10.5)
WBC # FLD AUTO: 6.7 K/UL — SIGNIFICANT CHANGE UP (ref 3.8–10.5)

## 2018-05-15 PROCEDURE — 71045 X-RAY EXAM CHEST 1 VIEW: CPT | Mod: 26

## 2018-05-15 PROCEDURE — 99232 SBSQ HOSP IP/OBS MODERATE 35: CPT

## 2018-05-15 PROCEDURE — 99233 SBSQ HOSP IP/OBS HIGH 50: CPT

## 2018-05-15 PROCEDURE — 99497 ADVNCD CARE PLAN 30 MIN: CPT | Mod: 25

## 2018-05-15 PROCEDURE — 99498 ADVNCD CARE PLAN ADDL 30 MIN: CPT | Mod: 25

## 2018-05-15 RX ORDER — POLYETHYLENE GLYCOL 3350 17 G/17G
17 POWDER, FOR SOLUTION ORAL DAILY
Qty: 0 | Refills: 0 | Status: DISCONTINUED | OUTPATIENT
Start: 2018-05-15 | End: 2018-05-17

## 2018-05-15 RX ORDER — POTASSIUM CHLORIDE 20 MEQ
40 PACKET (EA) ORAL ONCE
Qty: 0 | Refills: 0 | Status: COMPLETED | OUTPATIENT
Start: 2018-05-15 | End: 2018-05-15

## 2018-05-15 RX ORDER — HEPARIN SODIUM 5000 [USP'U]/ML
1000 INJECTION INTRAVENOUS; SUBCUTANEOUS
Qty: 25000 | Refills: 0 | Status: DISCONTINUED | OUTPATIENT
Start: 2018-05-15 | End: 2018-05-15

## 2018-05-15 RX ORDER — HEPARIN SODIUM 5000 [USP'U]/ML
1000 INJECTION INTRAVENOUS; SUBCUTANEOUS
Qty: 25000 | Refills: 0 | Status: DISCONTINUED | OUTPATIENT
Start: 2018-05-15 | End: 2018-05-16

## 2018-05-15 RX ORDER — HEPARIN SODIUM 5000 [USP'U]/ML
1100 INJECTION INTRAVENOUS; SUBCUTANEOUS
Qty: 25000 | Refills: 0 | Status: DISCONTINUED | OUTPATIENT
Start: 2018-05-15 | End: 2018-05-15

## 2018-05-15 RX ORDER — HEPARIN SODIUM 5000 [USP'U]/ML
900 INJECTION INTRAVENOUS; SUBCUTANEOUS
Qty: 25000 | Refills: 0 | Status: DISCONTINUED | OUTPATIENT
Start: 2018-05-15 | End: 2018-05-15

## 2018-05-15 RX ORDER — POTASSIUM CHLORIDE 20 MEQ
40 PACKET (EA) ORAL ONCE
Qty: 0 | Refills: 0 | Status: DISCONTINUED | OUTPATIENT
Start: 2018-05-15 | End: 2018-05-15

## 2018-05-15 RX ADMIN — ATORVASTATIN CALCIUM 40 MILLIGRAM(S): 80 TABLET, FILM COATED ORAL at 21:46

## 2018-05-15 RX ADMIN — CLOPIDOGREL BISULFATE 75 MILLIGRAM(S): 75 TABLET, FILM COATED ORAL at 12:09

## 2018-05-15 RX ADMIN — BUMETANIDE 2 MILLIGRAM(S): 0.25 INJECTION INTRAMUSCULAR; INTRAVENOUS at 13:59

## 2018-05-15 RX ADMIN — CARVEDILOL PHOSPHATE 6.25 MILLIGRAM(S): 80 CAPSULE, EXTENDED RELEASE ORAL at 18:05

## 2018-05-15 RX ADMIN — Medication 650 MILLIGRAM(S): at 09:53

## 2018-05-15 RX ADMIN — BUMETANIDE 2 MILLIGRAM(S): 0.25 INJECTION INTRAMUSCULAR; INTRAVENOUS at 05:48

## 2018-05-15 RX ADMIN — Medication 81 MILLIGRAM(S): at 12:09

## 2018-05-15 RX ADMIN — Medication 50 MILLILITER(S): at 21:45

## 2018-05-15 RX ADMIN — Medication 50 MILLILITER(S): at 05:44

## 2018-05-15 RX ADMIN — HEPARIN SODIUM 10 UNIT(S)/HR: 5000 INJECTION INTRAVENOUS; SUBCUTANEOUS at 12:16

## 2018-05-15 RX ADMIN — Medication 40 MILLIEQUIVALENT(S): at 07:12

## 2018-05-15 RX ADMIN — CARVEDILOL PHOSPHATE 6.25 MILLIGRAM(S): 80 CAPSULE, EXTENDED RELEASE ORAL at 05:48

## 2018-05-15 RX ADMIN — POLYETHYLENE GLYCOL 3350 17 GRAM(S): 17 POWDER, FOR SOLUTION ORAL at 16:51

## 2018-05-15 RX ADMIN — BUMETANIDE 2 MILLIGRAM(S): 0.25 INJECTION INTRAMUSCULAR; INTRAVENOUS at 21:46

## 2018-05-15 RX ADMIN — Medication 650 MILLIGRAM(S): at 09:23

## 2018-05-15 RX ADMIN — Medication 50 MILLILITER(S): at 13:10

## 2018-05-15 NOTE — PROGRESS NOTE ADULT - ASSESSMENT
CKD stage 3. Fluid overload. Newly diagnosed nephrotic syndrome.    Suggest:    1. Continue lasix, aim net negative 1 - 2 l/d.  2. Follow SCr.  3. Continue coreg.    Please call with any questions.    Benjie Martinez MD, FACP, FASN | kidney.[facility]  Nephrology, Hypertension, and Internal Medicine  Mobile: [phone] (Daytime Hours Only)  Office/Answering Service: [phone]  Asst. Prof. of Medicine, [facility]  [facility] - Nephrology at [address]  [address], [address], [place], [place]

## 2018-05-15 NOTE — PROGRESS NOTE ADULT - PROBLEM SELECTOR PLAN 5
ECHO w/ Bubble Study 5/8 w/ EF 35-40%, mod conc LVH, LV global hypokinesis, LA mildly dilated, RA dilated, trace MR, mild TR, mild pulm HTN,  EF 4/20 50%  tx per cardiology team.

## 2018-05-15 NOTE — CHART NOTE - NSCHARTNOTEFT_GEN_A_CORE
Admitting Diagnosis:   Patient is a 89y old  Female who presents with a chief complaint of SOB (10 May 2018 18:30)      PAST MEDICAL & SURGICAL HISTORY:  CHF (congestive heart failure)  Atrial fibrillation  HTN (hypertension)  H/O right knee surgery      Current Nutrition Order:DASh with 1 liter        PO Intake: Good (%) [   ]  Fair (50-75%) [x   ] Poor (<25%) [   ]    GI Issues: none    Pain:  none  Skin Integrity:intact    Labs:   05-15    134<L>  |  92<L>  |  53<H>  ----------------------------<  105<H>  3.7   |  33<H>  |  1.58<H>    Ca    8.7      15 May 2018 06:07  Mg     2.0     05-15    TPro  5.8<L>  /  Alb  2.5<L>  /  TBili  0.3  /  DBili  <0.2  /  AST  24  /  ALT  14  /  AlkPhos  73      CAPILLARY BLOOD GLUCOSE      POCT Blood Glucose.: 106 mg/dL (15 May 2018 11:42)      Medications:  MEDICATIONS  (STANDING):  albumin human 25% IVPB 50 milliLiter(s) IV Intermittent every 8 hours  aspirin  chewable 81 milliGRAM(s) Oral daily  atorvastatin 40 milliGRAM(s) Oral at bedtime  buMETAnide Injectable 2 milliGRAM(s) IV Push every 8 hours  carvedilol 6.25 milliGRAM(s) Oral every 12 hours  clopidogrel Tablet 75 milliGRAM(s) Oral daily  heparin  Infusion 1000 Unit(s)/Hr (10 mL/Hr) IV Continuous <Continuous>  metolazone 5 milliGRAM(s) Oral daily    MEDICATIONS  (PRN):  acetaminophen   Tablet. 650 milliGRAM(s) Oral every 6 hours PRN Moderate Pain (4 - 6)  aluminum hydroxide/magnesium hydroxide/simethicone Suspension 30 milliLiter(s) Oral every 6 hours PRN Dyspepsia      Weight:  Daily     Daily Weight in k.4 (15 May 2018 08:30)    Weight Change: 10lb weight loss since admission    Estimated energy needs: Based on IBW(wawcv515% of IBW):52kg [id number]:1040-1300kcal and 1.-1.2gmprotein:52-62gmprotein and 1 liter fluids    Subjective: 89 y/o female admitted with SOB. Eating adequate amounts.    Previous Nutrition Diagnosis:Food and nutrition deficit     Active [   ]  Resolved [ x  ]    If resolved, new PES:     Goal:Meet 75% of needs consistently .Pending GOC noted    Recommendations:1.Daily weights    Education: previously completed    Risk Level: High [   ] Moderate [ x  ] Low [   ]

## 2018-05-15 NOTE — PROGRESS NOTE ADULT - ASSESSMENT
89yo F with PMHx of Afib on Eliquis, HFpEF 50%, and HTN who presents with progressive dyspnea on exertion, lazarus LE edema 5/7,  admitted to [facility] for acute on chronic CHF exacerbation likely in setting of NSTEMI, now found to have nephrotic syndrome     Problem/Plan - 1:  ·  Problem: Nephrotic syndrome.  Plan: Patient found to have nephrotic syndrome (albumin 2.1, protein in urine and peripheral edema).   -Heparin gtt restarted as patient at risk for thrombotic disease as discussed with Dr. Kemp and Michelle, monitor PTT and dose accordingly. Per Dr. Kemp, discussed anticoagulation risks vs. benefits in detail and family does not want to continue anticoagulation upon discharge and are only agreeable to aspirin   - renal consulted, appreciate recs   - patient is not a candidate for kidney biopsy, empiric therapy with immunosuppressive agents, or therapy for amyloid/myeloma, even if present. Family does not prefer diagnostic interventions.   - c/w IV Bumex 2mg TID   - c/w Metolazone 5mg QD   - c/w Concentrated albumin 25%, to be given 15 min prior to bumex doses  - Goal net negative 1.5L-2L   - Monitor Lytes BID and replete PRN  - c/w Coreg 6.25 mg BID   - monitor BUN/Creat  - palliative c/s pending.     Problem/Plan - 2:  ·  Problem: CHF (congestive heart failure).  Plan: Pt w/ previous Hx of HFpEF (EF 50-55% on echo 4/2018) presenting with worsening exertional dyspnea, decreased exercise tolerance, orthopnea, increased lazarus LE edema x 3 weeks. CXR w/ pulmonary vascular congestion, elevated cardiac enzymes peaked at 0.78, BNP 9464. Acute systolic CHF exacerbation likely 2/2 ischemia vs Lasix failure vs tachyarrhythmias. Patient given Lasix 60mg IV in ED with minimal urine output   - ECHO w/ Bubble Study 5/8 w/ EF 35-40%, mod conc LVH, LV global hypokinesis, LA mildly dilated, RA dilated, trace MR, mild TR, mild pulm HTN, PASP 42  - Nephrotic syndrome contributing to volume overload , which could be related to minimal change disease, membranous nephropathy, focal sclerosis, or amyloid/myeloma.  - c/w IV Bumex 2mg TID   - c/w Metolazone 5mg QD   - c/w Concentrated albumin 25%, to be given 15 min prior to bumex doses  - Goal net negative 1.5L-2L   - Monitor Lytes BID and replete PRN  - c/w Coreg 6.25 mg BID   - Outpt valsartan HELD 2/2 ANNETTE on CKD, consider restarting as BUN/Creat downtrending   - Trop peaked at 0.78. Medical management for NSTEMI at this time per Dr Kemp  - EKG noting Afib w/ LBBB (prior EKGs w/ incomplete LBBB last admission)  - Strict I/Os, daily weights, 1L fluid restrictions  - palliative c/s pending.     Problem/Plan - 3:  ·  Problem: NSTEMI (non-ST elevated myocardial infarction).  Plan: Pt w/ elevated Troponin on admission to 0.06, complaining of occasional intermittent exertional chest tightness when bending over which relieves with rest and sitting up. No current chest pain, only complaining of epigastric pain.  - Trop peaked at 0.78  - EKG noting Afib w/ LBBB (previous EKG w/ incomplete LBBB)  - S/p ASA/Plavix load. C/w ASA 81mg qd, Plavix 75mg qd   - C/w Heparin drip as patient at risk for thrombotic disease as discussed with Dr. Kemp and Michelle, monitor PTT and dose accordingly. Per Dr. Kemp, discussed anticoagulation risks vs. benefits in detail and family does not want to continue anticoagulation upon discharge and is only agreeable to aspirin   - c/w Lipitor 40mg QHS   - c/w Coreg 6.25 mg BID   - Repeat EKG Afib w/ LBBB (previous EKG w/ incomplete LBBB)  - Echo w/ newly depressed EF 35-50%, from 50-55% last month.     Problem/Plan - 4:  ·  Problem: ANNETTE (acute kidney injury).  Plan: Patient presenting with Cr 1.90, baseline of 1.4 earlier this year though in prior records as low as 0.8. Likely element of CKD as well.  - FEUr consistent w/ prerenal disease  - Renal US, no hydronephrosis.   - Avoid nephrotoxic agents  - Renally dose all meds  - Outpt valsartan HELD 2/2 ANNETTE on CKD, consider restarting as BUN/Creat downtrending   - F/u Renal Dr Schwimmer recs.     Problem/Plan - 5:  ·  Problem: Hyponatremia.  Plan: Patient with hyponatremia on admission to 130, likely due to acute on chronic CHF exacerbation, hypervolemic hyponatremia.  - Na 130 today  - Serum Osm and Urine Osm WNL   - F/u Renal Dr Michelle del angel.     Problem/Plan - 6:  Problem: Atrial fibrillation. Plan: Patient with history of Afib, previously on Cardizem and Eliquis, now on Atenolol for rate control. Patient's Eliquis was discontinued as outpt and placed on ASA. ADR listed to Metoprolol however family states she "got loopy".  - c/w Coreg 6.25 mg BID   - Pt taken off Eliquis per Dr Kemp as outpt per family request  - c/w ASA 81mg qd  - C/w Heparin drip as patient at risk for thrombotic disease as discussed with Dr. Kemp and Michelle, monitor PTT and dose accordingly. Per Dr. Kemp, discussed anticoagulation risks vs. benefits in detail and family does not want to continue anticoagulation upon discharge and is only agreeable to aspirin     #Hypertension  Patient with history of HTN on Atenolol, Norvasc, valsartan at home  - Currently normotensive  - c/w Coreg 6.25 mg BID   - HOLD Valsartan in setting of ANNETTE on CKD, consider restarting as BUN/Creat downtrending.    Problem/Plan - 7:  ·  Problem: Hypoalbuminemia.  Plan: Patient with albumin of 2.1 on admission with reports of nausea, decreased PO intake for the past few weeks, likely related to fluid overload and poor PO intake. Albumin 2.5 today.- Concentrated albumin 25% added to regimen to be given 15 min prior to bumex doses  - Trend CMP  - Nutrition consult.

## 2018-05-15 NOTE — PROGRESS NOTE ADULT - PROBLEM SELECTOR PLAN 1
Patient found to have nephrotic syndrome (albumin 2.1, protein in urine and peripheral edema).   -Heparin gtt restarted as patient at risk for thrombotic disease as discussed with Dr. Kemp and Michelle, monitor PTT and dose accordingly. Per Dr. Kemp, discussed anticoagulation risks vs. benefits in detail and family does not want to continue anticoagulation upon discharge and are only agreeable to aspirin   - renal consulted, appreciate recs   - patient is not a candidate for kidney biopsy, empiric therapy with immunosuppressive agents, or therapy for amyloid/myeloma, even if present. Family does not prefer diagnostic interventions.   - c/w IV Bumex 2mg TID   - c/w Metolazone 5mg QD   - c/w Concentrated albumin 25%, to be given 15 min prior to bumex doses  - Goal net negative 1.5L-2L   - Monitor Lytes BID and replete PRN  - c/w Coreg 6.25 mg BID   - monitor BUN/Creat  - palliative c/s, pt to go home with family to [place] with home hospice

## 2018-05-15 NOTE — PROGRESS NOTE ADULT - PROBLEM SELECTOR PLAN 3
Albumin 2.1 on admission likely 2/2 poor po intake over past few weeks and fluid overload  Albumin given prior to Bumex.

## 2018-05-15 NOTE — PROGRESS NOTE ADULT - SUBJECTIVE AND OBJECTIVE BOX
CARDIOLOGY NP PROGRESS NOTE    Subjective:   Remainder ROS otherwise negative.    Overnight Events:     TELEMETRY:    EKG:      VITAL SIGNS:  T(C): 36.7 (05-15-18 @ 17:59), Max: 36.9 (05-14-18 @ 18:25)  HR: 94 (05-15-18 @ 18:07) (82 - 110)  BP: 123/73 (05-15-18 @ 18:07) (114/63 - 140/68)  RR: 18 (05-15-18 @ 18:07) (15 - 18)  SpO2: 95% (05-15-18 @ 18:07) (94% - 97%)  Wt(kg): --    I&O's Summary    14 May 2018 07:01  -  15 May 2018 07:00  --------------------------------------------------------  IN: 894 mL / OUT: 2525 mL / NET: -1631 mL    15 May 2018 07:01  -  15 May 2018 18:22  --------------------------------------------------------  IN: 536 mL / OUT: 2300 mL / NET: -1764 mL          PHYSICAL EXAM:    General: A/ox 3, No acute Distress  Neck: Supple, NO JVD  Cardiac: S1 S2, No M/R/G  Pulmonary: CTAB, Breathing unlabored, No Rhonchi/Rales/Wheezing  Abdomen: Soft, Non -tender, +BS x 4 quads  Extremities: No Rashes, No edema  Neuro: A/o x 3, No focal deficits          LABS:                          11.3   6.7   )-----------( 172      ( 15 May 2018 06:08 )             34.7                              05-15    134<L>  |  92<L>  |  53<H>  ----------------------------<  105<H>  3.7   |  33<H>  |  1.58<H>    Ca    8.7      15 May 2018 06:07  Mg     2.0     05-15    TPro  5.8<L>  /  Alb  2.5<L>  /  TBili  0.3  /  DBili  <0.2  /  AST  24  /  ALT  14  /  AlkPhos  73  05-14    LIVER FUNCTIONS - ( 14 May 2018 06:28 )  Alb: 2.5 g/dL / Pro: 5.8 g/dL / ALK PHOS: 73 U/L / ALT: 14 U/L / AST: 24 U/L / GGT: x                                 PT/INR - ( 15 May 2018 06:07 )   PT: 11.6 sec;   INR: 1.04          PTT - ( 15 May 2018 11:02 )  PTT:53.3 sec  CAPILLARY BLOOD GLUCOSE      POCT Blood Glucose.: 132 mg/dL (15 May 2018 16:00)  POCT Blood Glucose.: 106 mg/dL (15 May 2018 11:42)            Allergies:  metoprolol (Unknown)  penicillin (Rash)    MEDICATIONS  (STANDING):  albumin human 25% IVPB 50 milliLiter(s) IV Intermittent every 8 hours  aspirin  chewable 81 milliGRAM(s) Oral daily  atorvastatin 40 milliGRAM(s) Oral at bedtime  buMETAnide Injectable 2 milliGRAM(s) IV Push every 8 hours  carvedilol 6.25 milliGRAM(s) Oral every 12 hours  clopidogrel Tablet 75 milliGRAM(s) Oral daily  heparin  Infusion 1000 Unit(s)/Hr (10 mL/Hr) IV Continuous <Continuous>  metolazone 5 milliGRAM(s) Oral daily  polyethylene glycol 3350 17 Gram(s) Oral daily    MEDICATIONS  (PRN):  acetaminophen   Tablet. 650 milliGRAM(s) Oral every 6 hours PRN Moderate Pain (4 - 6)  aluminum hydroxide/magnesium hydroxide/simethicone Suspension 30 milliLiter(s) Oral every 6 hours PRN Dyspepsia        DIAGNOSTIC TESTS: CARDIOLOGY NP PROGRESS NOTE    Subjective: Patient seen and examined at bedside. Patient states nausea and abdominal pain has improved. C/O BACK and B/L LE edema still present/affecting her ambulation.  Denies CP, dizziness/diaphoresis, n/v, palpitations. Remainder ROS otherwise negative.    Overnight Events: No acute events overnight     TELEMETRY: A        VITAL SIGNS:  T(C): 36.7 (05-15-18 @ 17:59), Max: 36.9 (05-14-18 @ 18:25)  HR: 94 (05-15-18 @ 18:07) (82 - 110)  BP: 123/73 (05-15-18 @ 18:07) (114/63 - 140/68)  RR: 18 (05-15-18 @ 18:07) (15 - 18)  SpO2: 95% (05-15-18 @ 18:07) (94% - 97%)  Wt(kg): --    I&O's Summary    14 May 2018 07:01  -  15 May 2018 07:00  --------------------------------------------------------  IN: 894 mL / OUT: 2525 mL / NET: -1631 mL    15 May 2018 07:01  -  15 May 2018 18:22  --------------------------------------------------------  IN: 536 mL / OUT: 2300 mL / NET: -1764 mL          PHYSICAL EXAM:    General: A/ox 3, No acute Distress  Neck: Supple, NO JVD  Cardiac: S1 S2, No M/R/G  Pulmonary: CTAB, Breathing unlabored, No Rhonchi/Rales/Wheezing  Abdomen: Soft, Non -tender, +BS x 4 quads  Extremities: No Rashes, No edema  Neuro: A/o x 3, No focal deficits          LABS:                          11.3   6.7   )-----------( 172      ( 15 May 2018 06:08 )             34.7                              05-15    134<L>  |  92<L>  |  53<H>  ----------------------------<  105<H>  3.7   |  33<H>  |  1.58<H>    Ca    8.7      15 May 2018 06:07  Mg     2.0     05-15    TPro  5.8<L>  /  Alb  2.5<L>  /  TBili  0.3  /  DBili  <0.2  /  AST  24  /  ALT  14  /  AlkPhos  73  05-14    LIVER FUNCTIONS - ( 14 May 2018 06:28 )  Alb: 2.5 g/dL / Pro: 5.8 g/dL / ALK PHOS: 73 U/L / ALT: 14 U/L / AST: 24 U/L / GGT: x                                 PT/INR - ( 15 May 2018 06:07 )   PT: 11.6 sec;   INR: 1.04          PTT - ( 15 May 2018 11:02 )  PTT:53.3 sec  CAPILLARY BLOOD GLUCOSE      POCT Blood Glucose.: 132 mg/dL (15 May 2018 16:00)  POCT Blood Glucose.: 106 mg/dL (15 May 2018 11:42)            Allergies:  metoprolol (Unknown)  penicillin (Rash)    MEDICATIONS  (STANDING):  albumin human 25% IVPB 50 milliLiter(s) IV Intermittent every 8 hours  aspirin  chewable 81 milliGRAM(s) Oral daily  atorvastatin 40 milliGRAM(s) Oral at bedtime  buMETAnide Injectable 2 milliGRAM(s) IV Push every 8 hours  carvedilol 6.25 milliGRAM(s) Oral every 12 hours  clopidogrel Tablet 75 milliGRAM(s) Oral daily  heparin  Infusion 1000 Unit(s)/Hr (10 mL/Hr) IV Continuous <Continuous>  metolazone 5 milliGRAM(s) Oral daily  polyethylene glycol 3350 17 Gram(s) Oral daily    MEDICATIONS  (PRN):  acetaminophen   Tablet. 650 milliGRAM(s) Oral every 6 hours PRN Moderate Pain (4 - 6)  aluminum hydroxide/magnesium hydroxide/simethicone Suspension 30 milliLiter(s) Oral every 6 hours PRN Dyspepsia        DIAGNOSTIC TESTS: CARDIOLOGY NP PROGRESS NOTE    Subjective: Patient seen and examined at bedside. Patient states nausea and abdominal pain has improved. C/O BACK and B/L LE edema still present/affecting her ambulation.  Denies CP, dizziness/diaphoresis, n/v, palpitations. Remainder ROS otherwise negative.    Overnight Events: No acute events overnight     TELEMETRY: Afib (80s-110s), HR 130s on exertion         VITAL SIGNS:  T(C): 36.7 (05-15-18 @ 17:59), Max: 36.9 (05-14-18 @ 18:25)  HR: 94 (05-15-18 @ 18:07) (82 - 110)  BP: 123/73 (05-15-18 @ 18:07) (114/63 - 140/68)  RR: 18 (05-15-18 @ 18:07) (15 - 18)  SpO2: 95% (05-15-18 @ 18:07) (94% - 97%)  Wt(kg): --    I&O's Summary    14 May 2018 07:01  -  15 May 2018 07:00  --------------------------------------------------------  IN: 894 mL / OUT: 2525 mL / NET: -1631 mL    15 May 2018 07:01  -  15 May 2018 18:22  --------------------------------------------------------  IN: 536 mL / OUT: 2300 mL / NET: -1764 mL          PHYSICAL EXAM:    General: A/ox 3, No acute Distress  Neck: Supple, NO JVD  Cardiac: S1 S2, No M/R/G  Pulmonary:  Diminished breath sounds throughout with Bibasilar crackles. No wheezing   Abdomen: Soft, Non -tender, +BS x 4 quads  Extremities: +3 pitting edema RLE/+2 pitting edema LLE extending to knees   Neuro: A/o x 3, No focal deficits          LABS:                          11.3   6.7   )-----------( 172      ( 15 May 2018 06:08 )             34.7                              05-15    134<L>  |  92<L>  |  53<H>  ----------------------------<  105<H>  3.7   |  33<H>  |  1.58<H>    Ca    8.7      15 May 2018 06:07  Mg     2.0     05-15    TPro  5.8<L>  /  Alb  2.5<L>  /  TBili  0.3  /  DBili  <0.2  /  AST  24  /  ALT  14  /  AlkPhos  73  05-14    LIVER FUNCTIONS - ( 14 May 2018 06:28 )  Alb: 2.5 g/dL / Pro: 5.8 g/dL / ALK PHOS: 73 U/L / ALT: 14 U/L / AST: 24 U/L / GGT: x                                 PT/INR - ( 15 May 2018 06:07 )   PT: 11.6 sec;   INR: 1.04          PTT - ( 15 May 2018 11:02 )  PTT:53.3 sec  CAPILLARY BLOOD GLUCOSE      POCT Blood Glucose.: 132 mg/dL (15 May 2018 16:00)  POCT Blood Glucose.: 106 mg/dL (15 May 2018 11:42)            Allergies:  metoprolol (Unknown)  penicillin (Rash)    MEDICATIONS  (STANDING):  albumin human 25% IVPB 50 milliLiter(s) IV Intermittent every 8 hours  aspirin  chewable 81 milliGRAM(s) Oral daily  atorvastatin 40 milliGRAM(s) Oral at bedtime  buMETAnide Injectable 2 milliGRAM(s) IV Push every 8 hours  carvedilol 6.25 milliGRAM(s) Oral every 12 hours  clopidogrel Tablet 75 milliGRAM(s) Oral daily  heparin  Infusion 1000 Unit(s)/Hr (10 mL/Hr) IV Continuous <Continuous>  metolazone 5 milliGRAM(s) Oral daily  polyethylene glycol 3350 17 Gram(s) Oral daily    MEDICATIONS  (PRN):  acetaminophen   Tablet. 650 milliGRAM(s) Oral every 6 hours PRN Moderate Pain (4 - 6)  aluminum hydroxide/magnesium hydroxide/simethicone Suspension 30 milliLiter(s) Oral every 6 hours PRN Dyspepsia

## 2018-05-15 NOTE — PROGRESS NOTE ADULT - PROBLEM SELECTOR PLAN 7
Patient with albumin of 2.1 on admission with reports of nausea, decreased PO intake for the past few weeks, likely related to fluid overload and poor PO intake. Albumin 2.5 (5/14).  - Concentrated albumin 25% added to regimen to be given 15 min prior to bumex doses  - Trend CMP  - Nutrition consult

## 2018-05-15 NOTE — PROGRESS NOTE ADULT - ASSESSMENT
89yo F with PMHx of Afib on Eliquis, HFpEF 50%, and HTN who presents with progressive dyspnea on exertion, lazarus LE edema 5/7,  admitted to tele for acute on chronic CHF exacerbation likely in setting of NSTEMI, now found to have nephrotic syndrome pending home hospice care

## 2018-05-15 NOTE — PROGRESS NOTE ADULT - PROBLEM SELECTOR PLAN 6
2/2 fluid overload  using cane prior to admission, now with use of walker  followed by PT, activity as sharon.

## 2018-05-15 NOTE — PROGRESS NOTE ADULT - ASSESSMENT
89yo F with PMHx of Afib on Eliquis and HTN who presents with progressive dyspnea on exertion, lazarus LE edema admitted for acute on chronic CHF exacerbation likely in setting of NSTEMI, now found to have nephrotic syndrome

## 2018-05-15 NOTE — PROGRESS NOTE ADULT - PROBLEM SELECTOR PLAN 4
pt with hx of HERNANDO on home Cardizem and Eliquis  now on Atenelol for rate control   Taken off Eliquis and stared on ASA, Heparin, Plavix.

## 2018-05-15 NOTE — PROGRESS NOTE ADULT - PROBLEM SELECTOR PLAN 5
Patient with hyponatremia on admission to 130, likely due to acute on chronic CHF exacerbation, hypervolemic hyponatremia.  - Na 134 today  - Serum Osm and Urine Osm WNL   - F/u Renal Dr Michelle del angel

## 2018-05-15 NOTE — PROGRESS NOTE ADULT - PROBLEM SELECTOR PLAN 10
PT reggie, home with home PT    Palliative c/s: Plan pt to go home to [place] with family for home hospice

## 2018-05-15 NOTE — PROGRESS NOTE ADULT - SUBJECTIVE AND OBJECTIVE BOX
MCKINLEY MCCARTY   MRN-[id number]        HPI:  Patient is a 89yo F with a PMHx of A fib on Eliquis, HFpEF 50%, HTN with multiple recent admissions to [facility] for A fib w/RVR and CHF exacerbation who presents with progressive dyspnea on exertion and shortness of breath. Patient states that since she was hospitalized in April her lower extremity edema has been worse and her exercise tolerance has decreased to the point she will only ambulate to the bathroom with the aide of a cane, otherwise opting to stay in bed. Patient also reports decreasing urinary output over the last month despite increase in her home dose of Lasix to 40mg BID. Patient endorses that she sleeps with her back propped up and is unsure if she can lay flat without orthopnea. She states she has occasional chest pressure particularly when ambulating associated with shortness of breath, relieved by rest. Patient currently denies chest pain or palpitations, but endorses constant epigastric pain and decreased PO intake over the past few weeks. Denies fevers, chills, cough, dysuria, vomiting, diarrhea. Currently breathing comfortably on nasal cannula with lower extremity edema.  Patient found to have nephrotic syndrome (albumin 2.1, protein in urine and peripheral edema).     In the ED, VS T 97.5, HR 92, /85, R 18, SpO2 94% on room air. Labs without leukocytosis or anemia, normal coags, hyponatremia to 130, Cr of 1.98 (baseline 1.4), albumin of 2.1, CKMB 7.6, Troponin 0.06, BNP 9464. EKG showing rate controlled atrial fibrillation with a LBBB unchanged from prior studies, CXR with pulmonary vascular congestion. Patient given 60mg IV Lasix and admitted to [facility] for management of acute on chronic CHF exacerbation. (07 May 2018 19:41)      PAST MEDICAL & SURGICAL HISTORY:  CHF (congestive heart failure)  Atrial fibrillation  HTN (hypertension)  H/O right knee surgery  H/O hip surgery 2005    ROS:                    Dyspnea (Geo 0-10):    Yes  Geo 3/10                    N/V (Y/N):  N                            Secretions (Y/N) : N                Agitation(Y/N): N  Pain (Y/N):  pt denies       General:  Denied  HEENT:    Denied  Neck:  Denied  CVS: anasarca, SOB  Resp: Denied  GI:  dyspepsia  :  Denied  Musc:  weakness  Neuro:  Denied  Psych:  Denied  Skin:  Denied  Lymph:  Denied    Allergies    metoprolol (Unknown)  penicillin (Rash)    Intolerances    Opiate Naive (Y/N): Yes  -iStop reviewed (Y/N): Yes | Reference #: [id number]     Medications:      MEDICATIONS  (STANDING):  albumin human 25% IVPB 50 milliLiter(s) IV Intermittent every 8 hours  aspirin  chewable 81 milliGRAM(s) Oral daily  atorvastatin 40 milliGRAM(s) Oral at bedtime  buMETAnide Injectable 2 milliGRAM(s) IV Push every 8 hours  carvedilol 6.25 milliGRAM(s) Oral every 12 hours  clopidogrel Tablet 75 milliGRAM(s) Oral daily  heparin  Infusion 700 Unit(s)/Hr (7 mL/Hr) IV Continuous <Continuous>  metolazone 5 milliGRAM(s) Oral daily    MEDICATIONS  (PRN):  acetaminophen   Tablet. 650 milliGRAM(s) Oral every 6 hours PRN Moderate Pain (4 - 6)  aluminum hydroxide/magnesium hydroxide/simethicone Suspension 30 milliLiter(s) Oral every 6 hours PRN Dyspepsia    Labs:                          11.3   6.7   )-----------( 172      ( 15 May 2018 06:08 )             34.7   05-15    134<L>  |  92<L>  |  53<H>  ----------------------------<  105<H>  3.7   |  33<H>  |  1.58<H>    Ca    8.7      15 May 2018 06:07  Mg     2.0     05-15    TPro  5.8<L>  /  Alb  2.5<L>  /  TBili  0.3  /  DBili  <0.2  /  AST  24  /  ALT  14  /  AlkPhos  73  05-14    Imaging:   EXAM:  XR CHEST PORTABLE ROUTINE 1V                          PROCEDURE DATE:  05/15/2018       INTERPRETATION:    PORTABLE CHEST X-RAY    Indication: Shortness of Breath. CHF. A. fib. Hypertension.    Bedside examination of the chest dated 5/15/2018 7:24 AM is compared to   the previous study of 5/14/2018.    Findings: No change mild pulmonary vascular congestion, bilateral pleural   effusions, and bibasilar atelectasis.    Impression: No change.  .  PEx:  Vital Signs Last 24 Hrs  T(C): 36.7 (15 May 2018 17:59), Max: 36.9 (15 May 2018 05:28)  T(F): 98 (15 May 2018 17:59), Max: 98.5 (15 May 2018 05:28)  HR: 94 (15 May 2018 18:07) (82 - 110)  BP: 123/73 (15 May 2018 18:07) (114/63 - 140/68)  BP(mean): 108 (15 May 2018 18:07) (73 - 108)  RR: 18 (15 May 2018 18:07) (15 - 18)  SpO2: 95% (15 May 2018 18:07) (94% - 97%)P    --------------------------------------------------------  IGeneral: elderly female appearing younger than stated age, in NAD   HEENT: N/C A/T PERRL, sclera anicteric   Neck: supple NO JVD   CVS: S1S2 RRR tachycardic, no MRG +2-3  B/L anasarca to thighs  Resp: bibasilar crackles, poor respiratory effort  GI:  softly distended, + excessive flatus, last BM 5/12  :  voiding large amounts of urine   Musc: weakness    Neuro: no focal deficits  Psych: calm and cooperative    Skin: WDI  Lymph: No LAD  Preadmit Karnofsky: 60 %           Current Karnofsky: 40%  Cachexia (Y/N): N  BMI: 36    Advanced Directives:   Full Code     Decision maker: pt along with Riana Mccarty: [phone] and Brooke Mccarty [phone] (dgts)  Legal surrogates: Riana Mccarty: [phone] and Brooke Mccarty [phone] (dgts)      GOALS OF CARE DISCUSSION       Palliative care info/counseling provided	           Family meeting scheduled for 5/14 3:30 PM       Advanced Directivesto be discussed at           Documentation of GOC: conservative medical management of symptoms, will futher        discuss dispo     	          REFERRALS	        Palliative Med        Unit SW/Case Mgmt       Massage Therapy       Music Therapy       Hospice       Nutrition        PT/OT

## 2018-05-15 NOTE — PROGRESS NOTE ADULT - PROBLEM SELECTOR PLAN 1
pt found with nephrotic syndrome as evidenced by Alb 2.1, peripheral edema and protein spilling into urine   Cardiology team with discussion to benefit vs burdens of anticoagulation therapy . Ok with Heparin, Plavix and ASA while inpt but agree only to ASA once D/C   not a candidate for renal biopsy, family deferring any workup  Bumex, Albumin and Zaroxylyn per card team.

## 2018-05-15 NOTE — PROGRESS NOTE ADULT - PROBLEM SELECTOR PLAN 2
elevated troponins on admission with chest tightness  No further chest discomfort  continues with c/o epigastric discomfort  Lipitor and Coreg in addition to AC per card team.

## 2018-05-15 NOTE — PROGRESS NOTE ADULT - PROBLEM SELECTOR PLAN 7
Support provided to patient and family. Patient to have access to supportive services during rest of hospital stay as the patient/family deemed necessary ie. Chaplaincy, Massage therapy, Music therapy, Patient and family supportive services, Palliative SW, etc.    As discussed during the palliative IDT meeting, the patients PSSA screening did not identify any current psychosocial need or spiritual support deficits.

## 2018-05-15 NOTE — PROGRESS NOTE ADULT - SUBJECTIVE AND OBJECTIVE BOX
Physical Medicine and Rehabilitation Progress Note    MCKINLEY MCCARTY    MRN-[id number]    Patient is a 89y old  Female who presents with a chief complaint of SOB (10 May 2018 18:30)      Vital Signs Last 24 Hrs  T(C): 36.9 (15 May 2018 05:28), Max: 36.9 (14 May 2018 18:25)  T(F): 98.5 (15 May 2018 05:28), Max: 98.5 (14 May 2018 18:25)  HR: 90 (15 May 2018 08:30) (82 - 110)  BP: 114/63 (15 May 2018 08:30) (114/63 - 140/73)  BP(mean): 95 (15 May 2018 08:30) (73 - 102)  RR: 18 (15 May 2018 08:30) (15 - 18)  SpO2: 97% (15 May 2018 08:30) (94% - 97%)    Current Functional Status in Physical Therapy:  OOB in chair with both legs elevated.  Alert and stable, edema both legs; no significant changes.  Deconditioned.  On PT for therpeutic ex.  and gait training    Bed Mobility:    Transfers:  contact guarding    Ambulation: with cane, contact guarding      Impression:  1.  Deconditioned   2 . NSTEMI   3.  CHF      Recommendations:  Continue PT

## 2018-05-15 NOTE — PROGRESS NOTE ADULT - PROBLEM SELECTOR PLAN 8
Advance care planning meeting  Start time: 2:30 P  End time: 3:20 P  Total time: 50 min    A face to face meeting to discuss advance care planning was held today regarding: MCKINLEY MCCARTY  Primary decision maker:   Alternate/surrogate:  Discussed advance directives including, but not limited to, healthcare proxy and code status.  Decision regarding code status: needs further discussion  Documentation completed today: CIRA  Met with pt her daughter Brooke and nephew Ashwin. Present was Nataliya Day NP cardiology Discussed present hospital course and anticipated cardiac issues moving forward.  All agree that they do not want to pursue aggressive work up and tx, they wish for pt to be conservatively  medically managed  Dr Marshall to reconcile to home med regimen   Pt is now agreeable to moving in with her daughter in her [place] [facility]. All are agreeable to home hospice services for optimal symptom management. Referral made to [facility] Inadvertently overlooked formal DNR/DNI discussion, but being , in line with her goals DNR/DNI would be  appropriate  Will formalize decision in AM

## 2018-05-15 NOTE — PROGRESS NOTE ADULT - PROBLEM SELECTOR PLAN 2
Pt w/ previous Hx of HFpEF (EF 50-55% on echo 4/2018) presenting with worsening exertional dyspnea, decreased exercise tolerance, orthopnea, increased lazarus LE edema x 3 weeks. CXR w/ pulmonary vascular congestion, elevated cardiac enzymes peaked at 0.78, BNP 9464. Acute systolic CHF exacerbation likely 2/2 ischemia vs Lasix failure vs tachyarrhythmias. Patient given Lasix 60mg IV in ED with minimal urine output   - ECHO w/ Bubble Study 5/8 w/ EF 35-40%, mod conc LVH, LV global hypokinesis, LA mildly dilated, RA dilated, trace MR, mild TR, mild pulm HTN, PASP 42  - Nephrotic syndrome contributing to volume overload , which could be related to minimal change disease, membranous nephropathy, focal sclerosis, or amyloid/myeloma.  - c/w IV Bumex 2mg TID   - c/w Metolazone 5mg QD   - c/w Concentrated albumin 25%, to be given 15 min prior to bumex doses  - Goal net negative 1.5L-2L   - Monitor Lytes BID and replete PRN  - c/w Coreg 6.25 mg BID   - Outpt valsartan HELD 2/2 ANNETTE on CKD, consider restarting as BUN/Creat downtrending   - Trop peaked at 0.78. Medical management for NSTEMI at this time per Dr Kemp  - EKG noting Afib w/ LBBB (prior EKGs w/ incomplete LBBB last admission)  - Strict I/Os, daily weights, 1L fluid restrictions  - palliative c/s, pt to go home with family to [place] with home hospice

## 2018-05-15 NOTE — PROGRESS NOTE ADULT - SUBJECTIVE AND OBJECTIVE BOX
CC: SOB    INTERVAL HISTORY:    Abdomen: soft. nontender. no masses.  ontrolled. * CKD stable. * Net - on lasix.    ROS: No chest pain. No shortness of breath. No nausea.    PAST MEDICAL & SURGICAL HISTORY:  CHF (congestive heart failure)  Atrial fibrillation  HTN (hypertension)  H/O right knee surgery  No significant past surgical history  No significant past surgical history    PHYSICAL EXAM:  T(C): 36.7 (15 May 2018 17:59), Max: 36.9 (15 May 2018 05:28)  HR: 94 (15 May 2018 18:07)  BP: 123/73 (15 May 2018 18:07) (114/63 - 140/68)  RR: 18 (15 May 2018 18:07)  SpO2: 95% (15 May 2018 18:07)      14 May 2018 07:01  -  15 May 2018 07:00  --------------------------------------------------------  IN:    heparin Infusion: 77 mL    heparin Infusion: 70 mL    IV PiggyBack: 50 mL    Oral Fluid: 697 mL  Total IN: 894 mL    OUT:    Voided: 2525 mL  Total OUT: 2525 mL    Total NET: -1631 mL      15 May 2018 07:01  -  15 May 2018 20:28  --------------------------------------------------------  IN:    heparin Infusion: 28 mL    heparin Infusion: 50 mL    Oral Fluid: 658 mL  Total IN: 736 mL    OUT:    Voided: 2300 mL  Total OUT: 2300 mL    Total NET: -1564 mL        Weight     Appearance: alert, pleasant, INAD.    ENT: oral mucosa moist, no pallor/cyanosis.    Neck: no JVD visible.    Cardiac: no rubs. no murmurs. Extremities: pitting legs    Skin: no rashes.    Extremities (digits): no clubbing or cyanosis.    Respratory effort: no access muscle use. Lungs: decreased breath sounds    Abdomen: soft. nontender. no masses.    Psych affect: not depressed. Orientation: person, place, situation.     MEDICATIONS  (STANDING):  albumin human 25% IVPB 50 milliLiter(s) IV Intermittent every 8 hours  aspirin  chewable 81 milliGRAM(s) Oral daily  atorvastatin 40 milliGRAM(s) Oral at bedtime  buMETAnide Injectable 2 milliGRAM(s) IV Push every 8 hours  carvedilol 6.25 milliGRAM(s) Oral every 12 hours  clopidogrel Tablet 75 milliGRAM(s) Oral daily  heparin  Infusion 1000 Unit(s)/Hr (10 mL/Hr) IV Continuous <Continuous>  metolazone 5 milliGRAM(s) Oral daily  polyethylene glycol 3350 17 Gram(s) Oral daily    MEDICATIONS  (PRN):  acetaminophen   Tablet. 650 milliGRAM(s) Oral every 6 hours PRN Moderate Pain (4 - 6)  aluminum hydroxide/magnesium hydroxide/simethicone Suspension 30 milliLiter(s) Oral every 6 hours PRN Dyspepsia    DATA:  130<L>  |  85<L>  |  52<H>  ----------------------------<  163<H>  Ca:9.3   (15 May 2018 19:03)  5.5<H>   |  34<H>  |  1.61<H>      eGFR if Non : 27 <L>  eGFR if : 32 <L>    TPro  5.8<L>  /  Alb  2.5<L>  /  TBili  0.3    /  DBili  <0.2   /  AST  24     /  ALT  14     /  AlkPhos  73     14 May 2018 06:28    SCr 1.61 [15 May 2018 19:03]  SCr 1.58 [15 May 2018 06:07]  SCr 1.67 [14 May 2018 19:09]  SCr 1.67 [14 May 2018 06:28]  SCr 1.79 [13 May 2018 07:13]                          11.3<L>  6.7   )-----------( 172      ( 15 May 2018 06:08 )             34.7     Phos:-- M.0 mg/dL PTH:-- Uric acid:-- Serum Osm:--  Ferritin:-- Iron:-- TIBC:-- Tsat:--  B12:-- TSH:-- (15 May 2018 19:03)      UProt:-- UCr:26 mg/dL P/C Ratio:-- 24 hour Prot:-- UVol:-- CrCl:--  Gary:72 mmol/L UOsm:-- UVol:-- UCl:-- UK:-- (13 May 2018 15:44) CC: SOB    INTERVAL HISTORY:    * HTN controlled. * CKD stable. * Net - on lasix.    ROS: No chest pain. No shortness of breath. No nausea.    PAST MEDICAL & SURGICAL HISTORY:  CHF (congestive heart failure)  Atrial fibrillation  HTN (hypertension)  H/O right knee surgery  No significant past surgical history  No significant past surgical history    PHYSICAL EXAM:  T(C): 36.7 (15 May 2018 17:59), Max: 36.9 (15 May 2018 05:28)  HR: 94 (15 May 2018 18:07)  BP: 123/73 (15 May 2018 18:07) (114/63 - 140/68)  RR: 18 (15 May 2018 18:07)  SpO2: 95% (15 May 2018 18:07)      14 May 2018 07:01  -  15 May 2018 07:00  --------------------------------------------------------  IN:    heparin Infusion: 77 mL    heparin Infusion: 70 mL    IV PiggyBack: 50 mL    Oral Fluid: 697 mL  Total IN: 894 mL    OUT:    Voided: 2525 mL  Total OUT: 2525 mL    Total NET: -1631 mL      15 May 2018 07:01  -  15 May 2018 20:28  --------------------------------------------------------  IN:    heparin Infusion: 28 mL    heparin Infusion: 50 mL    Oral Fluid: 658 mL  Total IN: 736 mL    OUT:    Voided: 2300 mL  Total OUT: 2300 mL    Total NET: -1564 mL        Weight     Appearance: alert, pleasant, INAD.    ENT: oral mucosa moist, no pallor/cyanosis.    Neck: no JVD visible.    Cardiac: no rubs. no murmurs. Extremities: pitting legs    Skin: no rashes.    Extremities (digits): no clubbing or cyanosis.    Respratory effort: no access muscle use. Lungs: decreased breath sounds    Abdomen: soft. nontender. no masses.    Psych affect: not depressed. Orientation: person, place, situation.     MEDICATIONS  (STANDING):  albumin human 25% IVPB 50 milliLiter(s) IV Intermittent every 8 hours  aspirin  chewable 81 milliGRAM(s) Oral daily  atorvastatin 40 milliGRAM(s) Oral at bedtime  buMETAnide Injectable 2 milliGRAM(s) IV Push every 8 hours  carvedilol 6.25 milliGRAM(s) Oral every 12 hours  clopidogrel Tablet 75 milliGRAM(s) Oral daily  heparin  Infusion 1000 Unit(s)/Hr (10 mL/Hr) IV Continuous <Continuous>  metolazone 5 milliGRAM(s) Oral daily  polyethylene glycol 3350 17 Gram(s) Oral daily    MEDICATIONS  (PRN):  acetaminophen   Tablet. 650 milliGRAM(s) Oral every 6 hours PRN Moderate Pain (4 - 6)  aluminum hydroxide/magnesium hydroxide/simethicone Suspension 30 milliLiter(s) Oral every 6 hours PRN Dyspepsia    DATA:  130<L>  |  85<L>  |  52<H>  ----------------------------<  163<H>  Ca:9.3   (15 May 2018 19:03)  5.5<H>   |  34<H>  |  1.61<H>      eGFR if Non : 27 <L>  eGFR if : 32 <L>    TPro  5.8<L>  /  Alb  2.5<L>  /  TBili  0.3    /  DBili  <0.2   /  AST  24     /  ALT  14     /  AlkPhos  73     14 May 2018 06:28    SCr 1.61 [15 May 2018 19:03]  SCr 1.58 [15 May 2018 06:07]  SCr 1.67 [14 May 2018 19:09]  SCr 1.67 [14 May 2018 06:28]  SCr 1.79 [13 May 2018 07:13]                          11.3<L>  6.7   )-----------( 172      ( 15 May 2018 06:08 )             34.7     Phos:-- M.0 mg/dL PTH:-- Uric acid:-- Serum Osm:--  Ferritin:-- Iron:-- TIBC:-- Tsat:--  B12:-- TSH:-- (15 May 2018 19:03)      UProt:-- UCr:26 mg/dL P/C Ratio:-- 24 hour Prot:-- UVol:-- CrCl:--  Gary:72 mmol/L UOsm:-- UVol:-- UCl:-- UK:-- (13 May 2018 15:44)

## 2018-05-15 NOTE — PROGRESS NOTE ADULT - SUBJECTIVE AND OBJECTIVE BOX
Patient is a 89yo F with a PMHx of A fib on Eliquis, HFpEF 50%, HTN with multiple recent admissions to [facility] for A fib w/RVR and CHF exacerbation who presents with progressive dyspnea on exertion and shortness of breath for ..............    In the ED, VS T 97.5, HR 92, /85, R 18, SpO2 94% on room air. Labs without leukocytosis or anemia, normal coags, hyponatremia to 130, Cr of 1.98 (baseline 1.4), albumin of 2.1, CKMB 7.6, Troponin 0.06, BNP 9464. EKG showing rate controlled atrial fibrillation with a LBBB unchanged from prior studies, CXR with pulmonary vascular congestion. Patient given 60mg IV Lasix and admitted to [facility] for management of acute on chronic CHF exacerbation.     Past Medical History:  Atrial fibrillation    CHF (congestive heart failure)    HTN (hypertension).    Past Surgical History:  H/O right knee surgery.         Pt seen and examined at bedside on       ICU Vital Signs Last 24 Hrs  T(C): 36.7 (15 May 2018 15:00), Max: 36.9 (14 May 2018 18:25)  T(F): 98.1 (15 May 2018 15:00), Max: 98.5 (14 May 2018 18:25)  HR: 88 (15 May 2018 14:01) (82 - 110)  BP: 140/68 (15 May 2018 14:01) (114/63 - 140/68)  BP(mean): 95 (15 May 2018 14:01) (73 - 95)  ABP: --  ABP(mean): --  RR: 18 (15 May 2018 08:30) (15 - 18)  SpO2: 97% (15 May 2018 08:30) (94% - 97%)      PHYSICAL EXAM:    General: NAD  Neck: Supple, NO JVD  Cardiac: S1 S2, No M/R/G  Pulmonary:  Diminished breath sounds throughout with Bibasilar crackles. No wheezing   Abdomen: Soft, Non -tender, +BS x 4 quads  Extremities: +3 pitting edema RLE/+2 pitting edema LLE extending to knees   Neuro: AAOx3          LABS:                          11.4   9.4   )-----------( 186      ( 14 May 2018 06:28 )             35.0                              05-14    130<L>  |  89<L>  |  50<H>  ----------------------------<  114<H>  3.4<L>   |  33<H>  |  1.67<H>    Ca    8.6      14 May 2018 06:28  Mg     2.1     05-14    TPro  5.8<L>  /  Alb  2.5<L>  /  TBili  0.3  /  DBili  <0.2  /  AST  24  /  ALT  14  /  AlkPhos  73  05-14    LIVER FUNCTIONS - ( 14 May 2018 06:28 )  Alb: 2.5 g/dL / Pro: 5.8 g/dL / ALK PHOS: 73 U/L / ALT: 14 U/L / AST: 24 U/L / GGT: x                                 PT/INR - ( 14 May 2018 06:28 )   PT: 11.9 sec;   INR: 1.07          PTT - ( 14 May 2018 06:28 )  PTT:60.8 sec  CAPILLARY BLOOD GLUCOSE      POCT Blood Glucose.: 122 mg/dL (13 May 2018 21:11)            Allergies:  penicillin (Rash)    MEDICATIONS  (STANDING):  albumin human 25% IVPB 50 milliLiter(s) IV Intermittent every 8 hours  aspirin  chewable 81 milliGRAM(s) Oral daily  atorvastatin 40 milliGRAM(s) Oral at bedtime  buMETAnide Injectable 2 milliGRAM(s) IV Push every 8 hours  carvedilol 6.25 milliGRAM(s) Oral every 12 hours  clopidogrel Tablet 75 milliGRAM(s) Oral daily  heparin  Infusion 700 Unit(s)/Hr (7 mL/Hr) IV Continuous <Continuous>  metolazone 5 milliGRAM(s) Oral daily    MEDICATIONS  (PRN):  acetaminophen   Tablet. 650 milliGRAM(s) Oral every 6 hours PRN Moderate Pain (4 - 6)  aluminum hydroxide/magnesium hydroxide/simethicone Suspension 30 milliLiter(s) Oral every 6 hours PRN Dyspepsia

## 2018-05-16 ENCOUNTER — APPOINTMENT (OUTPATIENT)
Dept: HEART AND VASCULAR | Facility: CLINIC | Age: 83
End: 2018-05-16

## 2018-05-16 LAB
ANION GAP SERPL CALC-SCNC: 12 MMOL/L — SIGNIFICANT CHANGE UP (ref 5–17)
ANION GAP SERPL CALC-SCNC: 8 MMOL/L — SIGNIFICANT CHANGE UP (ref 5–17)
APTT BLD: 30.8 SEC — SIGNIFICANT CHANGE UP (ref 27.5–37.4)
APTT BLD: 83.3 SEC — HIGH (ref 27.5–37.4)
APTT BLD: 94.2 SEC — HIGH (ref 27.5–37.4)
BUN SERPL-MCNC: 48 MG/DL — HIGH (ref 7–23)
BUN SERPL-MCNC: 49 MG/DL — HIGH (ref 7–23)
CALCIUM SERPL-MCNC: 8.8 MG/DL — SIGNIFICANT CHANGE UP (ref 8.4–10.5)
CALCIUM SERPL-MCNC: 9.3 MG/DL — SIGNIFICANT CHANGE UP (ref 8.4–10.5)
CHLORIDE SERPL-SCNC: 87 MMOL/L — LOW (ref 96–108)
CHLORIDE SERPL-SCNC: 87 MMOL/L — LOW (ref 96–108)
CO2 SERPL-SCNC: 32 MMOL/L — HIGH (ref 22–31)
CO2 SERPL-SCNC: 34 MMOL/L — HIGH (ref 22–31)
CREAT ?TM UR-MCNC: 17 MG/DL — SIGNIFICANT CHANGE UP
CREAT SERPL-MCNC: 1.34 MG/DL — HIGH (ref 0.5–1.3)
CREAT SERPL-MCNC: 1.48 MG/DL — HIGH (ref 0.5–1.3)
GLUCOSE SERPL-MCNC: 116 MG/DL — HIGH (ref 70–99)
GLUCOSE SERPL-MCNC: 188 MG/DL — HIGH (ref 70–99)
HCT VFR BLD CALC: 33.5 % — LOW (ref 34.5–45)
HGB BLD-MCNC: 10.8 G/DL — LOW (ref 11.5–15.5)
INR BLD: 1.11 — SIGNIFICANT CHANGE UP (ref 0.88–1.16)
MAGNESIUM SERPL-MCNC: 2 MG/DL — SIGNIFICANT CHANGE UP (ref 1.6–2.6)
MAGNESIUM SERPL-MCNC: 2.1 MG/DL — SIGNIFICANT CHANGE UP (ref 1.6–2.6)
MCHC RBC-ENTMCNC: 30.7 PG — SIGNIFICANT CHANGE UP (ref 27–34)
MCHC RBC-ENTMCNC: 32.2 G/DL — SIGNIFICANT CHANGE UP (ref 32–36)
MCV RBC AUTO: 95.2 FL — SIGNIFICANT CHANGE UP (ref 80–100)
OSMOLALITY SERPL: 291 MOSM/KG — SIGNIFICANT CHANGE UP (ref 280–301)
PLATELET # BLD AUTO: 170 K/UL — SIGNIFICANT CHANGE UP (ref 150–400)
POTASSIUM SERPL-MCNC: 3.7 MMOL/L — SIGNIFICANT CHANGE UP (ref 3.5–5.3)
POTASSIUM SERPL-MCNC: 4.3 MMOL/L — SIGNIFICANT CHANGE UP (ref 3.5–5.3)
POTASSIUM SERPL-SCNC: 3.7 MMOL/L — SIGNIFICANT CHANGE UP (ref 3.5–5.3)
POTASSIUM SERPL-SCNC: 4.3 MMOL/L — SIGNIFICANT CHANGE UP (ref 3.5–5.3)
PROTHROM AB SERPL-ACNC: 12.3 SEC — SIGNIFICANT CHANGE UP (ref 9.8–12.7)
RBC # BLD: 3.52 M/UL — LOW (ref 3.8–5.2)
RBC # FLD: 13.8 % — SIGNIFICANT CHANGE UP (ref 10.3–16.9)
SODIUM SERPL-SCNC: 129 MMOL/L — LOW (ref 135–145)
SODIUM SERPL-SCNC: 131 MMOL/L — LOW (ref 135–145)
SODIUM UR-SCNC: 78 MMOL/L — SIGNIFICANT CHANGE UP
WBC # BLD: 7.7 K/UL — SIGNIFICANT CHANGE UP (ref 3.8–10.5)
WBC # FLD AUTO: 7.7 K/UL — SIGNIFICANT CHANGE UP (ref 3.8–10.5)

## 2018-05-16 PROCEDURE — 99233 SBSQ HOSP IP/OBS HIGH 50: CPT

## 2018-05-16 PROCEDURE — 99497 ADVNCD CARE PLAN 30 MIN: CPT | Mod: 25

## 2018-05-16 PROCEDURE — 99232 SBSQ HOSP IP/OBS MODERATE 35: CPT

## 2018-05-16 PROCEDURE — 71045 X-RAY EXAM CHEST 1 VIEW: CPT | Mod: 26

## 2018-05-16 RX ORDER — HEPARIN SODIUM 5000 [USP'U]/ML
900 INJECTION INTRAVENOUS; SUBCUTANEOUS
Qty: 25000 | Refills: 0 | Status: DISCONTINUED | OUTPATIENT
Start: 2018-05-16 | End: 2018-05-16

## 2018-05-16 RX ORDER — POTASSIUM CHLORIDE 20 MEQ
40 PACKET (EA) ORAL ONCE
Qty: 0 | Refills: 0 | Status: COMPLETED | OUTPATIENT
Start: 2018-05-16 | End: 2018-05-16

## 2018-05-16 RX ORDER — HEPARIN SODIUM 5000 [USP'U]/ML
7500 INJECTION INTRAVENOUS; SUBCUTANEOUS EVERY 8 HOURS
Qty: 0 | Refills: 0 | Status: DISCONTINUED | OUTPATIENT
Start: 2018-05-16 | End: 2018-05-16

## 2018-05-16 RX ORDER — HEPARIN SODIUM 5000 [USP'U]/ML
1000 INJECTION INTRAVENOUS; SUBCUTANEOUS
Qty: 25000 | Refills: 0 | Status: DISCONTINUED | OUTPATIENT
Start: 2018-05-16 | End: 2018-05-17

## 2018-05-16 RX ORDER — HEPARIN SODIUM 5000 [USP'U]/ML
5000 INJECTION INTRAVENOUS; SUBCUTANEOUS EVERY 8 HOURS
Qty: 0 | Refills: 0 | Status: DISCONTINUED | OUTPATIENT
Start: 2018-05-16 | End: 2018-05-16

## 2018-05-16 RX ADMIN — BUMETANIDE 2 MILLIGRAM(S): 0.25 INJECTION INTRAMUSCULAR; INTRAVENOUS at 07:05

## 2018-05-16 RX ADMIN — Medication 40 MILLIEQUIVALENT(S): at 07:05

## 2018-05-16 RX ADMIN — Medication 50 MILLILITER(S): at 21:01

## 2018-05-16 RX ADMIN — BUMETANIDE 2 MILLIGRAM(S): 0.25 INJECTION INTRAMUSCULAR; INTRAVENOUS at 14:29

## 2018-05-16 RX ADMIN — CLOPIDOGREL BISULFATE 75 MILLIGRAM(S): 75 TABLET, FILM COATED ORAL at 11:44

## 2018-05-16 RX ADMIN — HEPARIN SODIUM 10 UNIT(S)/HR: 5000 INJECTION INTRAVENOUS; SUBCUTANEOUS at 16:51

## 2018-05-16 RX ADMIN — POLYETHYLENE GLYCOL 3350 17 GRAM(S): 17 POWDER, FOR SOLUTION ORAL at 11:44

## 2018-05-16 RX ADMIN — Medication 50 MILLILITER(S): at 13:06

## 2018-05-16 RX ADMIN — ATORVASTATIN CALCIUM 40 MILLIGRAM(S): 80 TABLET, FILM COATED ORAL at 21:01

## 2018-05-16 RX ADMIN — CARVEDILOL PHOSPHATE 6.25 MILLIGRAM(S): 80 CAPSULE, EXTENDED RELEASE ORAL at 07:05

## 2018-05-16 RX ADMIN — CARVEDILOL PHOSPHATE 6.25 MILLIGRAM(S): 80 CAPSULE, EXTENDED RELEASE ORAL at 17:55

## 2018-05-16 RX ADMIN — HEPARIN SODIUM 9 UNIT(S)/HR: 5000 INJECTION INTRAVENOUS; SUBCUTANEOUS at 14:29

## 2018-05-16 RX ADMIN — Medication 50 MILLILITER(S): at 06:45

## 2018-05-16 RX ADMIN — Medication 81 MILLIGRAM(S): at 11:44

## 2018-05-16 RX ADMIN — BUMETANIDE 2 MILLIGRAM(S): 0.25 INJECTION INTRAMUSCULAR; INTRAVENOUS at 21:01

## 2018-05-16 NOTE — PROGRESS NOTE ADULT - PROBLEM SELECTOR PLAN 2
Pt w/ previous Hx of HFpEF (EF 50-55% on echo 4/2018) presenting with worsening exertional dyspnea, decreased exercise tolerance, orthopnea, increased lazarus LE edema x 3 weeks. CXR w/ pulmonary vascular congestion, elevated cardiac enzymes peaked at 0.78, BNP 9464. Acute systolic CHF exacerbation likely 2/2 ischemia vs Lasix failure vs tachyarrhythmias. Patient given Lasix 60mg IV in ED with minimal urine output   - ECHO w/ Bubble Study 5/8 w/ EF 35-40%, mod conc LVH, LV global hypokinesis, LA mildly dilated, RA dilated, trace MR, mild TR, mild pulm HTN, PASP 42  - Nephrotic syndrome contributing to volume overload , which could be related to minimal change disease, membranous nephropathy, focal sclerosis, or amyloid/myeloma.  - c/w IV Bumex 2mg TID   - c/w Metolazone 5mg QD   - c/w Concentrated albumin 25%, to be given 15 min prior to bumex doses  - Goal net negative 1.5L-2L   - Monitor Lytes BID and replete PRN  - c/w Coreg 6.25 mg BID   - Outpt valsartan HELD 2/2 ANNETTE on CKD, consider restarting as BUN/Creat downtrending   - Trop peaked at 0.78. Medical management for NSTEMI at this time per Dr Kemp  - EKG noting Afib w/ LBBB (prior EKGs w/ incomplete LBBB last admission)  - Strict I/Os, daily weights, 1L fluid restrictions  - palliative c/s, pt to go home with family to [place] with home hospice and transition to PO medications upon discharge

## 2018-05-16 NOTE — PROGRESS NOTE ADULT - SUBJECTIVE AND OBJECTIVE BOX
MCKINLEY MCCARTY   MRN-[id number]        cc: leg heaviness, SOB    HPI:  Patient is a 89yo F with a PMHx of A fib on Eliquis, HFpEF 50%, HTN with multiple recent admissions to [facility] for A fib w/RVR and CHF exacerbation who presents with progressive dyspnea on exertion and shortness of breath. Patient states that since she was hospitalized in April her lower extremity edema has been worse and her exercise tolerance has decreased to the point she will only ambulate to the bathroom with the aide of a cane, otherwise opting to stay in bed. Patient also reports decreasing urinary output over the last month despite increase in her home dose of Lasix to 40mg BID. Patient endorses that she sleeps with her back propped up and is unsure if she can lay flat without orthopnea. She states she has occasional chest pressure particularly when ambulating associated with shortness of breath, relieved by rest. Patient currently denies chest pain or palpitations, but endorses constant epigastric pain and decreased PO intake over the past few weeks. Denies fevers, chills, cough, dysuria, vomiting, diarrhea. Currently breathing comfortably on nasal cannula with lower extremity edema.  Patient found to have nephrotic syndrome (albumin 2.1, protein in urine and peripheral edema).     In the ED, VS T 97.5, HR 92, /85, R 18, SpO2 94% on room air. Labs without leukocytosis or anemia, normal coags, hyponatremia to 130, Cr of 1.98 (baseline 1.4), albumin of 2.1, CKMB 7.6, Troponin 0.06, BNP 9464. EKG showing rate controlled atrial fibrillation with a LBBB unchanged from prior studies, CXR with pulmonary vascular congestion. Patient given 60mg IV Lasix and admitted to [facility] for management of acute on chronic CHF exacerbation. (07 May 2018 19:41)  Palliative Medicine consulted to assist with GOC    Subjective: pt in chair, states frustration with functional decline, c/o leg heaviness  ROS:                    Dyspnea (Geo 0-10):    Yes  Geo 4/10                    N/V (Y/N):  N                            Secretions (Y/N) : N                Agitation(Y/N): N  Pain (Y/N):  pt denies         Other Review of Systems: + anasarca, +SOB, + weakness, + fatigue    Allergies    metoprolol (Unknown)  penicillin (Rash)    Intolerances    Opiate Naive (Y/N): Yes  -iStop reviewed (Y/N): Yes | Reference #: [id number]     Medications:      MEDICATIONS  (STANDING):  albumin human 25% IVPB 50 milliLiter(s) IV Intermittent every 8 hours  aspirin  chewable 81 milliGRAM(s) Oral daily  atorvastatin 40 milliGRAM(s) Oral at bedtime  buMETAnide Injectable 2 milliGRAM(s) IV Push every 8 hours  carvedilol 6.25 milliGRAM(s) Oral every 12 hours  clopidogrel Tablet 75 milliGRAM(s) Oral daily  heparin  Infusion 1000 Unit(s)/Hr (10 mL/Hr) IV Continuous <Continuous>  metolazone 5 milliGRAM(s) Oral daily  polyethylene glycol 3350 17 Gram(s) Oral daily    MEDICATIONS  (PRN):  acetaminophen   Tablet. 650 milliGRAM(s) Oral every 6 hours PRN Moderate Pain (4 - 6)  aluminum hydroxide/magnesium hydroxide/simethicone Suspension 30 milliLiter(s) Oral every 6 hours PRN Dyspepsia  bisacodyl Suppository 10 milliGRAM(s) Rectal daily PRN Constipation      Labs:                          10.8   7.7   )-----------( 170      ( 16 May 2018 02:52 )             33.5   05-16    129<L>  |  87<L>  |  48<H>  ----------------------------<  116<H>  3.7   |  34<H>  |  1.48<H>    Ca    8.8      16 May 2018 02:52  Mg     2.0     05-16    Imaging:   EXAM:  XR CHEST PORTABLE ROUTINE 1V                          PROCEDURE DATE:  05/16/2018       INTERPRETATION:    PORTABLE CHEST X-RAY    Indication: Shortness of Breath. Nephrotic syndrome. CHF.    Bedside examination of the chest dated 5/16/2018 6:56 AM is compared to   the previous study of 5/15/2018.    Findings: No change mild pulmonary vascular congestion, bilateral pleural   effusions, and bibasilar atelectasis.    Impression: No change.    PEx:  Vital Signs Last 24 Hrs  T(C): 36.5 (16 May 2018 14:00), Max: 36.8 (16 May 2018 05:05)  T(F): 97.7 (16 May 2018 14:00), Max: 98.2 (16 May 2018 05:05)  HR: 86 (16 May 2018 13:15) (84 - 120)  BP: 116/58 (16 May 2018 13:15) (98/64 - 140/81)  BP(mean): 80 (16 May 2018 13:15) (80 - 110)  RR: 17 (16 May 2018 13:15) (16 - 18)  SpO2: 95% (16 May 2018 13:15) (95% - 98%)    General: elderly female appearing younger than stated age, c/o heaviness to her legs  HEENT: N/C A/T PERRL, sclera anicteric   Neck: supple NO JVD   CVS: S1S2 RRR  no MRG +3 B/L anasarca t thighs  Resp: bibasilar crackles, poor respiratory effort  GI:  softly distended, + excessive flatus, last BM 5/15  :  voiding large amounts of urine   Musc: weakness    Neuro: no focal deficits  Psych: calm and cooperative    Skin: WDI  Lymph: No LAD    Advanced Directives:     Full Code     Decision maker: pt along with Riana Mccarty: [phone] and Brooke Mccarty [phone] (dgts)  Legal surrogates: Riana Mccarty: [phone] and Brooke Mccarty [phone] (dgts)      Social History: born in [place], came to [place] at age 26, lives  alone with cleaning help 1x weekly,  x 30 yrs, 2 daughters (one in [place], the other in [place]) worked as homemaker    GOALS OF CARE DISCUSSION       Palliative care info/counseling provided	           Family meeting scheduled for 5/14 3:30 PM       Advanced Directivesto be discussed at           Documentation of GOC: conservative medical management of symptoms,  pt to d/c to her daughter Brooke home in [place] with  services          REFERRALS	        Palliative Med        Unit SW/Case Mgmt       Massage Therapy       Music Therapy       Hospice       Nutrition        PT/OT

## 2018-05-16 NOTE — PROGRESS NOTE ADULT - ASSESSMENT
89yo F with PMHx of Afib on Eliquis, HFpEF 50%, and HTN who presents with progressive dyspnea on exertion, lazarus LE edema 5/7,  admitted to [facility] for acute on chronic CHF exacerbation likely in setting of NSTEMI, now found to have nephrotic syndrome pending home hospice care     Problem/Plan - 1:  ·  Problem: Nephrotic syndrome.  Plan: Patient found to have nephrotic syndrome (albumin 2.1, protein in urine and peripheral edema).   -Heparin gtt restarted as patient at risk for thrombotic disease as discussed with Dr. Kemp and Michelle, monitor PTT and dose accordingly. Per Dr. Kemp, discussed anticoagulation risks vs. benefits in detail and family does not want to continue anticoagulation upon discharge and are only agreeable to aspirin   - renal consulted, appreciate recs   - patient is not a candidate for kidney biopsy, empiric therapy with immunosuppressive agents, or therapy for amyloid/myeloma, even if present. Family does not prefer diagnostic interventions.   - c/w IV Bumex 2mg TID   - c/w Metolazone 5mg QD   - c/w Concentrated albumin 25%, to be given 15 min prior to bumex doses  - Goal net negative 1.5L-2L   - Monitor Lytes BID and replete PRN  - c/w Coreg 6.25 mg BID   - monitor BUN/Creat  - palliative c/s, pt to go home with family to [place] with home hospice and transition to PO medications upon discharge.     Problem/Plan - 2:  ·  Problem: CHF (congestive heart failure).  Plan: Pt w/ previous Hx of HFpEF (EF 50-55% on echo 4/2018) presenting with worsening exertional dyspnea, decreased exercise tolerance, orthopnea, increased lazarus LE edema x 3 weeks. CXR w/ pulmonary vascular congestion, elevated cardiac enzymes peaked at 0.78, BNP 9464. Acute systolic CHF exacerbation likely 2/2 ischemia vs Lasix failure vs tachyarrhythmias. Patient given Lasix 60mg IV in ED with minimal urine output   - ECHO w/ Bubble Study 5/8 w/ EF 35-40%, mod conc LVH, LV global hypokinesis, LA mildly dilated, RA dilated, trace MR, mild TR, mild pulm HTN, PASP 42  - Nephrotic syndrome contributing to volume overload , which could be related to minimal change disease, membranous nephropathy, focal sclerosis, or amyloid/myeloma.  - c/w IV Bumex 2mg TID   - c/w Metolazone 5mg QD   - c/w Concentrated albumin 25%, to be given 15 min prior to bumex doses  - Goal net negative 1.5L-2L   - Monitor Lytes BID and replete PRN  - c/w Coreg 6.25 mg BID   - Outpt valsartan HELD 2/2 ANNETTE on CKD, consider restarting as BUN/Creat downtrending   - Trop peaked at 0.78. Medical management for NSTEMI at this time per Dr Kemp  - EKG noting Afib w/ LBBB (prior EKGs w/ incomplete LBBB last admission)  - Strict I/Os, daily weights, 1L fluid restrictions  - palliative c/s, pt to go home with family to [place] with home hospice and transition to PO medications upon discharge.     Problem/Plan - 3:  ·  Problem: NSTEMI (non-ST elevated myocardial infarction).  Plan: Pt w/ elevated Troponin on admission to 0.06, complaining of occasional intermittent exertional chest tightness when bending over which relieves with rest and sitting up. No current chest pain, only complaining of epigastric pain.  - Trop peaked at 0.78  - EKG noting Afib w/ LBBB (previous EKG w/ incomplete LBBB)  - S/p ASA/Plavix load. C/w ASA 81mg qd, Plavix 75mg qd   - C/w Heparin drip as patient at risk for thrombotic disease as discussed with Dr. Kemp and Michelle, monitor PTT and dose accordingly. Per Dr. Kemp, discussed anticoagulation risks vs. benefits in detail and family does not want to continue anticoagulation upon discharge and is only agreeable to aspirin   - c/w Lipitor 40mg QHS   - c/w Coreg 6.25 mg BID   - Repeat EKG Afib w/ LBBB (previous EKG w/ incomplete LBBB)  - Echo w/ newly depressed EF 35-50%, from 50-55% last month.     Problem/Plan - 4:  ·  Problem: ANNETTE (acute kidney injury).  Plan: Patient presenting with Cr 1.90, baseline of 1.4 earlier this year though in prior records as low as 0.8. Likely element of CKD as well.  - FEUr consistent w/ prerenal disease  - Renal US, no hydronephrosis.   - Avoid nephrotoxic agents  - Renally dose all meds  - Outpt valsartan HELD 2/2 ANNETTE on CKD, consider restarting as BUN/Creat downtrending   - F/u Renal Dr Michelle del angel.     Problem/Plan - 5:  ·  Problem: Hyponatremia.  Plan: Patient with hyponatremia on admission to 130, likely due to acute on chronic CHF exacerbation, hypervolemic hyponatremia.  - Na 129 today. Denies HA, dizziness, no change in LOC.   - monitor NA   - Serum Osm and Urine Osm WNL   - F/u Renal Dr Michelle del angel.     Problem/Plan - 6:  Problem: Atrial fibrillation. Plan: Patient with history of Afib, previously on Cardizem and Eliquis, now on Atenolol for rate control. Patient's Eliquis was discontinued as outpt and placed on ASA. ADR listed to Metoprolol however family states she "got loopy".  - c/w Coreg 6.25 mg BID   - Pt taken off Eliquis per Dr Kemp as outpt per family request  - c/w ASA 81mg qd  - C/w Heparin drip as patient at risk for thrombotic disease as discussed with Dr. Kemp and Michelle, monitor PTT and dose accordingly. Per Dr. Kemp, discussed anticoagulation risks vs. benefits in detail and family does not want to continue anticoagulation upon discharge and is only agreeable to aspirin     #Hypertension  Patient with history of HTN on Atenolol, Norvasc, valsartan at home  - Currently normotensive  - c/w Coreg 6.25 mg BID   - HOLD Valsartan in setting of ANNETTE on CKD, consider restarting as BUN/Creat downtrending.    Problem/Plan - 7:  ·  Problem: Hypoalbuminemia.  Plan: Patient with albumin of 2.1 on admission with reports of nausea, decreased PO intake for the past few weeks, likely related to fluid overload and poor PO intake. Albumin 2.5 (5/14).  - Concentrated albumin 25% added to regimen to be given 15 min prior to bumex doses  - Trend CMP  - Nutrition consult.     Problem/Plan - 8:  ·  Problem: Nutrition, metabolism, and development symptoms.  Plan: DASH/TLC diet, 1L fluid restriction  Nutrition consult as above given hypoalbuminemia and decreased appetite  No IVF at this time  Replete electrolytes PRN to K > 4, Mg > 2.     Problem/Plan - 9:  ·  Problem: Need for other prophylactic measure.  Plan: DVT PPX: IV Heparin gtt   C: FULL CODE, Pt's 2 daughters are HCP.     Problem/Plan - 10:  Problem: Discharge planning issues. Plan; PT eval, home with home PT    Palliative c/s: Plan pt to go home to [place] with family for home hospice. MOLST form to be filled out prior to discharge.

## 2018-05-16 NOTE — PROGRESS NOTE ADULT - PROBLEM SELECTOR PLAN 1
Patient found to have nephrotic syndrome (albumin 2.1, protein in urine and peripheral edema).   -Heparin gtt restarted as patient at risk for thrombotic disease as discussed with Dr. Kemp and Michelle, monitor PTT and dose accordingly. Per Dr. Kemp, discussed anticoagulation risks vs. benefits in detail and family does not want to continue anticoagulation upon discharge and are only agreeable to aspirin   - renal consulted, appreciate recs   - patient is not a candidate for kidney biopsy, empiric therapy with immunosuppressive agents, or therapy for amyloid/myeloma, even if present. Family does not prefer diagnostic interventions.   - c/w IV Bumex 2mg TID   - c/w Metolazone 5mg QD   - c/w Concentrated albumin 25%, to be given 15 min prior to bumex doses  - Goal net negative 1.5L-2L   - Monitor Lytes BID and replete PRN  - c/w Coreg 6.25 mg BID   - monitor BUN/Creat  - palliative c/s, pt to go home with family to [place] with home hospice and transition to PO medications upon discharge

## 2018-05-16 NOTE — PROGRESS NOTE ADULT - PROBLEM SELECTOR PLAN 6
/2 fluid overload  using cane prior to admission, now with use of walker  followed by PT, activity as sharon.

## 2018-05-16 NOTE — PROGRESS NOTE ADULT - ATTENDING COMMENTS
CKD stage 3. Fluid overload. Newly diagnosed nephrotic syndrome.    Suggest:    1. Continue lasix and metolazone (for palliation).  2. Follow SCr while inpatient. If on hospice, no need to check as outpatient.  3. Continue coreg.    Please call with any questions.    Benjie Martinez MD, FACP, FASN | kidney.[facility]  Nephrology, Hypertension, and Internal Medicine  Mobile: [phone] (Daytime Hours Only)  Office/Answering Service: [phone]  Asst. Prof. of Medicine, [employer]  [facility] - Nephrology at [address]  [address], [address], [place], [place]
Assessment: Patient personally seen and examined myself during rounds with the Physician Assistant/House Staff/Nurse Practitioner   Physician Assistant/House Staff/Nurse Practitioner note read, including vitals, physical findings, laboratory data, and radiological reports.   Revisions included below.   Direct personal management at bed side and extensive interpretation of the data.    Plan was outlined and discussed in details with the Physician Assistant/House Staff/Nurse Practitioner.    Decision making of high complexity   Risk high of complications, morbidity, and/or mortality  Assessment and Action taken for acute disease activity to reflect the level of care provided:  -Hemodynamic evaluation and support  -Medication reconciliation  -Review laboratory data  -Cardiac Telemetry reviewed  - Continues to be volume overloaded changed to bumex, nephrotic syndrome, goc will be addressed  -EKG reviewed   -Echo reviewed   -ACS assessment and treatment as applicable  -Heart failure assessment and treatment as applicable  -Interdisciplinary discussion with IC / EP / HF / CTS teams as needed  TIME SPENT in evaluation and management, reassessments, review and interpretation of labs and x-rays, and hemodynamic management, formulating a plan and coordinating care: ___35____ MIN.  Time does not include procedural time.
Assessment: Patient personally seen and examined myself during rounds with the Physician Assistant/House Staff/Nurse Practitioner   Physician Assistant/House Staff/Nurse Practitioner note read, including vitals, physical findings, laboratory data, and radiological reports.   Revisions included below.   Direct personal management at bed side and extensive interpretation of the data.    Plan was outlined and discussed in details with the Physician Assistant/House Staff/Nurse Practitioner.    Decision making of high complexity   Risk high of complications, morbidity, and/or mortality  Assessment and Action taken for acute disease activity to reflect the level of care provided:  -Hemodynamic evaluation and support  -Medication reconciliation  -Review laboratory data  -Cardiac Telemetry reviewed  - nstemi mgmt continue as qwell as rate control and aggressive diuresis needed for new depressed ef and nerphrotic syndrome  -EKG reviewed   -Echo reviewed   -ACS assessment and treatment as applicable  -Heart failure assessment and treatment as applicable  -Interdisciplinary discussion with IC / EP / HF / CTS teams as needed  TIME SPENT in evaluation and management, reassessments, review and interpretation of labs and x-rays, and hemodynamic management, formulating a plan and coordinating care: ___35____ MIN.  Time does not include procedural time.
Assessment: Patient personally seen and examined myself during rounds with the Physician Assistant/House Staff/Nurse Practitioner   Physician Assistant/House Staff/Nurse Practitioner note read, including vitals, physical findings, laboratory data, and radiological reports.   Revisions included below.   Direct personal management at bed side and extensive interpretation of the data.    Plan was outlined and discussed in details with the Physician Assistant/House Staff/Nurse Practitioner.    Decision making of high complexity   Risk high of complications, morbidity, and/or mortality  Assessment and Action taken for acute disease activity to reflect the level of care provided:  -Hemodynamic evaluation and support  -Medication reconciliation  -Review laboratory data  -Cardiac Telemetry reviewed  - volume overloaded will consider change lasix to bumex, and goc discussion with family  -EKG reviewed   -Echo reviewed   -ACS assessment and treatment as applicable  -Heart failure assessment and treatment as applicable  -Interdisciplinary discussion with IC / EP / HF / CTS teams as needed  TIME SPENT in evaluation and management, reassessments, review and interpretation of labs and x-rays, and hemodynamic management, formulating a plan and coordinating care: ___35____ MIN.  Time does not include procedural time.
Assessment: Patient personally seen and examined myself during rounds with the Physician Assistant/House Staff/Nurse Practitioner   Physician Assistant/House Staff/Nurse Practitioner note read, including vitals, physical findings, laboratory data, and radiological reports.   Revisions included below.   Direct personal management at bed side and extensive interpretation of the data.    Plan was outlined and discussed in details with the Physician Assistant/House Staff/Nurse Practitioner.    Decision making of high complexity   Risk high of complications, morbidity, and/or mortality  Assessment and Action taken for acute disease activity to reflect the level of care provided:  -Hemodynamic evaluation and support  -Medication reconciliation  -Review laboratory data  -EKG reviewed   - palliative recs    TIME SPENT in evaluation and management, reassessments, review and interpretation of labs and x-rays, and hemodynamic management, formulating a plan and coordinating care: ___25____ MIN.  Time does not include procedural time.
Assessment: Patient personally seen and examined myself during rounds with the Physician Assistant/House Staff/Nurse Practitioner   Physician Assistant/House Staff/Nurse Practitioner note read, including vitals, physical findings, laboratory data, and radiological reports.   Revisions included below.   Direct personal management at bed side and extensive interpretation of the data.    Plan was outlined and discussed in details with the Physician Assistant/House Staff/Nurse Practitioner.    Decision making of high complexity   Risk high of complications, morbidity, and/or mortality  Assessment and Action taken for acute disease activity to reflect the level of care provided:  -Hemodynamic evaluation and support  -Medication reconciliation  -Review laboratory data  -Cardiac Telemetry reviewed  - extensive discussion with hcp daughter, d/w renal - pt likely has nephrotic syndrome in addition of heart failure compounding need for more aggressive adiuresis  -EKG reviewed   -Echo reviewed   -ACS assessment and treatment as applicable  -Heart failure assessment and treatment as applicable  -Interdisciplinary discussion with IC / EP / HF / CTS teams as needed  TIME SPENT in evaluation and management, reassessments, review and interpretation of labs and x-rays, and hemodynamic management, formulating a plan and coordinating care: ___35____ MIN.  Time does not include procedural time.
Assessment: Patient personally seen and examined myself during rounds with the Physician Assistant/House Staff/Nurse Practitioner   Physician Assistant/House Staff/Nurse Practitioner note read, including vitals, physical findings, laboratory data, and radiological reports.   Revisions included below.   Direct personal management at bed side and extensive interpretation of the data.    Plan was outlined and discussed in details with the Physician Assistant/House Staff/Nurse Practitioner.    Decision making of high complexity   Risk high of complications, morbidity, and/or mortality  Assessment and Action taken for acute disease activity to reflect the level of care provided:  -Hemodynamic evaluation and support  -Medication reconciliation  -Review laboratory data  -Cardiac Telemetry reviewed  - acute systolic HF likely ischemic in origin, recommend med mgmt for now continue aggressive IV diuresis and bb  -EKG reviewed   -Echo reviewed   -ACS assessment and treatment as applicable  -Heart failure assessment and treatment as applicable  -Interdisciplinary discussion with IC / EP / HF / CTS teams as needed  TIME SPENT in evaluation and management, reassessments, review and interpretation of labs and x-rays, and hemodynamic management, formulating a plan and coordinating care: ___35____ MIN.  Time does not include procedural time.

## 2018-05-16 NOTE — PROGRESS NOTE ADULT - SUBJECTIVE AND OBJECTIVE BOX
Patient seen and examined at bedside. Patient c/o constipation,and being tired     AM Labs consistent with increased hyponatremia, IV Heparin gtt changed to 0.45%NaCl.     Afib (80s-120s). HR increases to 140 on exertion     ICU Vital Signs Last 24 Hrs  T(C): 36.5 (16 May 2018 14:00), Max: 36.8 (16 May 2018 05:05)  T(F): 97.7 (16 May 2018 14:00), Max: 98.2 (16 May 2018 05:05)  HR: 86 (16 May 2018 13:15) (84 - 120)  BP: 116/58 (16 May 2018 13:15) (98/64 - 140/81)  BP(mean): 80 (16 May 2018 13:15) (80 - 110)  ABP: --  ABP(mean): --  RR: 17 (16 May 2018 13:15) (16 - 18)  SpO2: 95% (16 May 2018 13:15) (95% - 98%)          PHYSICAL EXAM:    General: A/ox 3, No acute Distress  Neck: Supple, NO JVD  Cardiac: S1 S2, No M/R/G  Pulmonary:  Diminished breath sounds throughout with Bibasilar crackles. No wheezing   Abdomen: Soft, Non -tender, +BS x 4 quads  Extremities: +2 pitting edema BL LE extending to knees   Neuro: AAOx3    LABS:                          10.8   7.7   )-----------( 170      ( 16 May 2018 02:52 )             33.5                              05-16    129<L>  |  87<L>  |  48<H>  ----------------------------<  116<H>  3.7   |  34<H>  |  1.48<H>    Ca    8.8      16 May 2018 02:52  Mg     2.0     05-16                              PT/INR - ( 16 May 2018 02:51 )   PT: 12.3 sec;   INR: 1.11          PTT - ( 16 May 2018 02:51 )  PTT:94.2 sec  CAPILLARY BLOOD GLUCOSE      POCT Blood Glucose.: 102 mg/dL (15 May 2018 21:31)  POCT Blood Glucose.: 132 mg/dL (15 May 2018 16:00)            Allergies:  metoprolol (Unknown)  penicillin (Rash)    MEDICATIONS  (STANDING):  albumin human 25% IVPB 50 milliLiter(s) IV Intermittent every 8 hours  aspirin  chewable 81 milliGRAM(s) Oral daily  atorvastatin 40 milliGRAM(s) Oral at bedtime  buMETAnide Injectable 2 milliGRAM(s) IV Push every 8 hours  carvedilol 6.25 milliGRAM(s) Oral every 12 hours  clopidogrel Tablet 75 milliGRAM(s) Oral daily  heparin  Infusion 900 Unit(s)/Hr (9 mL/Hr) IV Continuous <Continuous>  metolazone 5 milliGRAM(s) Oral daily  polyethylene glycol 3350 17 Gram(s) Oral daily    MEDICATIONS  (PRN):  acetaminophen   Tablet. 650 milliGRAM(s) Oral every 6 hours PRN Moderate Pain (4 - 6)  aluminum hydroxide/magnesium hydroxide/simethicone Suspension 30 milliLiter(s) Oral every 6 hours PRN Dyspepsia  bisacodyl Suppository 10 milliGRAM(s) Rectal daily PRN Constipation          < from: Xray Chest 1 View- PORTABLE-Routine (05.16.18 @ 06:56) >  Indication: Shortness of Breath. Nephrotic syndrome. CHF.    Bedside examination of the chest dated 5/16/2018 6:56 AM is compared to   the previous study of 5/15/2018.    Findings: No change mild pulmonary vascular congestion, bilateral pleural   effusions, and bibasilar atelectasis.    Impression: No change.          < end of copied text >

## 2018-05-16 NOTE — PROGRESS NOTE ADULT - PROBLEM SELECTOR PLAN 8
Support provided to patient and family. Patient to have access to supportive services during rest of hospital stay as the patient/family deemed necessary ie. Chaplaincy, Massage therapy, Music therapy, Patient and family supportive services, Palliative SW, etc.  As discussed during the palliative IDT meeting and as identified during the patients PSSA screening the patient would benefit from: music and PET therapy

## 2018-05-16 NOTE — PROGRESS NOTE ADULT - PROBLEM SELECTOR PLAN 5
CHO w/ Bubble Study 5/8 w/ EF 35-40%, mod conc LVH, LV global hypokinesis, LA mildly dilated, RA dilated, trace MR, mild TR, mild pulm HTN,  EF 4/20 50%  tx per cardiology team.

## 2018-05-16 NOTE — PROGRESS NOTE ADULT - SUBJECTIVE AND OBJECTIVE BOX
CARDIOLOGY NP PROGRESS NOTE    Subjective: Patient seen and examined at bedside. Patient c/o constipation. States there is slight improvement in LE edema. Denies CP/SOB, dizziness/diaphoresis, n/v, palpitations. Remainder ROS otherwise negative.    Overnight Events:  AM Labs consistent with increased hyponatremia, IV Heparin gtt changed to 0.45%NaCl.     TELEMETRY: Afib (80s-120s). HR increases to 140 on exertion       VITAL SIGNS:  T(C): 36.5 (05-16-18 @ 10:27), Max: 36.8 (05-16-18 @ 05:05)  HR: 86 (05-16-18 @ 13:15) (84 - 120)  BP: 116/58 (05-16-18 @ 13:15) (98/64 - 140/81)  RR: 17 (05-16-18 @ 13:15) (16 - 18)  SpO2: 95% (05-16-18 @ 13:15) (95% - 98%)  Wt(kg): --    I&O's Summary    15 May 2018 07:01  -  16 May 2018 07:00  --------------------------------------------------------  IN: 766 mL / OUT: 3100 mL / NET: -2334 mL    16 May 2018 07:01  -  16 May 2018 14:03  --------------------------------------------------------  IN: 180 mL / OUT: 800 mL / NET: -620 mL          PHYSICAL EXAM:    General: A/ox 3, No acute Distress  Neck: Supple, NO JVD  Cardiac: S1 S2, No M/R/G  Pulmonary:  Diminished breath sounds throughout with Bibasilar crackles. No wheezing   Abdomen: Soft, Non -tender, +BS x 4 quads  Extremities: +2 pitting edema BL LE extending to knees   Neuro: A/o x 3, No focal deficits            LABS:                          10.8   7.7   )-----------( 170      ( 16 May 2018 02:52 )             33.5                              05-16    129<L>  |  87<L>  |  48<H>  ----------------------------<  116<H>  3.7   |  34<H>  |  1.48<H>    Ca    8.8      16 May 2018 02:52  Mg     2.0     05-16                              PT/INR - ( 16 May 2018 02:51 )   PT: 12.3 sec;   INR: 1.11          PTT - ( 16 May 2018 02:51 )  PTT:94.2 sec  CAPILLARY BLOOD GLUCOSE      POCT Blood Glucose.: 102 mg/dL (15 May 2018 21:31)  POCT Blood Glucose.: 132 mg/dL (15 May 2018 16:00)            Allergies:  metoprolol (Unknown)  penicillin (Rash)    MEDICATIONS  (STANDING):  albumin human 25% IVPB 50 milliLiter(s) IV Intermittent every 8 hours  aspirin  chewable 81 milliGRAM(s) Oral daily  atorvastatin 40 milliGRAM(s) Oral at bedtime  buMETAnide Injectable 2 milliGRAM(s) IV Push every 8 hours  carvedilol 6.25 milliGRAM(s) Oral every 12 hours  clopidogrel Tablet 75 milliGRAM(s) Oral daily  heparin  Infusion 900 Unit(s)/Hr (9 mL/Hr) IV Continuous <Continuous>  metolazone 5 milliGRAM(s) Oral daily  polyethylene glycol 3350 17 Gram(s) Oral daily    MEDICATIONS  (PRN):  acetaminophen   Tablet. 650 milliGRAM(s) Oral every 6 hours PRN Moderate Pain (4 - 6)  aluminum hydroxide/magnesium hydroxide/simethicone Suspension 30 milliLiter(s) Oral every 6 hours PRN Dyspepsia  bisacodyl Suppository 10 milliGRAM(s) Rectal daily PRN Constipation        DIAGNOSTIC TESTS:

## 2018-05-16 NOTE — PROGRESS NOTE ADULT - ASSESSMENT
89yo F with PMHx of Afib on Eliquis, HFpEF 50%, and HTN who presents with progressive dyspnea on exertion, lazarus LE edema 5/7,  admitted to tele for acute on chronic CHF exacerbation likely in setting of NSTEMI, now found to have nephrotic syndrome pending home hospice care

## 2018-05-16 NOTE — PROGRESS NOTE ADULT - PROBLEM SELECTOR PLAN 10
PT reggie, home with home PT    Palliative c/s: Plan pt to go home to [place] with family for home hospice. MOLST form to be filled out prior to discharge

## 2018-05-16 NOTE — PROGRESS NOTE ADULT - PROBLEM SELECTOR PLAN 2
levated troponins on admission with chest tightness  No further chest discomfort  continues with c/o epigastric discomfort  Lipitor and Coreg in addition to AC per card team.

## 2018-05-16 NOTE — PROGRESS NOTE ADULT - PROBLEM SELECTOR PLAN 7
Advance care planning meeting  Start time: 1:20P  End time: 1:50PM  Total time: 30 min    A face to face meeting to discuss advance care planning was held today regarding: MCKINLEY MCCARTY  Primary decision maker: dgbarb Mayers and Marci Mccarty  Alternate/surrogate:  Discussed advance directives including, but not limited to, healthcare proxy and code status.  Decision regarding code status: Full Code  Documentation completed today: NAS reveiwed and given to Marci for further discussion with patient     Spoke with Marci by T/C earlier to discuss code status as NAS was being prepared for D/C She states that her sister Riana wants pt to be recessitated and placed on life support if needed. This is not inline with previous stated goals for medical mangement of symptoms with no invasive procedures. Dgts had stated that they "want my mother to be comfortable at home" Discussed burden vs benefit of CPR/intubation. with both daughters, Marci face to face, Riana by telephone. Riana was very emotional and unable to make a decision at this time. It was agreed that pt would go to live with Marci in [place] with home hospice services to be provided by [facility]. Both dgts are agreeable to this plan. Marci plans to transport her mother on 5/17 in her vehicle. CM arranging for portable O2 for transport. A blank copy of a MOLST was given to Brennon, suggested further conversation amongst the sisters and their mother before making a united decision. This NP offered to facilitate conversation with pt but family declined, wishing to "try on their own" 5E team aware

## 2018-05-16 NOTE — PROGRESS NOTE ADULT - SUBJECTIVE AND OBJECTIVE BOX
CC: SOB    INTERVAL HISTORY:    * CKD stable. * Fluid overload improved. * Net negative on lasix. * BP controlled.    ROS: No chest pain. No shortness of breath. No nausea.    PAST MEDICAL & SURGICAL HISTORY:  CHF (congestive heart failure)  Atrial fibrillation  HTN (hypertension)  H/O right knee surgery  No significant past surgical history  No significant past surgical history    PHYSICAL EXAM:  T(C): 36.8 (16 May 2018 18:27), Max: 36.8 (16 May 2018 05:05)  HR: 92 (16 May 2018 17:37)  BP: 138/72 (16 May 2018 17:37) (98/64 - 140/81)  RR: 17 (16 May 2018 17:37)  SpO2: 94% (16 May 2018 17:37)      15 May 2018 07:01  -  16 May 2018 07:00  --------------------------------------------------------  IN:    heparin Infusion: 28 mL    heparin Infusion: 80 mL    Oral Fluid: 658 mL  Total IN: 766 mL    OUT:    Voided: 3100 mL  Total OUT: 3100 mL    Total NET: -2334 mL      16 May 2018 07:01  -  16 May 2018 20:09  --------------------------------------------------------  IN:    heparin Infusion: 18 mL    Oral Fluid: 450 mL  Total IN: 468 mL    OUT:    Voided: 2200 mL  Total OUT: 2200 mL    Total NET: -1732 mL        Weight     Appearance: alert, pleasant, INAD.  ENT: oral mucosa moist, no pallor/cyanosis.  Neck: no JVD visible.  Cardiac: no rubs. no murmurs. Extremities: anasarca  Skin: no rashes.  Extremities (digits): no clubbing or cyanosis.  Respratory effort: no access muscle use. Lungs: CLEAR TO AUSCULTATION.  Abdomen: soft. nontender. no masses.  Psych affect: not depressed. Orientation: person, place, situation.     MEDICATIONS  (STANDING):  albumin human 25% IVPB 50 milliLiter(s) IV Intermittent every 8 hours  aspirin  chewable 81 milliGRAM(s) Oral daily  atorvastatin 40 milliGRAM(s) Oral at bedtime  buMETAnide Injectable 2 milliGRAM(s) IV Push every 8 hours  carvedilol 6.25 milliGRAM(s) Oral every 12 hours  clopidogrel Tablet 75 milliGRAM(s) Oral daily  heparin  Infusion 1000 Unit(s)/Hr (10 mL/Hr) IV Continuous <Continuous>  metolazone 5 milliGRAM(s) Oral daily  polyethylene glycol 3350 17 Gram(s) Oral daily    MEDICATIONS  (PRN):  acetaminophen   Tablet. 650 milliGRAM(s) Oral every 6 hours PRN Moderate Pain (4 - 6)  aluminum hydroxide/magnesium hydroxide/simethicone Suspension 30 milliLiter(s) Oral every 6 hours PRN Dyspepsia  bisacodyl Suppository 10 milliGRAM(s) Rectal daily PRN Constipation    DATA:  131<L>  |  87<L>  |  49<H>  ----------------------------<  188<H>  Ca:9.3   (16 May 2018 16:07)  4.3     |  32<H>  |  1.34<H>      eGFR if Non : 34 <L>  eGFR if : 39 <L>    TPro  5.8<L>  /  Alb  2.5<L>  /  TBili  0.3    /  DBili  <0.2   /  AST  24     /  ALT  14     /  AlkPhos  73     14 May 2018 06:28    SCr 1.34 [16 May 2018 16:07]  SCr 1.48 [16 May 2018 02:52]  SCr 1.59 [15 May 2018 22:55]  SCr 1.61 [15 May 2018 19:03]  SCr 1.58 [15 May 2018 06:07]                          10.8<L>  7.7   )-----------( 170      ( 16 May 2018 02:52 )             33.5<L>    Phos:-- M.1 mg/dL PTH:-- Uric acid:-- Serum Osm:291 mosm/kg  Ferritin:-- Iron:-- TIBC:-- Tsat:--  B12:-- TSH:-- (16 May 2018 16:07)      UProt:-- UCr:17 mg/dL P/C Ratio:-- 24 hour Prot:-- UVol:-- CrCl:--  Gary:78 mmol/L UOsm:-- UVol:-- UCl:-- UK:-- (16 May 2018 10:00)

## 2018-05-16 NOTE — PROGRESS NOTE ADULT - PROBLEM SELECTOR PROBLEM 10
Discharge planning issues

## 2018-05-16 NOTE — PROGRESS NOTE ADULT - PROBLEM SELECTOR PLAN 5
Patient with hyponatremia on admission to 130, likely due to acute on chronic CHF exacerbation, hypervolemic hyponatremia.  - Na 129 today. Denies HA, dizziness, no change in LOC.   - monitor NA   - Serum Osm and Urine Osm WNL   - F/u Renal Dr Michelle watsons

## 2018-05-16 NOTE — PROGRESS NOTE ADULT - PROBLEM SELECTOR PLAN 4
t with hx of HERNANDO on home Cardizem and Eliquis  now on Atenelol for rate control   Taken off Eliquis and stared on ASA, Heparin, Plavix.

## 2018-05-17 VITALS — TEMPERATURE: 98 F

## 2018-05-17 PROCEDURE — 99232 SBSQ HOSP IP/OBS MODERATE 35: CPT

## 2018-05-17 PROCEDURE — 99238 HOSP IP/OBS DSCHRG MGMT 30/<: CPT

## 2018-05-17 PROCEDURE — 99233 SBSQ HOSP IP/OBS HIGH 50: CPT

## 2018-05-17 RX ORDER — ATORVASTATIN CALCIUM 80 MG/1
1 TABLET, FILM COATED ORAL
Qty: 30 | Refills: 0 | OUTPATIENT
Start: 2018-05-17 | End: 2018-06-15

## 2018-05-17 RX ORDER — BUMETANIDE 0.25 MG/ML
1 INJECTION INTRAMUSCULAR; INTRAVENOUS
Qty: 180 | Refills: 0 | OUTPATIENT
Start: 2018-05-17 | End: 2018-07-15

## 2018-05-17 RX ORDER — BUMETANIDE 0.25 MG/ML
2 INJECTION INTRAMUSCULAR; INTRAVENOUS EVERY 8 HOURS
Qty: 0 | Refills: 0 | Status: DISCONTINUED | OUTPATIENT
Start: 2018-05-17 | End: 2018-05-17

## 2018-05-17 RX ORDER — ATENOLOL 25 MG/1
1 TABLET ORAL
Qty: 0 | Refills: 0 | COMMUNITY

## 2018-05-17 RX ORDER — AMLODIPINE BESYLATE 2.5 MG/1
1 TABLET ORAL
Qty: 0 | Refills: 0 | COMMUNITY

## 2018-05-17 RX ORDER — CARVEDILOL PHOSPHATE 80 MG/1
1 CAPSULE, EXTENDED RELEASE ORAL
Qty: 120 | Refills: 0 | OUTPATIENT
Start: 2018-05-17 | End: 2018-07-15

## 2018-05-17 RX ORDER — ASPIRIN/CALCIUM CARB/MAGNESIUM 324 MG
1 TABLET ORAL
Qty: 0 | Refills: 0 | COMMUNITY
Start: 2018-05-17

## 2018-05-17 RX ADMIN — CLOPIDOGREL BISULFATE 75 MILLIGRAM(S): 75 TABLET, FILM COATED ORAL at 10:43

## 2018-05-17 RX ADMIN — CARVEDILOL PHOSPHATE 6.25 MILLIGRAM(S): 80 CAPSULE, EXTENDED RELEASE ORAL at 06:18

## 2018-05-17 RX ADMIN — Medication 50 MILLILITER(S): at 06:19

## 2018-05-17 RX ADMIN — Medication 81 MILLIGRAM(S): at 10:43

## 2018-05-17 RX ADMIN — POLYETHYLENE GLYCOL 3350 17 GRAM(S): 17 POWDER, FOR SOLUTION ORAL at 10:43

## 2018-05-17 RX ADMIN — BUMETANIDE 2 MILLIGRAM(S): 0.25 INJECTION INTRAMUSCULAR; INTRAVENOUS at 06:18

## 2018-05-17 NOTE — PROGRESS NOTE ADULT - PROBLEM SELECTOR PROBLEM 7
Hypoalbuminemia
Hypoalbuminemia
Palliative care by specialist
Advance care planning
Advance care planning
Hypoalbuminemia

## 2018-05-17 NOTE — PROGRESS NOTE ADULT - PROBLEM SELECTOR PROBLEM 9
Need for other prophylactic measure
Full code status
Full code status
Need for other prophylactic measure

## 2018-05-17 NOTE — PROGRESS NOTE ADULT - PROBLEM SELECTOR PROBLEM 6
Atrial fibrillation
Activity intolerance
Atrial fibrillation
Activity intolerance
Activity intolerance
Atrial fibrillation
HTN (hypertension)
Atrial fibrillation
Atrial fibrillation
HTN (hypertension)

## 2018-05-17 NOTE — PROGRESS NOTE ADULT - SUBJECTIVE AND OBJECTIVE BOX
Patient is a 89yo F with a PMHx of A fib on Eliquis, HFpEF 50%, HTN with multiple recent admissions to [facility] for A fib w/RVR and CHF exacerbation who presents with progressive dyspnea on exertion and shortness of breath. Patient states that since she was hospitalized in April her lower extremity edema has been worse and her exercise tolerance has decreased to the point she will only ambulate to the bathroom with the aide of a cane, otherwise opting to stay in bed. Patient also reports decreasing urinary output over the last month despite increase in her home dose of Lasix to 40mg BID. Patient endorses that she sleeps with her back propped up and is unsure if she can lay flat without orthopnea. She states she has occasional chest pressure particularly when ambulating associated with shortness of breath, relieved by rest. Patient currently denies chest pain or palpitations, but endorses constant epigastric pain and decreased PO intake over the past few weeks. Denies fevers, chills, cough, dysuria, vomiting, diarrhea. Currently breathing comfortably on nasal cannula with lower extremity edema.    In the ED, VS T 97.5, HR 92, /85, R 18, SpO2 94% on room air. Labs without leukocytosis or anemia, normal coags, hyponatremia to 130, Cr of 1.98 (baseline 1.4), albumin of 2.1, CKMB 7.6, Troponin 0.06, BNP 9464. EKG showing rate controlled atrial fibrillation with a LBBB unchanged from prior studies, CXR with pulmonary vascular congestion. Patient given 60mg IV Lasix and admitted to [facility] for management of acute on chronic CHF exacerbation.    Past Medical History:  Atrial fibrillation    CHF (congestive heart failure)    HTN (hypertension).    Past Surgical History:  H/O right knee surgery.    Pt.S&E@BS in AM on [facility]	      MEDICATIONS:  buMETAnide 2 milliGRAM(s) Oral every 8 hours  carvedilol 6.25 milliGRAM(s) Oral every 12 hours  metolazone 5 milliGRAM(s) Oral daily        acetaminophen   Tablet. 650 milliGRAM(s) Oral every 6 hours PRN    aluminum hydroxide/magnesium hydroxide/simethicone Suspension 30 milliLiter(s) Oral every 6 hours PRN  bisacodyl Suppository 10 milliGRAM(s) Rectal daily PRN  polyethylene glycol 3350 17 Gram(s) Oral daily    atorvastatin 40 milliGRAM(s) Oral at bedtime    albumin human 25% IVPB 50 milliLiter(s) IV Intermittent every 8 hours  aspirin  chewable 81 milliGRAM(s) Oral daily  clopidogrel Tablet 75 milliGRAM(s) Oral daily      Complaint:     Otherwise 12 point review of systems is normal.    PHYSICAL EXAM:    Constitutional: NAD  Eyes: PERRL, EOMI, sclera non-icteric  Neck: supple, no masses, no JVD  Respiratory: CTA b/l, good air entry b/l, no wheezing, rhonchi, rales,   Cardiovascular: RRR, normal S1S2, no M/R/G  Gastrointestinal: soft, NTND, no masses palpable, BS +  Extremities:  no c/c/e  Neurological: AAOx3      ICU Vital Signs Last 24 Hrs  T(C): 36.4 (17 May 2018 11:01), Max: 37.1 (17 May 2018 05:14)  T(F): 97.6 (17 May 2018 11:01), Max: 98.7 (17 May 2018 05:14)  HR: 90 (17 May 2018 09:10) (82 - 92)  BP: 114/79 (17 May 2018 09:10) (114/79 - 156/74)  BP(mean): 97 (17 May 2018 09:10) (97 - 102)  ABP: --  ABP(mean): --  RR: 17 (17 May 2018 09:10) (16 - 17)  SpO2: 95% (17 May 2018 09:10) (94% - 98%)          ECG:      CHEST X RAY    CT    MRI    MRA    CT ANGIO    CAROTID DUPLEX    DUPLEX     Echocardiogram    Catheterization:    Stress Test:     LABS:	 	  CARDIAC MARKERS:                              10.8   7.7   )-----------( 170      ( 16 May 2018 02:52 )             33.5     05-16    131<L>  |  87<L>  |  49<H>  ----------------------------<  188<H>  4.3   |  32<H>  |  1.34<H>    Ca    9.3      16 May 2018 16:07  Mg     2.1     05-16            ASSESSMENT/PLAN: 	  89yo F with a PMHx of Afib on Eliquis, HFpEF 50%, HTN with multiple recent admissions to [facility] for A fib w/RVR and CHF exacerbation presented 5/7 with progressive BACK and SOB. Per pt, since hospitalized in April, LE edema has worsened, has had a decreased exercise tolerance and is no longer responding to the increase in HD Lasix.   In the ED, VS T 97.5, HR 92, /85, R 18, SpO2 94% on room air. Labs without leukocytosis or anemia, normal coags, hyponatremia to 130, Cr of 1.98 (baseline 1.4), albumin of 2.1, CKMB 7.6, Troponin 0.06, BNP 9464. EKG showing rate controlled atrial fibrillation with a LBBB unchanged from prior studies, CXR with pulmonary vascular congestion. Patient given 60mg IV Lasix and admitted to [facility] for management of acute on chronic CHF exacerbation.  During hospitalization, c/o intermittent exertional CP with Troponin peaked 0.78.  and patient medically managed for NSTEMI as family wished not to have invasive procedures. Patient medically managed with IV Heparin, ASA and Plavix. ECHO w/ Bubble Study 5/8 w/ EF 35-40% (newly reduced from 50-55%), mod conc LVH, LV global hypokinesis, LA mildly dilated, RA dilated, trace MR, mild TR, mild pulm HTN, PASP 42. Patient initially diuresed with IV Lasix, however, patient without appropriate response for which she was placed on IV Bumex and Metolazone. During this time, patient noted to have ANNETTE on CKD stage 3 as creatinine 1.9, where as baseline between 0.8-1.4. FEUR c/w pre-renal disease and renal ultrasound without hydronephrosis. With diuresis, creatinine downtrending and creatinine now 1.34. Patient was found to have nephrotic syndrome as she had creatinine in protein, LE edema and was hypoalbuminemic. Family did not want diagnostic interventions and she was not a candidate for a kidney biopsy, empiric therapy with immunosuppresive agents or therapy for amyloid/myeloma even if present. Palliative was consulted for goals of care and plan for dispo. Goals of care discussed for medical management of symptoms and no invasive procedures and both daughters in agreement that they want patient to be comfortable at home. Patient medically stable and to be discharged home to live with daughter Brooke in [place] with home hospice services provided by [facility] and  arranging for portable O2 for transport while driving home. Family declined signing MOLST form as they still need further discussion on CPR/intubation. Per Dr. Kemp, discussed anticoagulation risks vs. benefits in detail and family does not want to continue anticoagulation upon discharge and is only agreeable to aspirin. Patient to be discharged home today with home hospice services on Coreg 6.25mg BID, Bumex 2mg TID, Metolazone 5mg QD, Atorvastatin 40mg QHS, ASA 81mg and Diovan 160 mg.

## 2018-05-17 NOTE — PROGRESS NOTE ADULT - SUBJECTIVE AND OBJECTIVE BOX
MCKINLEY MCCARTY   MRN-[id number]        cc: leg heaviness, SOB    HPI:  Patient is a 89yo F with a PMHx of A fib on Eliquis, HFpEF 50%, HTN with multiple recent admissions to [facility] for A fib w/RVR and CHF exacerbation who presents with progressive dyspnea on exertion and shortness of breath. Patient states that since she was hospitalized in April her lower extremity edema has been worse and her exercise tolerance has decreased to the point she will only ambulate to the bathroom with the aide of a cane, otherwise opting to stay in bed. Patient also reports decreasing urinary output over the last month despite increase in her home dose of Lasix to 40mg BID. Patient endorses that she sleeps with her back propped up and is unsure if she can lay flat without orthopnea. She states she has occasional chest pressure particularly when ambulating associated with shortness of breath, relieved by rest. Patient currently denies chest pain or palpitations, but endorses constant epigastric pain and decreased PO intake over the past few weeks. Denies fevers, chills, cough, dysuria, vomiting, diarrhea. Currently breathing comfortably on nasal cannula with lower extremity edema.  Patient found to have nephrotic syndrome (albumin 2.1, protein in urine and peripheral edema).     In the ED, VS T 97.5, HR 92, /85, R 18, SpO2 94% on room air. Labs without leukocytosis or anemia, normal coags, hyponatremia to 130, Cr of 1.98 (baseline 1.4), albumin of 2.1, CKMB 7.6, Troponin 0.06, BNP 9464. EKG showing rate controlled atrial fibrillation with a LBBB unchanged from prior studies, CXR with pulmonary vascular congestion. Patient given 60mg IV Lasix and admitted to [facility] for management of acute on chronic CHF exacerbation. (07 May 2018 19:41)  Palliative Medicine consulted to assist with GOC    Subjective: pt in chair, offers no complaints  ROS:                    Dyspnea (Geo 0-10):    Yes  Geo 4/10                    N/V (Y/N):  N                            Secretions (Y/N) : N                Agitation(Y/N): N  Pain (Y/N):  pt denies         Other Review of Systems: + anasarca, +SOB, + weakness, + fatigue    Allergies    metoprolol (Unknown)  penicillin (Rash)    Intolerances    Opiate Naive (Y/N): Yes  -iStop reviewed (Y/N): Yes | Reference #: [id number]     Medications:      MEDICATIONS  (STANDING):  MEDICATIONS  (STANDING):  albumin human 25% IVPB 50 milliLiter(s) IV Intermittent every 8 hours  aspirin  chewable 81 milliGRAM(s) Oral daily  atorvastatin 40 milliGRAM(s) Oral at bedtime  buMETAnide 2 milliGRAM(s) Oral every 8 hours  carvedilol 6.25 milliGRAM(s) Oral every 12 hours  clopidogrel Tablet 75 milliGRAM(s) Oral daily  metolazone 5 milliGRAM(s) Oral daily  polyethylene glycol 3350 17 Gram(s) Oral daily    MEDICATIONS  (PRN):  acetaminophen   Tablet. 650 milliGRAM(s) Oral every 6 hours PRN Moderate Pain (4 - 6)  aluminum hydroxide/magnesium hydroxide/simethicone Suspension 30 milliLiter(s) Oral every 6 hours PRN Dyspepsia  bisacodyl Suppository 10 milliGRAM(s) Rectal daily PRN Constipation    Labs:                          10.8   7.7   )-----------( 170      ( 16 May 2018 02:52 )             33.5   05-16    129<L>  |  87<L>  |  48<H>  ----------------------------<  116<H>  3.7   |  34<H>  |  1.48<H>    Ca    8.8      16 May 2018 02:52  Mg     2.0     05-16    Imaging: no new Radiology    PEx:  Vital Signs Last 24 Hrs  T(C): 36.4 (17 May 2018 11:01), Max: 37.1 (17 May 2018 05:14)  T(F): 97.6 (17 May 2018 11:01), Max: 98.7 (17 May 2018 05:14)  HR: 90 (17 May 2018 09:10) (82 - 92)  BP: 114/79 (17 May 2018 09:10) (114/79 - 156/74)  BP(mean): 97 (17 May 2018 09:10) (80 - 102)  RR: 17 (17 May 2018 09:10) (16 - 17)  SpO2: 95% (17 May 2018 09:10) (94% - 98%)    General: elderly female appearing younger than stated age, in NAD  HEENT: N/C A/T PERRL, sclera anicteric   Neck: supple NO JVD   CVS: S1S2 RRR  no MRG +3 B/L anasarca t thighs  Resp: bibasilar crackles, poor respiratory effort, on RA  GI:  softly distended, + excessive flatus, last BM 5/16  :  voiding large amounts of urine   Musc: weakness    Neuro: no focal deficits  Psych: calm and cooperative    Skin: WDI  Lymph: No LAD    Advanced Directives:     Full Code     Decision maker: pt along with Riana Mccarty: [phone] and Brooke Mccarty [phone] (dgts)  Legal surrogates: Riana Mccarty: [phone] and Brooke Mccarty [phone] (dgts)      GOALS OF CARE DISCUSSION       Palliative care info/counseling provided	           Family meeting scheduled for 5/14 3:30 PM       Advanced Directivesto be discussed at           Documentation of GOC: conservative medical management of symptoms,  pt to d/c to her daughter Brooke home in [facility] with  services today          REFERRALS	        Palliative Med        Unit SW/Case Mgmt       Massage Therapy       Music Therapy       Hospice       Nutrition        PT/OT

## 2018-05-17 NOTE — PROGRESS NOTE ADULT - SUBJECTIVE AND OBJECTIVE BOX
CC: SOB    INTERVAL HISTORY:    * CKD stable. * Fluid overload improved. * BP controlled.     ROS: No chest pain. No shortness of breath. No nausea.    PAST MEDICAL & SURGICAL HISTORY:  CHF (congestive heart failure)  Atrial fibrillation  HTN (hypertension)  H/O right knee surgery  No significant past surgical history  No significant past surgical history    PHYSICAL EXAM:  T(C): 36.4 (17 May 2018 11:01), Max: 37.1 (17 May 2018 05:14)  HR: 90 (17 May 2018 09:10)  BP: 114/79 (17 May 2018 09:10) (114/79 - 133/72)  RR: 17 (17 May 2018 09:10)  SpO2: 95% (17 May 2018 09:10)      16 May 2018 07:01  -  17 May 2018 07:00  --------------------------------------------------------  IN:    heparin Infusion: 18 mL    heparin Infusion: 80 mL    Oral Fluid: 750 mL  Total IN: 848 mL    OUT:    Voided: 3850 mL  Total OUT: 3850 mL    Total NET: -3002 mL      17 May 2018 07:01  -  17 May 2018 21:23  --------------------------------------------------------  IN:  Total IN: 0 mL    OUT:    Voided: 600 mL  Total OUT: 600 mL    Total NET: -600 mL        Weight     Appearance: alert, pleasant, INAD.  ENT: oral mucosa moist, no pallor/cyanosis.  Neck: no JVD visible.  Cardiac: no rubs. no murmurs. Extremities: anasarca  Skin: no rashes.  Extremities (digits): no clubbing or cyanosis.  Respratory effort: no access muscle use. Lungs: decreased breath sounds bases  Abdomen: soft. nontender. no masses.  Psych affect: not depressed. Orientation: person, place, situation.     MEDICATIONS  (STANDING):  albumin human 25% IVPB 50 milliLiter(s) IV Intermittent every 8 hours  aspirin  chewable 81 milliGRAM(s) Oral daily  atorvastatin 40 milliGRAM(s) Oral at bedtime  buMETAnide 2 milliGRAM(s) Oral every 8 hours  carvedilol 6.25 milliGRAM(s) Oral every 12 hours  clopidogrel Tablet 75 milliGRAM(s) Oral daily  metolazone 5 milliGRAM(s) Oral daily  polyethylene glycol 3350 17 Gram(s) Oral daily    MEDICATIONS  (PRN):  acetaminophen   Tablet. 650 milliGRAM(s) Oral every 6 hours PRN Moderate Pain (4 - 6)  aluminum hydroxide/magnesium hydroxide/simethicone Suspension 30 milliLiter(s) Oral every 6 hours PRN Dyspepsia  bisacodyl Suppository 10 milliGRAM(s) Rectal daily PRN Constipation    DATA:  131<L>  |  87<L>  |  49<H>  ----------------------------<  188<H>  Ca:9.3   (16 May 2018 16:07)  4.3     |  32<H>  |  1.34<H>            SCr 1.34 [16 May 2018 16:07]  SCr 1.48 [16 May 2018 02:52]  SCr 1.59 [15 May 2018 22:55]  SCr 1.61 [15 May 2018 19:03]  SCr 1.58 [15 May 2018 06:07]                          10.8<L>  7.7   )-----------( 170      ( 16 May 2018 02:52 )             33.5<L>    Phos:-- M.1 mg/dL PTH:-- Uric acid:-- Serum Osm:291 mosm/kg  Ferritin:-- Iron:-- TIBC:-- Tsat:--  B12:-- TSH:-- (16 May 2018 16:07)      UProt:-- UCr:17 mg/dL P/C Ratio:-- 24 hour Prot:-- UVol:-- CrCl:--  Gary:78 mmol/L UOsm:-- UVol:-- UCl:-- UK:-- (16 May 2018 10:00)

## 2018-05-17 NOTE — PROGRESS NOTE ADULT - PROBLEM SELECTOR PROBLEM 3
Hypoalbuminemia
NSTEMI (non-ST elevated myocardial infarction)
ANNETTE (acute kidney injury)
Hypoalbuminemia
Hypoalbuminemia
NSTEMI (non-ST elevated myocardial infarction)
ANNETTE (acute kidney injury)
NSTEMI (non-ST elevated myocardial infarction)

## 2018-05-17 NOTE — PROGRESS NOTE ADULT - PROBLEM SELECTOR PROBLEM 4
ANNETTE (acute kidney injury)
Atrial fibrillation, unspecified type
ANNETTE (acute kidney injury)
Atrial fibrillation, unspecified type
Atrial fibrillation, unspecified type
Hyponatremia
ANNETTE (acute kidney injury)
ANNETTE (acute kidney injury)
Hyponatremia
ANNETTE (acute kidney injury)

## 2018-05-17 NOTE — PROGRESS NOTE ADULT - PROBLEM SELECTOR PROBLEM 1
Nephrotic syndrome
Nephrotic syndrome
CHF (congestive heart failure)
Nephrotic syndrome
CHF (congestive heart failure)
Nephrotic syndrome

## 2018-05-17 NOTE — PROGRESS NOTE ADULT - PROBLEM SELECTOR PROBLEM 5
Acute on chronic diastolic congestive heart failure
Hyponatremia
Acute on chronic diastolic congestive heart failure
Acute on chronic diastolic congestive heart failure
Atrial fibrillation
Hyponatremia
Atrial fibrillation
Hyponatremia

## 2018-05-17 NOTE — PROGRESS NOTE ADULT - PROBLEM SELECTOR PLAN 7
pt for D/c home to daughter Brooke'a home today  She will enroll in [facility] once there, RN visit scheduled for later this afternoon  Although pt presently tolerating RA, Pt will travel with portable O2 tank  Family will need continued discussion re: code status.

## 2018-05-17 NOTE — PROGRESS NOTE ADULT - PROBLEM SELECTOR PLAN 1
pt found with nephrotic syndrome as evidenced by Alb 2.1, peripheral edema and protein spilling into urine   Cardiology team with discussion to benefit vs burdens of anticoagulation therapy . Ok with Heparin, Plavix and ASA while inpt but agree only to ASA once D/C   not a candidate for renal biopsy, family deferring any workup  Bumex, Albumin and Zaroxylyn per card team. pt found with nephrotic syndrome as evidenced by Alb 2.1, peripheral edema and protein spilling into urine   Cardiology team with discussion to benefit vs burdens of anticoagulation therapy . Ok with Heparin, Plavix and ASA while inpt but agree only to ASA once D/C   not a candidate for renal biopsy, family deferring any workup  Bumex, Albumin and Zaroxylyn per card team.  Creat trending down

## 2018-05-21 DIAGNOSIS — I08.1 RHEUMATIC DISORDERS OF BOTH MITRAL AND TRICUSPID VALVES: ICD-10-CM

## 2018-05-21 DIAGNOSIS — I50.23 ACUTE ON CHRONIC SYSTOLIC (CONGESTIVE) HEART FAILURE: ICD-10-CM

## 2018-05-21 DIAGNOSIS — I48.91 UNSPECIFIED ATRIAL FIBRILLATION: ICD-10-CM

## 2018-05-21 DIAGNOSIS — Z51.5 ENCOUNTER FOR PALLIATIVE CARE: ICD-10-CM

## 2018-05-21 DIAGNOSIS — E87.1 HYPO-OSMOLALITY AND HYPONATREMIA: ICD-10-CM

## 2018-05-21 DIAGNOSIS — N18.3 CHRONIC KIDNEY DISEASE, STAGE 3 (MODERATE): ICD-10-CM

## 2018-05-21 DIAGNOSIS — I13.0 HYPERTENSIVE HEART AND CHRONIC KIDNEY DISEASE WITH HEART FAILURE AND STAGE 1 THROUGH STAGE 4 CHRONIC KIDNEY DISEASE, OR UNSPECIFIED CHRONIC KIDNEY DISEASE: ICD-10-CM

## 2018-05-21 DIAGNOSIS — I21.4 NON-ST ELEVATION (NSTEMI) MYOCARDIAL INFARCTION: ICD-10-CM

## 2018-05-21 DIAGNOSIS — Z88.0 ALLERGY STATUS TO PENICILLIN: ICD-10-CM

## 2018-05-21 DIAGNOSIS — I44.7 LEFT BUNDLE-BRANCH BLOCK, UNSPECIFIED: ICD-10-CM

## 2018-05-21 DIAGNOSIS — N17.9 ACUTE KIDNEY FAILURE, UNSPECIFIED: ICD-10-CM

## 2018-05-21 DIAGNOSIS — E88.09 OTHER DISORDERS OF PLASMA-PROTEIN METABOLISM, NOT ELSEWHERE CLASSIFIED: ICD-10-CM

## 2018-05-21 DIAGNOSIS — R06.02 SHORTNESS OF BREATH: ICD-10-CM

## 2018-05-23 PROCEDURE — 76770 US EXAM ABDO BACK WALL COMP: CPT

## 2018-05-23 PROCEDURE — 36415 COLL VENOUS BLD VENIPUNCTURE: CPT

## 2018-05-23 PROCEDURE — 84484 ASSAY OF TROPONIN QUANT: CPT

## 2018-05-23 PROCEDURE — 82043 UR ALBUMIN QUANTITATIVE: CPT

## 2018-05-23 PROCEDURE — 97116 GAIT TRAINING THERAPY: CPT

## 2018-05-23 PROCEDURE — 85610 PROTHROMBIN TIME: CPT

## 2018-05-23 PROCEDURE — 94640 AIRWAY INHALATION TREATMENT: CPT

## 2018-05-23 PROCEDURE — P9047: CPT

## 2018-05-23 PROCEDURE — 80048 BASIC METABOLIC PNL TOTAL CA: CPT

## 2018-05-23 PROCEDURE — 80053 COMPREHEN METABOLIC PANEL: CPT

## 2018-05-23 PROCEDURE — 96374 THER/PROPH/DIAG INJ IV PUSH: CPT

## 2018-05-23 PROCEDURE — 82962 GLUCOSE BLOOD TEST: CPT

## 2018-05-23 PROCEDURE — 93005 ELECTROCARDIOGRAM TRACING: CPT

## 2018-05-23 PROCEDURE — 84540 ASSAY OF URINE/UREA-N: CPT

## 2018-05-23 PROCEDURE — 71045 X-RAY EXAM CHEST 1 VIEW: CPT

## 2018-05-23 PROCEDURE — 99285 EMERGENCY DEPT VISIT HI MDM: CPT | Mod: 25

## 2018-05-23 PROCEDURE — 97161 PT EVAL LOW COMPLEX 20 MIN: CPT

## 2018-05-23 PROCEDURE — 80076 HEPATIC FUNCTION PANEL: CPT

## 2018-05-23 PROCEDURE — 83935 ASSAY OF URINE OSMOLALITY: CPT

## 2018-05-23 PROCEDURE — 82570 ASSAY OF URINE CREATININE: CPT

## 2018-05-23 PROCEDURE — 83735 ASSAY OF MAGNESIUM: CPT

## 2018-05-23 PROCEDURE — 82553 CREATINE MB FRACTION: CPT

## 2018-05-23 PROCEDURE — 84300 ASSAY OF URINE SODIUM: CPT

## 2018-05-23 PROCEDURE — 83930 ASSAY OF BLOOD OSMOLALITY: CPT

## 2018-05-23 PROCEDURE — 82550 ASSAY OF CK (CPK): CPT

## 2018-05-23 PROCEDURE — 85730 THROMBOPLASTIN TIME PARTIAL: CPT

## 2018-05-23 PROCEDURE — 85027 COMPLETE CBC AUTOMATED: CPT

## 2018-05-23 PROCEDURE — 84100 ASSAY OF PHOSPHORUS: CPT

## 2018-05-23 PROCEDURE — 81001 URINALYSIS AUTO W/SCOPE: CPT

## 2018-05-23 PROCEDURE — 93306 TTE W/DOPPLER COMPLETE: CPT

## 2018-06-14 ENCOUNTER — APPOINTMENT (OUTPATIENT)
Dept: HEART AND VASCULAR | Facility: CLINIC | Age: 83
End: 2018-06-14

## 2018-07-02 ENCOUNTER — OTHER (OUTPATIENT)
Age: 83
End: 2018-07-02

## 2018-07-16 ENCOUNTER — OTHER (OUTPATIENT)
Age: 83
End: 2018-07-16

## 2018-09-13 NOTE — CONSULT NOTE ADULT - PROVIDER SPECIALTY LIST ADULT
Nephrology
Rehab Medicine
Alert and oriented, no focal deficits, no motor or sensory deficits.
Palliative Care

## 2019-01-17 ENCOUNTER — OTHER (OUTPATIENT)
Age: 84
End: 2019-01-17

## 2019-02-11 ENCOUNTER — OTHER (OUTPATIENT)
Age: 84
End: 2019-02-11

## 2019-02-11 NOTE — PHYSICAL THERAPY INITIAL EVALUATION ADULT - STANDING BALANCE: DYNAMIC, REHAB EVAL
Patient desires injection today of left shoulder(s).  Risks, benefits, potential complications and alternatives were discussed.  With the patient's consent, sterile prep was performed of left shoulder(s).  Left shoulder was injected with Kenalog 40 mg and lidocaine at shoulder subacromial space from the posterolateral approach .  Return to clinic as needed.    Navneet Funez PA-C  Supervising physician: Felice Walden MD  Dept. of Orthopedics  [facility]              supervision/ independent

## 2019-03-20 ENCOUNTER — MEDICATION RENEWAL (OUTPATIENT)
Age: 84
End: 2019-03-20

## 2019-03-20 RX ORDER — METOLAZONE 2.5 MG/1
2.5 TABLET ORAL TWICE DAILY
Qty: 180 | Refills: 3 | Status: ACTIVE | COMMUNITY
Start: 2019-03-20 | End: 1900-01-01

## 2019-03-20 RX ORDER — CARVEDILOL 6.25 MG/1
6.25 TABLET, FILM COATED ORAL
Qty: 180 | Refills: 2 | Status: ACTIVE | COMMUNITY
Start: 2019-03-20 | End: 1900-01-01

## 2019-03-20 RX ORDER — BUMETANIDE 1 MG/1
1 TABLET ORAL TWICE DAILY
Qty: 180 | Refills: 3 | Status: ACTIVE | COMMUNITY
Start: 2019-03-20 | End: 1900-01-01

## 2019-03-21 ENCOUNTER — MEDICATION RENEWAL (OUTPATIENT)
Age: 84
End: 2019-03-21

## 2019-04-02 PROCEDURE — G0179 MD RECERTIFICATION HHA PT: CPT

## 2019-08-20 NOTE — PATIENT PROFILE ADULT. - AS SC BRADEN SENSORY
MD Ced Cardona B Im Nurse Msg Pool 36 minutes ago (12:18 PM)      Unfortunately I can not do both visits at same time , he is also on Xarelto , and has multiple medical problems so he will need to have 2 visits          (4) no impairment

## 2019-11-08 ENCOUNTER — MEDICATION RENEWAL (OUTPATIENT)
Age: 84
End: 2019-11-08

## 2019-11-08 RX ORDER — AMLODIPINE BESYLATE 5 MG/1
5 TABLET ORAL DAILY
Qty: 90 | Refills: 3 | Status: ACTIVE | COMMUNITY
Start: 2018-05-03 | End: 1900-01-01

## 2021-11-30 NOTE — PATIENT PROFILE ADULT. - NS PRO MODE OF ARRIVAL
Pt educated on safe use of narcotics. Pt instructed not to operate heavy machinery or consume alcohol while using narcotic medication. Pt acknowledged understanding. 2:45 AM Recheck on patient. Discussed with patient ED findings and plan for discharge. Patient was given ED warnings, discharge instructions, and follow up information to go home with. Patient understands and agrees with plan for discharge. Any questions have been answered.   stretcher

## 2022-01-11 NOTE — PROGRESS NOTE ADULT - PROBLEM SELECTOR PROBLEM 8
Nutrition, metabolism, and development symptoms
Advance care planning
Nutrition, metabolism, and development symptoms
Palliative care by specialist
Palliative care by specialist
Nutrition, metabolism, and development symptoms
Spine appears normal, range of motion is not limited, no muscle or joint tenderness

## 2022-09-25 NOTE — DISCHARGE NOTE ADULT - NSCORESITESY/N_GEN_A_CORE_RD
Ambulated within unit with RN managing his lines and equipment for a total of 5-7 ft with fwd and bwd steps based on space No

## 2023-01-14 NOTE — PROGRESS NOTE ADULT - ASSESSMENT
[facility]   Transplant Nephrology Progress Note  Date of Admission:  1/11/2023  Today's Date: 01/14/2023    Recommendations:  - Will continue on CRRT.   - Due to frequent CRRT circuit clotting, will change out to a larger dialyzer ([id number]) and slightly increase the prefilter volume to dilute out the potential dialyzer clotting.  - Will pull volume as tolerated with CRRT and allow 0-150 m/hr net.    Assessment & Plan   # DDKT: PAM, felt to be multifactorial with resultant ATN.  Patient remains oliguric, with maybe slightly decreased urine output today.  CRRT started 1/12.  Will plan to continue CRRT.  The CRRT circuit has been clotting frequently and will plan to increase dialyzer size to [id number] and increase prefilter volume.  Patient does have improved BP, but concerned that he won't tolerate much volume removal with iHD at this time.    Patient recently had COVID, CMV sepsis, GI bleed and new diagnosis of liver disease, all while being on CNI and ARB and also diabetic.  With recent hospitalization, patients creatinine was into the mid 3s and stable.  Now presents with slightly higher creatinine and mental status changes with likely some intravascular volume depletion due to poor oral intake, although total body volume up.  Underlying all of this with his liver disease, patient could have HRS.  Previous baseline Cr ~ 1.2-1.4 as recently as 7/2022, but trended up since that time, again, likely coinciding with liver disease.  Not sure if a kidney transplant biopsy is likely to  as acute rejection is unlikely and with overall medical issues, treatment would not be ideal.  - CRRT Info  Access: Temporary Catheter Right IJ; Blood Flow Rate: 200 ml/min; Net Fluid Removal Goal: 0-150 ml/hr as tolerated to keep MAP > 65  Prescription -  Dialysate: 12.5 ml/kg/hr with K4 bath, Pre: 12.5 ml/kg/hr with K4 bath, Post: 200 ml/hr with K4 bath   - Baseline Creatinine: ~  1.2-1.4   - Proteinuria: Normal (<0.2 grams)   - Date DSA Last Checked: Dec/2022      Latest DSA: No   - BK Viremia: Not checked recently due to time from transplant   - Kidney Tx Biopsy: Dec 30, 2014; Result: No diagnostic evidence of acute rejection.  Mild interstitial fibrosis and tubular atrophy.    # Heart Tx: Appears stable with normal cardiac stress test 4/2022.  Cardiac echo 1/2023 with normal LVEF ~ 60-65%.   Management per Cardiology.    # Immunosuppression: Tacrolimus immediate release (goal 4-6) and Mycophenolate mofetil (dose 250 mg every 12 hours)    - On slightly lower immunosuppression (decreased mycophenolate) due to recent CMV viremia.   - Changes: Not at this time; Management per Cardiology.    # Infection Prophylaxis:   Last CD4 Level: 633 (Dec/2022)  - PJP: None  - CMV: Ganciclovir; Being treated for CMV disease.    # Blood Pressure: Controlled, but low at times;  Goal BP: MAP > 65   - Volume status: Moderately hypervolemic, but unclear intravascular volume  EDW ~ TBD   - Changes: Not at this time; Now off antihypertensive medications.  Will plan to remove volume with CRRT, as tolerated.    # Diabetes: Borderline control (HbA1c 7-9%) Last HbA1c: 7.9%   - Management as per primary team.    # Anemia in Chronic Renal Disease: Hgb: Decreased      MEGAN: No   - Iron studies: Low iron saturation    # Leukopenia: Trend down in WBC.  Possibly related to CMV and/or medications versus other infection.  Respiratory viral panel negative.  Blood and urine culture negative.    # Thrombocytopenia: Trend down, low platelet level.  Previous was in the 80-120s over the last week or so, which was improved somewhat over previous lower values.  Likely associated with liver disease and CMV viremia.  Seen by Hematology previously, and noted to have low suspicion for TMA/hemolysis with smear showing no schistocytes and low haptoglobin attributed to possible liver disease.    # Mineral Bone Disorder:   - Secondary renal  CKD stage 3. Fluid overload. Nephrotic syndrome.    Suggest:    1. Continue lasix and zaroxolyn (for palliation).  2. No need to check labs as outpatient.  3. Follow up with me as needed.    Please call with any questions.    Benjie Martinez MD, FACP, FASN | kidney.[facility]  Nephrology, Hypertension, and Internal Medicine  Mobile: [phone] (Daytime Hours Only)  Office/Answering Service: [phone]  Asst. Prof. of Medicine, [facility]  [facility] - Nephrology at [address]  [address], [address], [place], [place] hyperparathyroidism; PTH level: Not checked recently        On treatment: None  - Vitamin D; level: Not checked recently        On supplement: No  - Calcium; level: Low        On supplement: No  - Phosphorus; level: Normal        On binder: No; Improving with dialysis    # Electrolytes:   - Potassium; level: Normal        On supplement: No  - Magnesium; level: Normal        On supplement: No  - Bicarbonate; level: Stable low        On supplement: No    # CMV Disease/Colitis/Duodenitis: Decreased CMV PCR, now detectable, but less than quantifiable on 1/9/23, which is down from a peak of ~ 50K on 12/20/22.  Patient remains on IV ganciclovir with plans to change over to oral Valcyte per Transplant ID.  Normal IgG level.  Patient was CMV IgG Ab negative, as well as the donor was also Ab negative at time of kidney transplant 2014, however, he was CMV IgG Ab discordant with his heart transplant donor (D+/R-) from 2013.     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  Likely of no clinical significance.  Normal IgG level.     # GI Bleed/Esophageal Varices: Patient presented with BRBPR to OSH on 12/11.  He underwent EGD 12/16 that showed a large esophageal varices and a large, cratered duodenal ulcer without stigmata of bleeding.  Pathology on the biopsies was positive for CMV.  He also had portal hypertensive gastropathy, likely all due to newly diagnosed cirrhosis.  Colonoscopy 12/16 was unremarkable.  Patient was subsequently transferred to [facility] with previous hospitalization.  He had an episode of rebleeding from esophageal varices during that last hospitalization and patient had varices banded.  Stable hemoglobin at this time.     # Cirrhosis: This was a recent diagnose during previous hospitalization 12/2022.  This was felt secondary to ARCEO.  Underlying disease associated with esophageal varices and portal hypertensive  gastropathy.  He may also have some component of HRS.  Now presented with very high ammonia levels and AMS.  Followed by Hepatology.     # Altered Mental Status: Likely due to hyperammoniemia due to underlying liver disease.  Also being worked up for infection and other potential causes by primary team.  Decrease in ammonia level with lactulose and also started on rifaximin.  Hepatology following.     # COPD: Appears to be mild and stable.     # H/o Recent COVID Infection: Now cleared without symptoms.     # H/o Norovirus: Enteric BREANNE panel was positive for norovirus on outside lab from 12/14/22.  Immunosuppression was decreased, also partly due to ongoing CMV disease.  Bowel movements had remains loose during previous hospitalization, but not likely due to norovirus infection.  However, patient could have prolonged shedding of norovirus due to chronic immunosuppression.  Transplant ID following.     # Native Kidney Masses: CT abd/pelvis 12/26/22 showed left native kidney 3.6 x 2.6 x 3.4 cm fat-containing mass, likely representing sequela of prior hematoma or possibly angiomyolipoma. Unchanged in size from 10 6/20/2020 study.  Also right native kidney 1.5 x 1.6 x 0.9 cm intermediate density rounded mass with the small foci of fat, not present on CT of 10/6/2020. Its rapid growth is concerning for potentially are RCC. The fat could be a renal sinus fat enveloped by a tumor or this is a rapidly growing angiomyolipoma.              - Recommend Urology referral as outpatient and repeat CT in 3-6 months.     # Ventral Hernia: Previously with pain, but not now.  Reducible on exam.  CT abd/pelvis showing no incarceration.  GI following.    # Transplant History:  Etiology of Kidney Failure: Unknown etiology  Tx: DDKT and Heart Tx  Transplant: 6/26/2014 (Kidney), 4/28/2013 (Heart)  Significant changes in immunosuppression: None  Significant transplant-related complications: CMV Viremia and EBV Viremia    Recommendations were  communicated to the primary team via this note.    Jw Monterroso MD   Pager: [phone]    Interval History   Mr. Castellano's creatinine is 2.39, 2.39 (448); Trend down with CRRT.  Decreased urine output, now oliguric.  Other significant labs/tests/vitals: Stable electrolytes.  Stable hemoglobin.  Trend down WBC and platelet level.  No new events overnight.  CRRT circuit has been clotting off frequently.  Patient remains intubated and sedated.    Review of Systems   Unable because patient is intubated and sedated    MEDICATIONS:    carvedilol  6.25 mg Oral BID w/meals     cefTRIAXone  2 g Intravenous Q24H     ganciclovir (CYTOVENE) intermittent infusion  2.5 mg/kg (Adjusted) Intravenous Q12H     heparin ANTICOAGULANT  5,000 Units Subcutaneous Q8H     insulin aspart  1-6 Units Subcutaneous Q4H     lactulose  20 g Oral or NG Tube TID     levothyroxine  150 mcg Oral Daily     multivitamin RENAL  1 capsule Oral or Feeding Tube Daily     mycophenolate  250 mg Oral or Feeding Tube BID     pantoprazole  40 mg Per NG tube BID AC     [Held by provider] pravastatin  20 mg Oral Daily     protein modular  1 packet Per Feeding Tube TID     rifaximin  550 mg Oral or NG Tube BID     [Held by provider] sertraline  50 mg Oral Daily     sodium chloride (PF)  3 mL Intracatheter Q8H     tacrolimus  0.5 mg Oral or Feeding Tube BID IS     thiamine  100 mg Oral Daily       - MEDICATION INSTRUCTIONS -       dexmedetomidine 0.5 mcg/kg/hr (23 1000)     dextrose       CRRT replacement solution 12.5 mL/kg/hr (23 0741)     - MEDICATION INSTRUCTIONS -       norepinephrine Stopped (236)     CRRT replacement solution 200 mL/hr at 23 1202     CRRT replacement solution 12.5 mL/kg/hr (23 0741)     sodium chloride 0.9% (bag)         Physical Exam   Temp  Av.9  F (36.6  C)  Min: 96.8  F (36  C)  Max: 100.5  F (38.1  C)      Pulse  Av.5  Min: 70  Max: 140 Resp  Av  Min: 10  Max: 32  FiO2 (%)   "Av.3 %  Min: 30 %  Max: 100 %  SpO2  Av.8 %  Min: 87 %  Max: 100 %     /66   Pulse 80   Temp 98.2  F (36.8  C) (Axillary)   Resp 18   Ht 1.854 m (6' 1\")   Wt 112.2 kg (247 lb 5.7 oz)   SpO2 100%   BMI 32.63 kg/m     Date 23 07 - 23 0659   Shift 5359-2319 3414-3745 9212-6508 24 Hour Total   INTAKE   I.V. 11.9   11.9   NG/GT 75   75   Shift Total(mL/kg) 86.9(0.77)   86.9(0.77)   OUTPUT   Urine 20   20   Shift Total(mL/kg) 20(0.18)   20(0.18)   Weight (kg) 113 113 113 113      Admit Weight: 113 kg (249 lb 1.9 oz)     GENERAL APPEARANCE: intubated and sedated  HENT: intubated  RESP: lungs clear to auscultation - no rales, rhonchi or wheezes  CV: regular rhythm, normal rate, no rub, no murmur  EDEMA: 1+ LE edema bilaterally, R>L  ABDOMEN: soft, nondistended with large ventral hernia, bowel sounds normal  MS: extremities normal - no gross deformities noted, no evidence of inflammation in joints, no muscle tenderness  SKIN: no rash  TX KIDNEY: normal  DIALYSIS ACCESS:  Temporary catheter right internal jugular; RUE AV graft with NO thrill    Data   All labs reviewed by me.  CMP  Recent Labs   Lab 23  0745 23  0448 23  0430 23  0002 23  2040 23  2021 23  1159 23  1157 23  0456 23  0444 23  1209 23  1030   NA  --  138  138  --   --   --  140  --  140  --  141  141   < > 144 138   POTASSIUM  --  3.7  3.7  --   --   --  3.9  3.9  --  3.4  --  3.5  3.5   < > 3.7 3.7   CHLORIDE  --  105  105  --   --   --  107  --  107  --  106  106   < > 103 99   CO2  --  20*  20*  --   --   --  21*  --  21*  --  22  22   < > 23 27   ANIONGAP  --  13  13  --   --   --  12  --  12  --  13  13   < > 18* 12   * 208*  208* 201* 172*   < > 172*   < > 171*   < > 151*  151*   < > 158* 240*   BUN  --  39.8*  39.8*  --   --   --  45.5*  --  59.9*  --  69.4*  69.4*   < > 89.4* 84.8*   CR  --  2.39*  2.39*  --  "  --   --  2.57*  --  3.28*  --  3.79*  3.79*   < > 3.82* 4.37*   GFRESTIMATED  --  29*  29*  --   --   --  26*  --  20*  --  16*  16*   < > 16* 14*   MEGHANN  --  8.0*  8.0*  --   --   --  7.8*  --  7.7*  --  8.1*  8.1*   < > 8.6* 8.3*   MAG  --  2.2  --   --   --  2.2  --  2.4*  --  2.4*   < >  --   --    PHOS  --  3.9  --   --   --  4.1  --  4.5  --  5.0*   < >  --   --    PROTTOTAL  --  5.6*  --   --   --   --   --   --   --  5.4*  --  5.9* 5.8*   ALBUMIN  --  2.7*  2.7*  --   --   --  2.6*  --  2.5*  --  2.6*  2.6*   < > 2.8* 2.6*   BILITOTAL  --  0.6  --   --   --   --   --   --   --  0.6  --  1.0 0.8   ALKPHOS  --  70  --   --   --   --   --   --   --  62  --  73 73   AST  --  52*  --   --   --   --   --   --   --  57*  --  43 35   ALT  --  15  --   --   --   --   --   --   --  12  --  20 17    < > = values in this interval not displayed.     CBC  Recent Labs   Lab 01/14/23 0448 01/13/23 2021 01/13/23 1157 01/13/23 0444   HGB 9.1*  9.1* 8.8* 8.1* 8.6*   WBC 2.8*  2.8* 3.0* 3.2* 3.5*   RBC 3.03*  3.03* 2.87* 2.67* 2.86*   HCT 30.7*  30.7* 29.3* 26.9* 28.5*   *  101* 102* 101* 100   MCH 30.0  30.0 30.7 30.3 30.1   MCHC 29.6*  29.6* 30.0* 30.1* 30.2*   RDW 16.0*  16.0* 16.1* 16.3* 16.1*   PLT 60*  60* 75* 62* 71*     INR  Recent Labs   Lab 01/12/23  0806 01/12/23  0535   INR 1.51* 1.50*     ABG  Recent Labs   Lab 01/13/23  0614 01/12/23  0806 01/12/23  0225 01/12/23  0004   PH 7.44 7.43  7.43 7.40 7.31*   PCO2 37 40  40 41 49*   PO2 141* 49*  49* 153* 441*   HCO3 25 27  27 26 25   O2PER 30 60  60 80 100      Urine Studies  Recent Labs   Lab Test 01/11/23  2306 12/30/22  0904 12/20/22  0843 01/08/19  0915   COLOR Light Yellow Light Yellow Yellow Yellow   APPEARANCE Clear Clear Clear Clear   URINEGLC Negative Negative Negative Negative   URINEBILI Negative Negative Negative Negative   URINEKETONE Negative Negative Negative Negative   SG 1.014 1.009 1.015 1.023   UBLD Negative Small*  Negative Negative   URINEPH 5.0 5.0 5.5 5.5   PROTEIN Negative Negative 10* 10*   NITRITE Negative Negative Negative Negative   LEUKEST Trace* Trace* Negative Negative   RBCU 1 70* 1 <1   WBCU 8* 9* 3 3     Recent Labs   Lab Test 07/28/22  0907 12/30/21  0908 10/28/20  0936 11/04/19  1246 06/01/17  1518 12/30/14  0908   UTPG 0.11 0.20 0.24* 0.14 0.12 0.18     PTH  Recent Labs   Lab Test 06/12/17  0859   PTHI 32     Iron Studies  Recent Labs   Lab Test 12/22/22  0620 04/18/22  0700 06/22/21  0742   IRON 36* 53 28*   * 327 419   IRONSAT 19 16 7*   ALICJA 152 51 10*       IMAGING:  All imaging studies reviewed by me.

## 2023-04-29 NOTE — H&P ADULT - PROBLEM SELECTOR PLAN 2
Patient with history of Afib.  -AC: Reduced dose of Eliquis to 2.5mg BID based on Cr clearance  -Rate control: Holding BB in setting of CHF exacerbation no Suspicion for decreased PO intake. No history of bladder obstruction and patient has been urinating with IV lasix currently.   -Will send urine lytes for FeUrea (FeNa cannot be used 2/2 lasix usage).   -trend BMP

## 2023-05-01 NOTE — ED PROVIDER NOTE - PMH
Pt with recent hosp  3/23 for complicated UTI/sepsis and R ureteral stone with Hx of cystectomy and ilioconduit was given R nephrostomy tube and Cipro on D/C till 4/14 comes for fever and  T to 102 and dec urine in collecting bag.  R nephostomy tube in upper calyx and needs readjustment or to be removed.  The pt is cultured and placed on Ceftriaxone IV.    Fever, No sepsis on ad  Complicated UTI  R nephrostomy tube maladjustment  Hx of HTN, ASHD, Afib, on Eliquis  Cough, rhinorrhea  Hx of DLD  Hx of DM II, CKD  Hx of GERD, HH, diverticulosis  Hx of bladder ca, sp cystectomy and prostatectomy, ilioconduit RLQ  Hx of recent UTI due to R ureteral stone, sp R nephrostomy tube placement 3/23  Hx of OA, DJD, sp BL hip and BL knee surgery, DDD of C spine and L spine, mobility dysfunction  Hx of venous insuff, varicose veins of lower ext, sp ;power ext DVT  Hx of Covid PNA and RF 2022  Hx of depression    pt afebrile, WBC 7.9  pt cultured and placed on Ceftriaxone, awaiting UCX. BC&S neg  CXR reviewed and shows no infiltrate  pt with cough and rhinorrhea, tested neg for Covid, FLU A&B, RSV  CT of abd/pelvis reviewed:  BL renal calculi, no hydro, R nephrostomy tube in upper calyx, prior R ureteral calculi no longer  seen, + ilioconduit exiting in RLQ  Urology consult:  no Uro intervention req  IR consult for possible removal of R nephrostomy tube  no hydro,  prior R ureteral stone no longer seen, do we need to readjust R nephrostomy tube or can we remove it as pt has ilioconduit  maintain home meds  Rehab  Social SVC consult       Pt with recent hosp  3/23 for complicated UTI/sepsis and R ureteral stone with Hx of cystectomy and ilioconduit was given R nephrostomy tube and Cipro on D/C till 4/14 comes for fever and  T to 102 and dec urine in collecting bag.  R nephostomy tube in upper calyx and needs readjustment or to be removed.  The pt is cultured and placed on Ceftriaxone IV.    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 BC&S neg  CXR reviewed and shows no infiltrate  pt with cough and rhinorrhea, tested neg for Covid, FLU A&B, RSV  CT of abd/pelvis reviewed:  BL renal calculi, no hydro, R nephrostomy tube in upper calyx, prior R ureteral calculi no longer  seen, + ilioconduit exiting in RLQ  Urology consult:  no Uro intervention req  IR consult for possible removal of R nephrostomy tube  no hydro,  prior R ureteral stone no longer seen, do we need to readjust R nephrostomy tube or can we remove it as pt has ilioconduit  maintain home meds  Rehab  Social SVC consult       Atrial fibrillation    CHF (congestive heart failure)    HTN (hypertension)

## 2023-05-16 NOTE — ED ADULT NURSE NOTE - CHIEF COMPLAINT
Consent: The patient's consent was obtained including but not limited to risks of crusting, scabbing, blistering, scarring, darker or lighter pigmentary change, recurrence, incomplete removal and infection.
Detail Level: Detailed
Render Post-Care Instructions In Note?: yes
Render Note In Bullet Format When Appropriate: No
Duration Of Freeze Thaw-Cycle (Seconds): 0
Post-Care Instructions: I reviewed with the patient in detail post-care instructions. Patient is to wear sunprotection, and avoid picking at any of the treated lesions. Pt may apply Vaseline to crusted or scabbing areas.
The patient is a 89y Female complaining of

## 2023-06-13 NOTE — PROGRESS NOTE ADULT - PROBLEM SELECTOR PLAN 5
DASH diet   K>4 Mg>2   Dispo: 5[facility]ach; PT eval- Home N: DASH/TLC diet, 1L fluid restriction  E: Replete lyytes PRN K<4 Mg<2   Dispo: 5[facility]; PT eval- rec Home w/o home PT Cyclosporine Pregnancy And Lactation Text: This medication is Pregnancy Category C and it isn't know if it is safe during pregnancy. This medication is excreted in breast milk.

## 2024-05-20 NOTE — ED ADULT TRIAGE NOTE - BP NONINVASIVE DIASTOLIC (MM HG)
May 21, 2024       Re: Notice of Medical Device & Supplies      To Whom It May Concern,    My patient Rupinder Pinon has a medical conditions and requires carrying of daily medications at all times. This patient should be allowed to carry their cabergoline with them at all times. Please allow him/her to travel with these medications and supplies.        Sincerely,         Jhony Akers MD                       [phone]

## 2025-01-23 NOTE — PHYSICAL THERAPY INITIAL EVALUATION ADULT - PLANNED THERAPY INTERVENTIONS, PT EVAL
[FreeTextEntry1] : PMD- Dr Mark Brumfield- Dr Banegas Rheum- Dr Bk Hines Derm- Dr Jett Chand ID- Dr Andrzej Dr @ [facility]- Dr Yon Gerard Breast- Sherri Olvera @ [facility]- Dr Anthony Mcguire GS- Dr Beckman 
transfer training/balance training/gait training/bed mobility training/endurance training

## 2025-07-18 NOTE — H&P ADULT - ASSESSMENT
Patient is a 89 year old woman with history of afib on eliquis, CHFpEF, HTN, presenting with bilateral leg and feet swelling with sob when walking for 3 months got worse for 1week , also with facial and hand swelling for 3 days. The patient is a 53y Male complaining of chest pain. 89 yo woman pmh as above presents with wrosening appetite, fatigue and LE swelling with failure of lasix PO to facilitate urination found to be in CHF exacerbation associated with failure of lasix due to acute kidney injury 2/2 decreased PO intake with possibility of dietary non-compliance.